# Patient Record
Sex: FEMALE | Race: BLACK OR AFRICAN AMERICAN | Employment: FULL TIME | URBAN - METROPOLITAN AREA
[De-identification: names, ages, dates, MRNs, and addresses within clinical notes are randomized per-mention and may not be internally consistent; named-entity substitution may affect disease eponyms.]

---

## 2017-01-22 ENCOUNTER — HOSPITAL ENCOUNTER (EMERGENCY)
Age: 27
Discharge: HOME | End: 2017-01-22
Attending: EMERGENCY MEDICINE
Payer: MEDICAID

## 2017-01-22 VITALS
WEIGHT: 203 LBS | DIASTOLIC BLOOD PRESSURE: 78 MMHG | RESPIRATION RATE: 16 BRPM | HEIGHT: 69 IN | HEART RATE: 68 BPM | BODY MASS INDEX: 30.07 KG/M2 | SYSTOLIC BLOOD PRESSURE: 134 MMHG | TEMPERATURE: 98.1 F | OXYGEN SATURATION: 100 %

## 2017-01-22 DIAGNOSIS — Z32.01 PREGNANCY TEST PERFORMED, PREGNANCY CONFIRMED: Primary | ICD-10-CM

## 2017-01-22 LAB
APPEARANCE UR: CLEAR
BILIRUB UR QL: NEGATIVE
COLOR UR: YELLOW
GLUCOSE UR STRIP.AUTO-MCNC: NEGATIVE MG/DL
HCG UR QL: POSITIVE
HGB UR QL STRIP: NEGATIVE
KETONES UR QL STRIP.AUTO: NEGATIVE MG/DL
LEUKOCYTE ESTERASE UR QL STRIP.AUTO: NEGATIVE
NITRITE UR QL STRIP.AUTO: NEGATIVE
PH UR STRIP: 5.5 [PH] (ref 5–8)
PROT UR STRIP-MCNC: NEGATIVE MG/DL
SP GR UR REFRACTOMETRY: 1.03 (ref 1–1.03)
UROBILINOGEN UR QL STRIP.AUTO: 1 EU/DL (ref 0.2–1)

## 2017-01-22 PROCEDURE — 81003 URINALYSIS AUTO W/O SCOPE: CPT | Performed by: EMERGENCY MEDICINE

## 2017-01-22 PROCEDURE — 99283 EMERGENCY DEPT VISIT LOW MDM: CPT

## 2017-01-22 PROCEDURE — 81025 URINE PREGNANCY TEST: CPT | Performed by: EMERGENCY MEDICINE

## 2017-01-22 NOTE — ED TRIAGE NOTES
C/o of upper abd pain since last Saturday on and off, + vomiting last Friday. Denies any vag discharge, or UTI symptoms.  + bilaterally flank area pain

## 2017-01-22 NOTE — ED NOTES
I have reviewed discharge instructions with the patient. The patient verbalized understanding. Current Discharge Medication List      START taking these medications    Details   prenatal multivit-ca-min-fe-fa (PRENATAL VITAMIN) tab Take 1 Tab by mouth daily. Qty: 30 Tab, Refills: 0         CONTINUE these medications which have NOT CHANGED    Details   buPROPion SR (WELLBUTRIN SR) 150 mg SR tablet Take 1 Tab by mouth two (2) times a day. Indications: ANXIETY WITH DEPRESSION  Qty: 60 Tab, Refills: 3      calcium citrate-vitamin D3 (CITRACAL WITH VITAMIN D MAXIMUM) tablet Take 2 tablets by mouth three (3) times daily. Associated Diagnoses: Malabsorption      cyanocobalamin (VITAMIN B12) 500 mcg tablet Take 500 mcg by mouth daily. Associated Diagnoses: Malabsorption      albuterol (PROAIR HFA) 90 mcg/actuation inhaler Take 2 Puffs by inhalation as needed. multivitamin with iron (FLINTSTONES) chewable tablet Take 1 tablet by mouth two (2) times a day. Associated Diagnoses: Malabsorption      Cholecalciferol, Vitamin D3, (VITAMIN D3) 2,000 unit cap capsule Take  by mouth two (2) times a day.     Associated Diagnoses: Malabsorption         Patient armband removed and shredded

## 2017-01-22 NOTE — DISCHARGE INSTRUCTIONS
MEDNAX Activation    Thank you for requesting access to MEDNAX. Please follow the instructions below to securely access and download your online medical record. MEDNAX allows you to send messages to your doctor, view your test results, renew your prescriptions, schedule appointments, and more. How Do I Sign Up? 1. In your internet browser, go to [email or web address]  2. Click on the First Time User? Click Here link in the Sign In box. You will be redirect to the New Member Sign Up page. 3. Enter your MEDNAX Access Code exactly as it appears below. You will not need to use this code after youve completed the sign-up process. If you do not sign up before the expiration date, you must request a new code. MEDNAX Access Code: [email or web address]  Expires: 2017 12:41 PM (This is the date your MEDNAX access code will )    4. Enter the last four digits of your Social Security Number (xxxx) and Date of Birth (mm/dd/yyyy) as indicated and click Submit. You will be taken to the next sign-up page. 5. Create a MEDNAX ID. This will be your MEDNAX login ID and cannot be changed, so think of one that is secure and easy to remember. 6. Create a MEDNAX password. You can change your password at any time. 7. Enter your Password Reset Question and Answer. This can be used at a later time if you forget your password. 8. Enter your e-mail address. You will receive e-mail notification when new information is available in [address] 9. Click Sign Up. You can now view and download portions of your medical record. 10. Click the Download Summary menu link to download a portable copy of your medical information. Additional Information    If you have questions, please visit the Frequently Asked Questions section of the MEDNAX website at [email or web address] Remember, MEDNAX is NOT to be used for urgent needs. For medical emergencies, dial 911.

## 2017-01-22 NOTE — ED PROVIDER NOTES
HPI Comments: 10:28 AM [address] Vázquez is a 27 y.o. female with a history of Asthma and Sleep Apnea who presents to ED c/o upper abdominal pain onset a week ago. Pt explains that she has been having abdominal pain unrelated to food for the past week mostly aggravated when lifting heavy things at work which prompted her to come to the ED for further evaluation. Pt also c/o pelvic pain, intermittent lower back pain and SOB. The pt reports her last alcoholic drink being 2-3 days ago, having 2 periods last month, and an episode of vomiting last week. Pt denies having vomiting today, recreational drug use, smoking, and diarrhea. No other concerns at this time. PCP: Shira Caal MD        Patient is a 27 y.o. female presenting with abdominal pain and back pain. The history is provided by the patient. Abdominal Pain    Associated symptoms include back pain (intermittent, lower back). Pertinent negatives include no diarrhea and no vomiting. Back Pain    Associated symptoms include abdominal pain (upper). Pertinent negatives include no pelvic pain. Past Medical History:   Diagnosis Date    Arrhythmia      palpitations    Asthma     Morbid obesity with BMI of 50.0-59.9, adult (HCC)     Sleep apnea     Unspecified sleep apnea        Past Surgical History:   Procedure Laterality Date    Delivery        x 3    Pr abdomen surgery proc unlisted  2014     gastric bypass with repair hiatal hernia    Hx gi       gastric bypass    Hx gyn       c section x 3    Hx orthopaedic       R ring finger fx repair         History reviewed. No pertinent family history. Social History     Social History    Marital status: SINGLE     Spouse name: N/A    Number of children: N/A    Years of education: N/A     Occupational History    Not on file.      Social History Main Topics    Smoking status: Former Smoker     Quit date: 2014    Smokeless tobacco: Never Used    Alcohol use 1.0 oz/week     2 Standard drinks or equivalent per week      Comment: 3x week    Drug use: No    Sexual activity: Yes     Birth control/ protection: None     Other Topics Concern    Not on file     Social History Narrative         ALLERGIES: Review of patient's allergies indicates no known allergies. Review of Systems   Respiratory: Positive for shortness of breath. Gastrointestinal: Positive for abdominal pain (upper). Negative for diarrhea and vomiting. Genitourinary: Negative for pelvic pain. Musculoskeletal: Positive for back pain (intermittent, lower back). All other systems reviewed and are negative. Vitals:    01/22/17 1022   BP: 134/78   Pulse: 68   Resp: 16   Temp: 98.1 °F (36.7 °C)   SpO2: 100%   Weight: 92.1 kg (203 lb)   Height: 5' 9\" (1.753 m)        100% on RA, indicating adequate oxygenation. Physical Exam   Constitutional: She appears well-developed and well-nourished. Non-toxic appearance. She does not have a sickly appearance. She does not appear ill. No distress. HENT:   Head: Normocephalic and atraumatic. Mouth/Throat: Oropharynx is clear and moist. No oropharyngeal exudate. Eyes: Conjunctivae and EOM are normal. Pupils are equal, round, and reactive to light. No scleral icterus. Neck: Normal range of motion. Neck supple. No hepatojugular reflux and no JVD present. No tracheal deviation present. No thyromegaly present. Cardiovascular: Normal rate, regular rhythm, S1 normal, S2 normal, normal heart sounds, intact distal pulses and normal pulses. Exam reveals no gallop, no S3 and no S4. No murmur heard. Pulses:       Radial pulses are 2+ on the right side, and 2+ on the left side. Dorsalis pedis pulses are 2+ on the right side, and 2+ on the left side. Pulmonary/Chest: Effort normal and breath sounds normal. No respiratory distress. She has no decreased breath sounds. She has no wheezes. She has no rhonchi. She has no rales. Abdominal: Soft.  Normal appearance and bowel sounds are normal. She exhibits no shifting dullness, no distension, no pulsatile liver, no fluid wave, no abdominal bruit, no ascites, no pulsatile midline mass and no mass. There is no hepatosplenomegaly. There is generalized tenderness. There is no rigidity, no rebound, no guarding, no CVA tenderness, no tenderness at McBurney's point and negative Andrews's sign. Musculoskeletal: Normal range of motion. Strength 5/5 throughout    Lymphadenopathy:        Head (right side): No submental, no submandibular, no preauricular and no occipital adenopathy present. Head (left side): No submental, no submandibular, no preauricular and no occipital adenopathy present. She has no cervical adenopathy. Right: No supraclavicular adenopathy present. Left: No supraclavicular adenopathy present. Neurological: She is alert. She has normal strength and normal reflexes. She is not disoriented. No cranial nerve deficit or sensory deficit. Coordination and gait normal. GCS eye subscore is 4. GCS verbal subscore is 5. GCS motor subscore is 6. Grossly intact    Skin: Skin is warm, dry and intact. No rash noted. She is not diaphoretic. Psychiatric: She has a normal mood and affect. Her speech is normal and behavior is normal. Judgment and thought content normal. Cognition and memory are normal.   Nursing note and vitals reviewed. MDM  Number of Diagnoses or Management Options  Pregnancy test performed, pregnancy confirmed:   Diagnosis management comments: DDX: Gastritis, gerd, peptic ulcer disease, cholecystitis, pancreatitis, gastroenteritis, hepatitis, constipation related pain, appendicitis pain, diverticulitis, urinary tract infection, obstruction, abdominal wall pain, atypical cardiac (ami or anginal pain), referred pain from pulmonary process (pneumonia, empyema), ectopic pregnancy,  or combination of the above versus many other processes.       ED Course Procedures    Labs essentially normal, HCG-positve. 11:37 AM 1/22/2017    I have reassessed the patient. Patient is feeling okay. Patient will be prescribed Prenatal Vitamins. Patient was discharged in stable condition. Patient is to return to emergency department if any new or worsening condition. Scribe Attestation:   [employer] as a scribe for and in the presence of Ana Euceda DO January 22, 2017 at 10:35 AM       Provider Attestation:   I personally performed the services described in the documentation, reviewed the documentation, as recorded by the scribe in my presence, and it accurately and completely records my words and actions.      Reviewed and signed by:  Ana Euceda DO

## 2017-01-24 ENCOUNTER — OFFICE VISIT (OUTPATIENT)
Dept: SURGERY | Age: 27
End: 2017-01-24

## 2017-01-24 VITALS
HEIGHT: 69 IN | DIASTOLIC BLOOD PRESSURE: 80 MMHG | TEMPERATURE: 98.2 F | BODY MASS INDEX: 30.96 KG/M2 | SYSTOLIC BLOOD PRESSURE: 118 MMHG | WEIGHT: 209 LBS | RESPIRATION RATE: 16 BRPM | HEART RATE: 60 BPM

## 2017-01-24 DIAGNOSIS — E66.9 OBESITY (BMI 30-39.9): ICD-10-CM

## 2017-01-24 DIAGNOSIS — E55.9 HYPOVITAMINOSIS D: ICD-10-CM

## 2017-01-24 DIAGNOSIS — Z3A.01 LESS THAN 8 WEEKS GESTATION OF PREGNANCY: ICD-10-CM

## 2017-01-24 DIAGNOSIS — D50.9 IRON DEFICIENCY ANEMIA, UNSPECIFIED IRON DEFICIENCY ANEMIA TYPE: ICD-10-CM

## 2017-01-24 DIAGNOSIS — K91.2 POSTOPERATIVE MALABSORPTION: Primary | ICD-10-CM

## 2017-01-24 DIAGNOSIS — E53.8 B12 DEFICIENCY: ICD-10-CM

## 2017-01-24 DIAGNOSIS — Z98.84 GASTRIC BYPASS STATUS FOR OBESITY: ICD-10-CM

## 2017-01-24 NOTE — PROGRESS NOTES
Patient is a 27year old female presenting for a 2.5 years s/p gastric bypass. Patient reports she discontinued the Wellbutrin due to finding out she is pregnant. Patient reports she went to ED and confirmed pregnancy. Patient continues vitamins and started prenatal vitamins. Patient has gained 5lbs since last visit 12/2016. Body mass index is 30.86 kg/(m^2).

## 2017-01-24 NOTE — PATIENT INSTRUCTIONS
If you have any question or concerns about today's appointment, the verbal and/or written instructions you were given for follow up care, please call our office at [phone].      [facility], [address]  [place], [address]

## 2017-01-24 NOTE — MR AVS SNAPSHOT
Visit Information Date & Time Provider Department Dept. Phone Encounter #  
 1/24/2017 10:00 AM Judy Leija PA-C [address] [id number] Your Appointments 3/24/2017 10:30 AM  
Follow Up with KAYA Hall [address] ([facility]) Appt Note: GBP f/up [address] [place] [id number] [address] [facility] Upcoming Health Maintenance Date Due Pneumococcal 19-64 Medium Risk (1 of 1 - PPSV23) 2/22/2007 DTaP/Tdap/Td series (1 - Tdap) 2/22/2009 PAP AKA CERVICAL CYTOLOGY 2/22/2009 INFLUENZA AGE 9 TO ADULT 8/1/2016 Allergies as of 1/24/2017  Review Complete On: 1/22/2017 By: Dustin Desir RN No Known Allergies Current Immunizations  Never Reviewed No immunizations on file. Not reviewed this visit Vitals BP Pulse Temp Resp Height(growth percentile) Weight(growth percentile) 118/80 60 98.2 °F (36.8 °C) (Oral) 16 5' 9\" (1.753 m) 209 lb (94.8 kg) LMP BMI OB Status Smoking Status 12/13/2016 30.86 kg/m2 Having regular periods Former Smoker Vitals History BMI and BSA Data Body Mass Index Body Surface Area  
 30.86 kg/m 2 2.15 m 2 Your Updated Medication List  
  
   
This list is accurate as of: 1/24/17 11:06 AM.  Always use your most recent med list.  
  
  
  
  
 buPROPion  mg SR tablet Commonly known as:  Rico Ponds Take 1 Tab by mouth two (2) times a day. Indications: ANXIETY WITH DEPRESSION  
  
 calcium citrate-vitamin D3 tablet Commonly known as:  CITRACAL WITH VITAMIN D MAXIMUM Take 2 tablets by mouth three (3) times daily. Cholecalciferol (Vitamin D3) 2,000 unit Cap capsule Commonly known as:  VITAMIN D3 Take  by mouth two (2) times a day. cyanocobalamin 500 mcg tablet Commonly known as:  VITAMIN B12 Take 500 mcg by mouth daily. multivitamin with iron chewable tablet Commonly known as:  Karyn Fail Take 1 tablet by mouth two (2) times a day. prenatal multivit-ca-min-fe-fa Tab Commonly known as:  PRENATAL VITAMIN Take 1 Tab by mouth daily. PROAIR HFA 90 mcg/actuation inhaler Generic drug:  albuterol Take 2 Puffs by inhalation as needed. To-Do List   
 02/03/2017 9:00 AM  
  Appointment with [facility]IAN at [facility]  ([phone]) Patient Instructions If you have any question or concerns about today's appointment, the verbal and/or written instructions you were given for follow up care, please call our office at [phone]. [address] [place] [address], [address] [place], [address] Introducing [facility]! Alondra Lisa introduces [employer] patient portal. Now you can access parts of your medical record, email your doctor's office, and request medication refills online. 1. In your internet browser, go to [email or web address] 2. Click on the First Time User? Click Here link in the Sign In box. You will see the New Member Sign Up page. 3. Enter your [employer] Access Code exactly as it appears below. You will not need to use this code after youve completed the sign-up process. If you do not sign up before the expiration date, you must request a new code. · [employer] Access Code: [email or web address] Expires: 2/27/2017 12:41 PM 
 
4. Enter the last four digits of your Social Security Number (xxxx) and Date of Birth (mm/dd/yyyy) as indicated and click Submit. You will be taken to the next sign-up page. 5. Create a [employer] ID. This will be your [employer] login ID and cannot be changed, so think of one that is secure and easy to remember. 6. Create a [employer] password. You can change your password at any time. 7. Enter your Password Reset Question and Answer. This can be used at a later time if you forget your password. 8. Enter your e-mail address. You will receive e-mail notification when new information is available in [address] 9. Click Sign Up. You can now view and download portions of your medical record. 10. Click the Download Summary menu link to download a portable copy of your medical information. If you have questions, please visit the Frequently Asked Questions section of the [employer] website. Remember, [employer] is NOT to be used for urgent needs. For medical emergencies, dial 911. Now available from your iPhone and Android! Please provide this summary of care documentation to your next provider. Your primary care clinician is listed as Eve Garrido. If you have any questions after today's visit, please call [phone].

## 2017-01-24 NOTE — PROGRESS NOTES
Pt here for monthly f/u after staring Wellbutrin 150mg bid last month for weight regain after GBP and depression/anxiety. She had been off track with diet and eating a lot of CHO as well as drinking EtOH. She took the Wellbutrin for only 2-3 days when she developed nausea and abd pain and went to ED--she was dx with +pregnancy test. Today she reports that once she found out she was pregnant, she immediately stopped the Wellbutrin and stopped drinking EtOH. She plans to continue the pregnancy. She was treated recently for severe Fe def anemia with IV fe infusions. Since our last appt last month she has been compliant with all supplements    Past Medical History   Diagnosis Date    Arrhythmia      palpitations    Asthma     Morbid obesity with BMI of 50.0-59.9, adult ([facility] [address]     Sleep apnea     Unspecified sleep apnea      Current Outpatient Prescriptions on File Prior to Visit   Medication Sig Dispense Refill    prenatal multivit-ca-min-fe-fa (PRENATAL VITAMIN) tab Take 1 Tab by mouth daily. 30 Tab 0    multivitamin with iron (FLINTSTONES) chewable tablet Take 1 tablet by mouth two (2) times a day.  Cholecalciferol, Vitamin D3, (VITAMIN D3) 2,000 unit cap capsule Take  by mouth two (2) times a day.  calcium citrate-vitamin D3 (CITRACAL WITH VITAMIN D MAXIMUM) tablet Take 2 tablets by mouth three (3) times daily.  cyanocobalamin (VITAMIN B12) 500 mcg tablet Take 500 mcg by mouth daily.  albuterol (PROAIR HFA) 90 mcg/actuation inhaler Take 2 Puffs by inhalation as needed. No current facility-administered medications on file prior to visit.       Visit Vitals    /80    Pulse 60    Temp 98.2 °F (36.8 °C) (Oral)    Resp 16    Ht 5' 9\" (1.753 m)    Wt 94.8 kg (209 lb)    BMI 30.86 kg/m2     Weight Metrics 1/24/2017 1/22/2017 12/29/2016 10/31/2016 9/6/2016 8/22/2016 8/14/2016   Weight 209 lb 203 lb 204 lb 190 lb 180 lb 185 lb 185 lb   BMI 30.86 kg/m2 29.98 kg/m2 30.13 kg/m2 28.06 kg/m2 27.37 kg/m2 28.13 kg/m2 28.13 kg/m2   appears well    Labs: 12/29/16: ferritin 18, Fe 40, D 15, B12 258, Hgb 10.1, all others wnl    A/P: 2.5 years s/p GBP now in early pregnancy. H/o severe fe def anemia--rec pt contact heme to alert of pregnancy status in order to prevent worsening anemia. Rec visit with RD asap to discuss diet during pregnancy--pt should aim for 10-20lb weight loss total during pregnancy and plan to breastfeed to assist with post-partum weight loss. Hypovitaminosis D, B12 def-pt urged to cont all supplements she recently started taking  Counseling>50% of 30 minute visit  F/u 2 months, sooner prn  Javier Henderson PA-C    4/26/17: In reviewing past notes for today's visit, I discovered typing error in plan. Statement \"pt should aim for 10-20lb weight loss total during pregnancy and plan to breastfeed to assist with post-partum weight loss. \" should read, pt should aim for 10-20lb weight gain total during pregnancy and plan to breastfeed to assist with post-partum weight loss.   Javier Henderson PA-C

## 2017-02-08 ENCOUNTER — DOCUMENTATION ONLY (OUTPATIENT)
Dept: BARIATRICS/WEIGHT MGMT | Age: 27
End: 2017-02-08

## 2017-02-08 ENCOUNTER — HOSPITAL ENCOUNTER (OUTPATIENT)
Dept: BARIATRICS/WEIGHT MGMT | Age: 27
Discharge: HOME | End: 2017-02-08

## 2017-02-08 NOTE — PROGRESS NOTES
[facility]  Pregnancy Post-op Nutrition Protocol    Patient's Name: Nydia Vázquez  YOB: 1990  Surgery Date: 14    Procedure: Gastric Bypass    Height: 5  f 9   Pre-Op Weight: 302   Current Weight: 207  Weight Lost: 95  Goal Weight:    % EBW Lost:   BMI:    30.6   Past Weights: : 208      JEYSON: 9/15/17      Problem Areas:   Nausea: Yes   Vomiting: Yes, increase since she's been pregnant. Dumping Syndrome: Yes, she experienced it with milk. Inadequate Protein: Yes  Inadequate Fluids: States she is struggling with this. Food Intolerance:   Hunger: Yes  Constipation:  Here and there. Not taking anything for it. Eating 3 Meals/Day: Yes  Portion Size at Meals:  Hand zie   Protein from Food:     Foods being consumed:  Breakfast: Time:8:00-9:00 am:  Sausage wrap or eggs and some applesauce. She states she has been trying cereal.  States when she ate the cereal, she got rapid heart rate, stomach pain, and sweating. Lunch: Time: 2 pm:   Started back on sandwiches: turkey meat, cheese on bread with strawberries. Dinner:  Time: Varies:  4 pm or 12 am (works night shift)   Baked BBQ chicken, some pasta, some salad, quick sandwich, a lot of fruit, some yogurt. In-between eating: Cheese sticks, potato chips, carrots, apples     Length of time for meals:  30  food/fluids: No    Fluids: 64 oz/day Types of Fluids: Water    MVI: Flintstones  Number/Day: bid Taken Separately: Yes    Calcium: Not taking this  Calcium Dosing:   Taken Separately:     Vitamin B12: sublingual Vitamin B12 Dosin mcg per day    Vitamin D:    Vitamin D dosin IU per day    Iron: 65 mg per day    Protein Supplement: n/a   Grams of Protein: 30       Comments:      Patient is 2 months pregnant. She states at one point, she was down to 180, but has been at this weight for a while.   She states that for about a year and a half, she was very depressed and was drinking a large amount of alcohol, 3-4 drinks a night. She states when she found out she was pregnant, she has stopped that completely. Her diet is high in carbohydrates, focusing on sandwiches, wraps, fruit. I have discussed with patient that her weight gain should be kept around 15-20 pounds during her pregnancy. I talked to her about reactive hypoglycemia and recommended her to stick to meat and vegetables. Limit her fruit intake to 1-2 small servings per day. We talked about protein-rich snacks that she could use in between meals. Patient was instructed to stop her carbohydrate intake. She was re-educated on the vitamin dosing. Patient was also encouraged to get 30 minutes of activity in per day. Patient will follow up with Judy Xiong PA-C in 6 weeks.     Pregnancy Vitamin Protocol Reviewed: x       Diet Follow Up:     Ally Weiss [address] RD    2/8/2017

## 2017-04-26 ENCOUNTER — OFFICE VISIT (OUTPATIENT)
Dept: SURGERY | Age: 27
End: 2017-04-26

## 2017-04-26 VITALS
HEART RATE: 80 BPM | SYSTOLIC BLOOD PRESSURE: 108 MMHG | TEMPERATURE: 97.9 F | DIASTOLIC BLOOD PRESSURE: 70 MMHG | WEIGHT: 217.2 LBS | RESPIRATION RATE: 18 BRPM | HEIGHT: 69 IN | BODY MASS INDEX: 32.17 KG/M2

## 2017-04-26 DIAGNOSIS — F32.A ANXIETY AND DEPRESSION: ICD-10-CM

## 2017-04-26 DIAGNOSIS — E66.9 OBESITY (BMI 30-39.9): ICD-10-CM

## 2017-04-26 DIAGNOSIS — K91.2 POSTOPERATIVE MALABSORPTION: Primary | ICD-10-CM

## 2017-04-26 DIAGNOSIS — Z98.84 GASTRIC BYPASS STATUS FOR OBESITY: ICD-10-CM

## 2017-04-26 DIAGNOSIS — F41.9 ANXIETY AND DEPRESSION: ICD-10-CM

## 2017-04-26 DIAGNOSIS — Z3A.20 20 WEEKS GESTATION OF PREGNANCY: ICD-10-CM

## 2017-04-26 NOTE — MR AVS SNAPSHOT
Visit Information Date & Time Provider Department Dept. Phone Encounter #  
 4/26/2017  1:30 PM Judy Vargas PA-C [address] [id number] Your Appointments 7/27/2017  1:00 PM  
Follow Up with KAYA Batista [facility] ([address]) Appt Note: Bariatric follow up 3 month [address] [place] [address] [facility] Upcoming Health Maintenance Date Due Pneumococcal 19-64 Medium Risk (1 of 1 - PPSV23) 2/22/2007 DTaP/Tdap/Td series (1 - Tdap) 2/22/2009 PAP AKA CERVICAL CYTOLOGY 2/22/2009 INFLUENZA AGE 9 TO ADULT 8/1/2016 Allergies as of 4/26/2017  Review Complete On: 4/26/2017 By: Estephania Meza LPN No Known Allergies Current Immunizations  Never Reviewed No immunizations on file. Not reviewed this visit Vitals BP Pulse Temp Resp Height(growth percentile) Weight(growth percentile) 108/70 80 97.9 °F (36.6 °C) (Oral) 18 5' 9\" (1.753 m) 217 lb 3.2 oz (98.5 kg) BMI OB Status Smoking Status 32.07 kg/m2 Having regular periods Former Smoker BMI and BSA Data Body Mass Index Body Surface Area 32.07 kg/m 2 2.19 m 2 Your Updated Medication List  
  
   
This list is accurate as of: 4/26/17  2:31 PM.  Always use your most recent med list.  
  
  
  
  
 calcium citrate-vitamin D3 tablet Commonly known as:  CITRACAL WITH VITAMIN D MAXIMUM Take 2 tablets by mouth three (3) times daily. Cholecalciferol (Vitamin D3) 2,000 unit Cap capsule Commonly known as:  VITAMIN D3 Take  by mouth two (2) times a day. cyanocobalamin 500 mcg tablet Commonly known as:  VITAMIN B12 Take 500 mcg by mouth daily. multivitamin with iron chewable tablet Commonly known as:  Rubia Vega  
 Take 1 tablet by mouth two (2) times a day. prenatal multivit-ca-min-fe-fa Tab Commonly known as:  PRENATAL VITAMIN Take 1 Tab by mouth daily. PROAIR HFA 90 mcg/actuation inhaler Generic drug:  albuterol Take 2 Puffs by inhalation as needed. Introducing [facility] & HEALTH SERVICES! Susan Navarro introduces [employer] patient portal. Now you can access parts of your medical record, email your doctor's office, and request medication refills online. 1. In your internet browser, go to [email or web address] 2. Click on the First Time User? Click Here link in the Sign In box. You will see the New Member Sign Up page. 3. Enter your [employer] Access Code exactly as it appears below. You will not need to use this code after youve completed the sign-up process. If you do not sign up before the expiration date, you must request a new code. · [employer] Access Code: [email or web address] Expires: 7/25/2017  1:36 PM 
 
4. Enter the last four digits of your Social Security Number (xxxx) and Date of Birth (mm/dd/yyyy) as indicated and click Submit. You will be taken to the next sign-up page. 5. Create a [employer] ID. This will be your [employer] login ID and cannot be changed, so think of one that is secure and easy to remember. 6. Create a [employer] password. You can change your password at any time. 7. Enter your Password Reset Question and Answer. This can be used at a later time if you forget your password. 8. Enter your e-mail address. You will receive e-mail notification when new information is available in [address] 9. Click Sign Up. You can now view and download portions of your medical record. 10. Click the Download Summary menu link to download a portable copy of your medical information. If you have questions, please visit the Frequently Asked Questions section of the [employer] website. Remember, [employer] is NOT to be used for urgent needs. For medical emergencies, dial 911. Now available from your iPhone and Android! Please provide this summary of care documentation to your next provider. Your primary care clinician is listed as Kyle Alba. If you have any questions after today's visit, please call [phone].

## 2017-04-27 ENCOUNTER — HOSPITAL ENCOUNTER (EMERGENCY)
Age: 27
Discharge: HOME | End: 2017-04-27
Attending: EMERGENCY MEDICINE
Payer: MEDICAID

## 2017-04-27 VITALS
RESPIRATION RATE: 18 BRPM | WEIGHT: 212 LBS | HEART RATE: 80 BPM | BODY MASS INDEX: 31.4 KG/M2 | HEIGHT: 69 IN | OXYGEN SATURATION: 99 % | SYSTOLIC BLOOD PRESSURE: 117 MMHG | DIASTOLIC BLOOD PRESSURE: 59 MMHG

## 2017-04-27 DIAGNOSIS — T81.30XA WOUND DEHISCENCE: Primary | ICD-10-CM

## 2017-04-27 DIAGNOSIS — Z3A.20 20 WEEKS GESTATION OF PREGNANCY: ICD-10-CM

## 2017-04-27 DIAGNOSIS — K91.2 POSTOPERATIVE MALABSORPTION: ICD-10-CM

## 2017-04-27 PROCEDURE — 99283 EMERGENCY DEPT VISIT LOW MDM: CPT

## 2017-04-27 NOTE — ED PROVIDER NOTES
HPI Comments: 9:57 AM [address] Vázquez is a 20 wk pregnant, 32 y.o. female with a history of asthma, morbid obesity, and sleep apnea who presents to ED c/o draining wound to right 4th finger pain. Pt explains she broke the finger in  and had surgery on finger in  (Dr. Luisana Del Castillo). Pt states after surgery she developed a cyst which she recently had removed by Dr. Luisana Del Castillo. Pt had stiches taken out yesterday but upon awakening states \"There was pus everywhere and I noticed I can see in my finger. \" Pt has appointment with Dr. Luisana Del Castillo at 3:30PM today. . No other concerns at this time. PCP: Aron Mcknight MD             Past Medical History:   Diagnosis Date    Arrhythmia     palpitations    Asthma     Morbid obesity with BMI of 50.0-59.9, adult ([facility] [address]     Sleep apnea     Unspecified sleep apnea        Past Surgical History:   Procedure Laterality Date    ABDOMEN SURGERY PROC UNLISTED  2014    gastric bypass with repair hiatal hernia    DELIVERY       x 3    HX GI      gastric bypass    HX GYN      c section x 3    HX ORTHOPAEDIC      R ring finger fx repair         No family history on file. Social History     Social History    Marital status: SINGLE     Spouse name: N/A    Number of children: N/A    Years of education: N/A     Occupational History    Not on file. Social History Main Topics    Smoking status: Former Smoker     Quit date: 2014    Smokeless tobacco: Never Used    Alcohol use 1.0 oz/week     2 Standard drinks or equivalent per week      Comment: 3x week    Drug use: No    Sexual activity: Yes     Birth control/ protection: None     Other Topics Concern    Not on file     Social History Narrative         ALLERGIES: Review of patient's allergies indicates no known allergies. Review of Systems   Constitutional: Negative for chills, fatigue and fever. Gastrointestinal: Negative for abdominal pain, nausea and vomiting.    Genitourinary: Negative for dysuria. Skin: Positive for wound (healing surgical wound on left 4th finger). Negative for rash. Psychiatric/Behavioral: The patient is not nervous/anxious. All other systems reviewed and are negative. There were no vitals filed for this visit. Physical Exam   Constitutional: She is oriented to person, place, and time. She appears well-developed. HENT:   Head: Normocephalic and atraumatic. Mouth/Throat: Oropharynx is clear and moist.   Eyes: Conjunctivae and EOM are normal. Pupils are equal, round, and reactive to light. Neck: Normal range of motion. Neck supple. Cardiovascular: Normal rate and regular rhythm. Pulmonary/Chest: Effort normal and breath sounds normal.   Abdominal: Soft. Musculoskeletal: Normal range of motion. Neurological: She is alert and oriented to person, place, and time. Skin: Skin is warm and dry. No rash noted. No erythema. No pallor. Volar aspect of right 4th finger patal wound dehiscence over the proximal phalange. No cellulitis. No erythema. Psychiatric: She has a normal mood and affect. Nursing note and vitals reviewed. [facility]  ED Course       Procedures      Vitals:  Patient Vitals for the past 12 hrs:   Pulse Resp BP SpO2   04/27/17 0951 80 18 117/59 99 %     99% on RA, indicating adequate oxygenation. Progress notes, Consult notes or additional Procedure notes:       Disposition:  Diagnosis:   1. Wound dehiscence        Disposition: discharged    Follow-up Information     Follow up With Details Jaida Aragon today without fail for follow up. [facility] EMERGENCY DEPT Go in 2 days As needed, If symptoms worsen, or with new symptoms [address] [facility]  [phone]            Patient's Medications   Start Taking    No medications on file   Continue Taking    ALBUTEROL (PROAIR HFA) 90 MCG/ACTUATION INHALER    Take 2 Puffs by inhalation as needed.     CALCIUM CITRATE-VITAMIN D3 (CITRACAL WITH VITAMIN D MAXIMUM) TABLET    Take 2 tablets by mouth three (3) times daily. CHOLECALCIFEROL, VITAMIN D3, (VITAMIN D3) 2,000 UNIT CAP CAPSULE    Take  by mouth two (2) times a day. CYANOCOBALAMIN (VITAMIN B12) 500 MCG TABLET    Take 500 mcg by mouth daily. MULTIVITAMIN WITH IRON (FLINTSTONES) CHEWABLE TABLET    Take 1 tablet by mouth two (2) times a day. PRENATAL MULTIVIT-CA-MIN-FE-FA (PRENATAL VITAMIN) TAB    Take 1 Tab by mouth daily. These Medications have changed    No medications on file   Stop Taking    No medications on file     Scribe Attestation:     I, Katya Raines, scribing for and in the presence of  Marlyn Wright MD April 27, 2017 at 10:10 AM     Physician Attestation:   I personally performed the services described in this documentation, reviewed and edited the documentation which was dictated to the scribe in my presence, and it accurately records my words and actions.  Angie Ureña MD  April 27, 2017     Signed by: Risa Pena, 04/27/17, 10:10 AM

## 2017-04-27 NOTE — DISCHARGE INSTRUCTIONS
Wound Care: After Your Visit  Your Care Instructions  Taking good care of your wound at home will help it heal quickly and reduce your chance of infection. The doctor has checked you carefully, but problems can develop later. If you notice any problems or new symptoms, get medical treatment right away. Follow-up care is a key part of your treatment and safety. Be sure to make and go to all appointments, and call your doctor if you are having problems. It's also a good idea to know your test results and keep a list of the medicines you take. How can you care for yourself at home? · Clean the area with soap and water 2 times a day unless your doctor gives you different instructions. Don't use hydrogen peroxide or alcohol, which can slow healing. ¨ You may cover the wound with a thin layer of antibiotic ointment, such as bacitracin, and a nonstick bandage. ¨ Apply more ointment and replace the bandage as needed. · Take pain medicines exactly as directed. Some pain is normal with a wound, but do not ignore pain that is getting worse instead of better. You could have an infection. ¨ If the doctor gave you a prescription medicine for pain, take it as prescribed. ¨ If you are not taking a prescription pain medicine, ask your doctor if you can take an over-the-counter medicine. · Your doctor may have closed your wound with stitches (sutures), staples, or skin glue. ¨ If you have stitches, your doctor may remove them after several days to 2 weeks. Or you may have stitches that dissolve on their own. ¨ If you have staples, your doctor may remove them after 7 to 10 days. ¨ If your wound was closed with skin glue, the glue will wear off in a few days to 2 weeks. When should you call for help? Call your doctor now or seek immediate medical care if:  · You have signs of infection, such as:  ¨ Increased pain, swelling, warmth, or redness near the wound. ¨ Red streaks leading from the wound.   ¨ Pus draining from the wound. ¨ A fever. · You bleed so much from your incision that you soak one or more bandages over 2 to 4 hours. Watch closely for changes in your health, and be sure to contact your doctor if:  · The wound is not getting better each day. Where can you learn more? Go to Ke[employer] Impressions.be  Enter M973 in the search box to learn more about \"Wound Care: After Your Visit. \"   © 6654-9144 Healthwise, Incorporated. Care instructions adapted under license by Harleen Feliz (which disclaims liability or warranty for this information). This care instruction is for use with your licensed healthcare professional. If you have questions about a medical condition or this instruction, always ask your healthcare professional. [address] any warranty or liability for your use of this information. Content Version: 22.0.258175;  Last Revised: April 23, 2012

## 2017-04-27 NOTE — PROGRESS NOTES
Here for f/u nearly 3 years s/p gastric bypass and now 20 weeks pregnant. Last seen 1/24/17. Pt has been doing well--she is under care of Ob/Gyn and is compliant with supplements. She is eating well and has gained 8lbs total since early pregnancy. She is hydrating well, has light urine and is following meats/veggies/fruits diet. Her last f/u with heme was \"good\". She recently received IV fe. She has been checking BS per OB--some readings in the 140s    Past Medical History:   Diagnosis Date    Arrhythmia     palpitations    Asthma     Morbid obesity with BMI of 50.0-59.9, adult ([facility])     Sleep apnea     Unspecified sleep apnea      Current Outpatient Prescriptions on File Prior to Visit   Medication Sig Dispense Refill    prenatal multivit-ca-min-fe-fa (PRENATAL VITAMIN) tab Take 1 Tab by mouth daily. 30 Tab 0    multivitamin with iron (FLINTSTONES) chewable tablet Take 1 tablet by mouth two (2) times a day.  Cholecalciferol, Vitamin D3, (VITAMIN D3) 2,000 unit cap capsule Take  by mouth two (2) times a day.  calcium citrate-vitamin D3 (CITRACAL WITH VITAMIN D MAXIMUM) tablet Take 2 tablets by mouth three (3) times daily.  cyanocobalamin (VITAMIN B12) 500 mcg tablet Take 500 mcg by mouth daily.  albuterol (PROAIR HFA) 90 mcg/actuation inhaler Take 2 Puffs by inhalation as needed. No current facility-administered medications on file prior to visit.       Weight Metrics 4/27/2017 4/26/2017 1/24/2017 1/22/2017 12/29/2016 10/31/2016 9/6/2016   Weight 212 lb 217 lb 3.2 oz 209 lb 203 lb 204 lb 190 lb 180 lb   BMI 31.31 kg/m2 32.07 kg/m2 30.86 kg/m2 29.98 kg/m2 30.13 kg/m2 28.06 kg/m2 27.37 kg/m2     Visit Vitals    /70    Pulse 80    Temp 97.9 °F (36.6 °C) (Oral)    Resp 18    Ht 5' 9\" (1.753 m)    Wt 98.5 kg (217 lb 3.2 oz)    BMI 32.07 kg/m2     Appears well    A/P: 20 weeks pregnancy s/p gastric bypass 8/14 doing well--rec limit CHO in order to better control BS--meats/veggies/fats only  Cont all supplements  On track with weight gain to achieve 15-20lb max gain and reiterated importance of breastfeeding (for baby and mother)--plans to breastfeed  Counseling >50% of 30 minute visit  F/u 3 months, labs prior, sooner prn  MECHE BatistaC

## 2017-04-27 NOTE — ED NOTES
[address] Vázquez is a 32 y.o. female that was discharged in stable. Pt was accompanied by self. Pt is driving. The patients diagnosis, condition and treatment were explained to  patient and aftercare instructions were given. The patient verbalized understanding. Patient armband removed and shredded.

## 2017-04-27 NOTE — ED TRIAGE NOTES
Patient c/o 2nd finger on the right hand has a hole and pus coming out of it, patient has surgery on it 2 weeks ago, patient has an appointment at 330 pm today. Patient finger was broken in a year ago in Aug.  Patient is 20 weeks pregnant.

## 2017-06-27 DIAGNOSIS — Z3A.20 20 WEEKS GESTATION OF PREGNANCY: ICD-10-CM

## 2017-06-27 DIAGNOSIS — K91.2 POSTOPERATIVE MALABSORPTION: ICD-10-CM

## 2017-07-27 ENCOUNTER — OFFICE VISIT (OUTPATIENT)
Dept: SURGERY | Age: 27
End: 2017-07-27

## 2017-07-27 VITALS
HEART RATE: 80 BPM | RESPIRATION RATE: 18 BRPM | SYSTOLIC BLOOD PRESSURE: 112 MMHG | BODY MASS INDEX: 34.51 KG/M2 | HEIGHT: 69 IN | DIASTOLIC BLOOD PRESSURE: 64 MMHG | WEIGHT: 233 LBS | TEMPERATURE: 97.4 F

## 2017-07-27 DIAGNOSIS — K91.2 POSTSURGICAL MALABSORPTION: ICD-10-CM

## 2017-07-27 DIAGNOSIS — K91.2 POSTOPERATIVE MALABSORPTION: Primary | ICD-10-CM

## 2017-07-27 DIAGNOSIS — Z3A.32 32 WEEKS GESTATION OF PREGNANCY: ICD-10-CM

## 2017-07-27 DIAGNOSIS — Z98.84 GASTRIC BYPASS STATUS FOR OBESITY: ICD-10-CM

## 2017-07-27 NOTE — MR AVS SNAPSHOT
Visit Information Date & Time Provider Department Dept. Phone Encounter #  
 7/27/2017  1:00 PM Judy Dumont PA-C [address] [id number] Your Appointments 10/6/2017  2:00 PM  
Follow Up with [facility]A Batista [facility] Appt Note: Early Oct [facility] [address] [id number] [address] [facility] Upcoming Health Maintenance Date Due Pneumococcal 19-64 Medium Risk (1 of 1 - PPSV23) 2/22/2007 DTaP/Tdap/Td series (1 - Tdap) 2/22/2009 PAP AKA CERVICAL CYTOLOGY 2/22/2009 INFLUENZA AGE 9 TO ADULT 8/1/2017 Allergies as of 7/27/2017  Review Complete On: 7/27/2017 By: Ibeth Chow PA-C No Known Allergies Current Immunizations  Never Reviewed No immunizations on file. Not reviewed this visit Vitals BP Pulse Temp Resp Height(growth percentile) Weight(growth percentile) 112/64 80 97.4 °F (36.3 °C) 18 5' 9\" (1.753 m) 233 lb (105.7 kg) LMP BMI OB Status Smoking Status 12/13/2016 34.41 kg/m2 Pregnant Former Smoker Vitals History BMI and BSA Data Body Mass Index Body Surface Area 34.41 kg/m 2 2.27 m 2 Your Updated Medication List  
  
   
This list is accurate as of: 7/27/17  1:49 PM.  Always use your most recent med list.  
  
  
  
  
 Cholecalciferol (Vitamin D3) 2,000 unit Cap capsule Commonly known as:  VITAMIN D3 Take  by mouth two (2) times a day. cyanocobalamin 500 mcg tablet Commonly known as:  VITAMIN B12 Take 500 mcg by mouth daily. multivitamin with iron chewable tablet Commonly known as:  Rafy Kirsten Take 1 tablet by mouth two (2) times a day. prenatal multivit-ca-min-fe-fa Tab Commonly known as:  PRENATAL VITAMIN Take 1 Tab by mouth daily. PROAIR HFA 90 mcg/actuation inhaler Generic drug:  albuterol Take 2 Puffs by inhalation as needed. Introducing \[facility]\"" & HEALTH SERVICES! Lexi Ken introduces [employer] patient portal. Now you can access parts of your medical record, email your doctor's office, and request medication refills online. 1. In your internet browser, go to [email or web address] 2. Click on the First Time User? Click Here link in the Sign In box. You will see the New Member Sign Up page. 3. Enter your [employer] Access Code exactly as it appears below. You will not need to use this code after youve completed the sign-up process. If you do not sign up before the expiration date, you must request a new code. · [employer] Access Code: [email or web address] Expires: 10/25/2017 12:58 PM 
 
4. Enter the last four digits of your Social Security Number (xxxx) and Date of Birth (mm/dd/yyyy) as indicated and click Submit. You will be taken to the next sign-up page. 5. Create a [employer] ID. This will be your [employer] login ID and cannot be changed, so think of one that is secure and easy to remember. 6. Create a [employer] password. You can change your password at any time. 7. Enter your Password Reset Question and Answer. This can be used at a later time if you forget your password. 8. Enter your e-mail address. You will receive e-mail notification when new information is available in [address] 9. Click Sign Up. You can now view and download portions of your medical record. 10. Click the Download Summary menu link to download a portable copy of your medical information. If you have questions, please visit the Frequently Asked Questions section of the [employer] website. Remember, [employer] is NOT to be used for urgent needs. For medical emergencies, dial 911. Now available from your iPhone and Android! Please provide this summary of care documentation to your next provider. Your primary care clinician is listed as Darron April. If you have any questions after today's visit, please call [phone].

## 2017-07-27 NOTE — PROGRESS NOTES
Ms. Felicity Jay returns to the office for a weight check. She is status post gastric bypass from 2014, and had been working with me regularly for weight regain when she became pregnant. She was last seen by me 2017 and at the time was 20 weeks pregnant. Since that time she has been doing fairly well overall, she is currently up 30 pounds total.  She is due to have a  section planned for 2017. She has been having some difficulty with her obstetric care team regarding her diet and blood sugar. Her team is advising her to eat carbohydrate foods, however she is trying to resist given her previous history of reactive hypoglycemia. Nonetheless, she has been eating oatmeal and fruits as advised by a diabetic educator. She is still planning to breast-feed and desires restarting her weight loss program with me once she delivers. She is otherwise well. Past Medical History:   Diagnosis Date    Arrhythmia     palpitations/pt denies    Asthma     Morbid obesity with BMI of 50.0-59.9, adult ([email or web address]     Sleep apnea     Unspecified sleep apnea      Patient Active Problem List   Diagnosis Code    Snoring R06.83    Morbid obesity with BMI of 50.0-59.9, adult ([facility]) E66.01, Z68.43    Unspecified sleep apnea G47.30    Asthma J45.909    Crushing injury of right ring finger S67.194A    B12 deficiency E53.8    Chronic fatigue R53.82    Iron deficiency anemia D50.9    Postoperative malabsorption K91.2    Gastric bypass status for obesity Z98.84    Anxiety and depression F41.9, F32.9    Hypovitaminosis D E55.9    Less than 8 weeks gestation of pregnancy Z3A.01    Obesity (BMI 30-39. 9) E66.9    20 weeks gestation of pregnancy Z3A.20     Current Outpatient Prescriptions on File Prior to Visit   Medication Sig Dispense Refill    prenatal multivit-ca-min-fe-fa (PRENATAL VITAMIN) tab Take 1 Tab by mouth daily.  30 Tab 0    multivitamin with iron (FLINTSTONES) chewable tablet Take 1 tablet by mouth two (2) times a day.  Cholecalciferol, Vitamin D3, (VITAMIN D3) 2,000 unit cap capsule Take  by mouth two (2) times a day.  cyanocobalamin (VITAMIN B12) 500 mcg tablet Take 500 mcg by mouth daily.  albuterol (PROAIR HFA) 90 mcg/actuation inhaler Take 2 Puffs by inhalation as needed. No current facility-administered medications on file prior to visit. Weight Metrics 7/27/2017 4/27/2017 4/26/2017 1/24/2017 1/22/2017 12/29/2016 10/31/2016   Weight 233 lb 212 lb 217 lb 3.2 oz 209 lb 203 lb 204 lb 190 lb   BMI 34.41 kg/m2 31.31 kg/m2 32.07 kg/m2 30.86 kg/m2 29.98 kg/m2 30.13 kg/m2 28.06 kg/m2     Visit Vitals    /64    Pulse 80    Temp 97.4 °F (36.3 °C)    Resp 18    Ht 5' 9\" (1.753 m)    Wt 105.7 kg (233 lb)    BMI 34.41 kg/m2     Appears well  Pregnant uterus    Labs 7/13/2017: Ferritin 11, vitamin D 34 all others within normal limits    A/P: Patient is currently 3 years status post gastric bypass, and 32 weeks pregnant doing well overall. Reiterated Ms. Case's understanding of her need to follow a low carbohydrate diet to avoid dangerous reactive hypoglycemia after gastric bypass. Sent patient home with preprinted list of acceptable proteins and vegetables and carbohydrate foods to avoid. Offered to have her obstetric team contact me for more detailed diet information, or any information pertaining to her gastric bypass. Encourage patient with her breast-feeding plans. Continue prenatal vitamins per OB team, encouraged that iron studies are improving.   Counseling greater than 50% of 30 minute visit  Follow-up early October to restart weight loss program, sooner as needed  Judy Xiong PA-C

## 2017-09-29 ENCOUNTER — TELEPHONE (OUTPATIENT)
Dept: SURGERY | Age: 27
End: 2017-09-29

## 2017-09-29 NOTE — TELEPHONE ENCOUNTER
Confirmed appt/labs with pt for appt, will do labs tomorrow, 01-82-28 for appt 10-06-17-MSanchezLKEITH

## 2017-10-02 ENCOUNTER — HOSPITAL ENCOUNTER (OUTPATIENT)
Dept: LAB | Age: 27
Discharge: HOME | End: 2017-10-02

## 2017-10-02 PROCEDURE — 99001 SPECIMEN HANDLING PT-LAB: CPT | Performed by: PHYSICIAN ASSISTANT

## 2017-10-05 LAB
ALBUMIN SERPL-MCNC: 4.1 G/DL (ref 3.5–5.5)
ALBUMIN/GLOB SERPL: 1.2 {RATIO} (ref 1.2–2.2)
ALP SERPL-CCNC: 83 IU/L (ref 39–117)
ALT SERPL-CCNC: 17 IU/L (ref 0–32)
AST SERPL-CCNC: 20 IU/L (ref 0–40)
BASOPHILS # BLD AUTO: 0 X10E3/UL (ref 0–0.2)
BASOPHILS NFR BLD AUTO: 1 %
BILIRUB SERPL-MCNC: 0.2 MG/DL (ref 0–1.2)
BUN SERPL-MCNC: 15 MG/DL (ref 6–20)
BUN/CREAT SERPL: 20 (ref 9–23)
CALCIUM SERPL-MCNC: 9.5 MG/DL (ref 8.7–10.2)
CHLORIDE SERPL-SCNC: 102 MMOL/L (ref 96–106)
CO2 SERPL-SCNC: 24 MMOL/L (ref 18–29)
CREAT SERPL-MCNC: 0.75 MG/DL (ref 0.57–1)
EOSINOPHIL # BLD AUTO: 0.2 X10E3/UL (ref 0–0.4)
EOSINOPHIL NFR BLD AUTO: 4 %
ERYTHROCYTE [DISTWIDTH] IN BLOOD BY AUTOMATED COUNT: 15.7 % (ref 12.3–15.4)
FERRITIN SERPL-MCNC: 29 NG/ML (ref 15–150)
FOLATE SERPL-MCNC: 16.4 NG/ML
GLOBULIN SER CALC-MCNC: 3.4 G/DL (ref 1.5–4.5)
GLUCOSE SERPL-MCNC: 81 MG/DL (ref 65–99)
HCT VFR BLD AUTO: 38.2 % (ref 34–46.6)
HGB BLD-MCNC: 12.2 G/DL (ref 11.1–15.9)
IMM GRANULOCYTES # BLD: 0 X10E3/UL (ref 0–0.1)
IMM GRANULOCYTES NFR BLD: 0 %
IRON SERPL-MCNC: 76 UG/DL (ref 27–159)
LYMPHOCYTES # BLD AUTO: 1.9 X10E3/UL (ref 0.7–3.1)
LYMPHOCYTES NFR BLD AUTO: 34 %
MCH RBC QN AUTO: 26.3 PG (ref 26.6–33)
MCHC RBC AUTO-ENTMCNC: 31.9 G/DL (ref 31.5–35.7)
MCV RBC AUTO: 83 FL (ref 79–97)
MONOCYTES # BLD AUTO: 0.5 X10E3/UL (ref 0.1–0.9)
MONOCYTES NFR BLD AUTO: 9 %
NEUTROPHILS # BLD AUTO: 2.9 X10E3/UL (ref 1.4–7)
NEUTROPHILS NFR BLD AUTO: 52 %
PLATELET # BLD AUTO: 253 X10E3/UL (ref 150–379)
POTASSIUM SERPL-SCNC: 4.1 MMOL/L (ref 3.5–5.2)
PROT SERPL-MCNC: 7.5 G/DL (ref 6–8.5)
RBC # BLD AUTO: 4.63 X10E6/UL (ref 3.77–5.28)
SODIUM SERPL-SCNC: 140 MMOL/L (ref 134–144)
VIT B1 BLD-SCNC: 116.4 NMOL/L (ref 66.5–200)
VIT B12 SERPL-MCNC: 753 PG/ML (ref 211–946)
WBC # BLD AUTO: 5.6 X10E3/UL (ref 3.4–10.8)

## 2017-10-06 ENCOUNTER — OFFICE VISIT (OUTPATIENT)
Dept: SURGERY | Age: 27
End: 2017-10-06

## 2017-10-06 VITALS
WEIGHT: 225 LBS | RESPIRATION RATE: 16 BRPM | SYSTOLIC BLOOD PRESSURE: 128 MMHG | HEART RATE: 72 BPM | BODY MASS INDEX: 33.33 KG/M2 | DIASTOLIC BLOOD PRESSURE: 74 MMHG | HEIGHT: 69 IN | TEMPERATURE: 98.2 F

## 2017-10-06 DIAGNOSIS — E66.9 OBESITY (BMI 30-39.9): ICD-10-CM

## 2017-10-06 DIAGNOSIS — K91.2 POSTOPERATIVE MALABSORPTION: Primary | ICD-10-CM

## 2017-10-06 DIAGNOSIS — Z98.84 GASTRIC BYPASS STATUS FOR OBESITY: ICD-10-CM

## 2017-10-09 NOTE — PROGRESS NOTES
Ms. Boyd Jones returns to the office for weight check, gastric bypass follow-up. She was last seen several months ago, and since that time has delivered a healthy male baby at full-term. She is currently 4 weeks postpartum, is breast-feeding without problems, and feels very well. She is actively losing her pregnancy weight, and is compliant with all of her vitamin and mineral supplementation. She would like to continue to lose weight actively but understands that she must hydrate well and eat nutritious foods while she is breast-feeding especially. She has no current complaints. Labs were ordered and performed on 10/2/17    Past Medical History:   Diagnosis Date    Arrhythmia     palpitations/pt denies    Asthma     Morbid obesity with BMI of 50.0-59.9, adult ([facility]     Sleep apnea     Unspecified sleep apnea      Patient Active Problem List   Diagnosis Code    Snoring R06.83    Morbid obesity with BMI of 50.0-59.9, adult ([facility]) E66.01, Z68.43    Unspecified sleep apnea G47.30    Asthma J45.909    Crushing injury of right ring finger S67.194A    B12 deficiency E53.8    Chronic fatigue R53.82    Iron deficiency anemia D50.9    Postoperative malabsorption K91.2    Gastric bypass status for obesity Z98.84    Anxiety and depression F41.8    Hypovitaminosis D E55.9    Less than 8 weeks gestation of pregnancy Z3A.01    Obesity (BMI 30-39. 9) E66.9    20 weeks gestation of pregnancy Z3A.20    32 weeks gestation of pregnancy Z3A.32     Weight Metrics 10/6/2017 7/27/2017 4/27/2017 4/26/2017 1/24/2017 1/22/2017 12/29/2016   Weight 225 lb 233 lb 212 lb 217 lb 3.2 oz 209 lb 203 lb 204 lb   BMI 33.23 kg/m2 34.41 kg/m2 31.31 kg/m2 32.07 kg/m2 30.86 kg/m2 29.98 kg/m2 30.13 kg/m2     Visit Vitals    /74    Pulse 72    Temp 98.2 °F (36.8 °C)    Resp 16    Ht 5' 9\" (1.753 m)    Wt 102.1 kg (225 lb)    Breastfeeding Yes    BMI 33.23 kg/m2     Appears well  Nursing baby boy    Labs: 10/2/17: ferritin 29, all others wnl    A/P: Patient is 3 years status post gastric bypass, and 4 weeks postpartum delivery of healthy baby boy--advised patient to continue to hydrate well and eat meats and veggies diet. Also advised breast-feeding as long as she desires.   She should continue all of her supplements as prescribed  She was advised to restart her exercise program once cleared by her OB  Follow-up 3 months for continued weight evaluation, sooner as needed  Counseling greater than 50% of 15 minute visit  Judy Xiong PA-C

## 2017-10-12 LAB
25(OH)D3+25(OH)D2 SERPL-MCNC: 34.4 NG/ML (ref 30–100)
SPECIMEN STATUS REPORT, ROLRST: NORMAL

## 2018-01-10 ENCOUNTER — OFFICE VISIT (OUTPATIENT)
Dept: SURGERY | Age: 28
End: 2018-01-10

## 2018-01-10 VITALS
HEART RATE: 67 BPM | RESPIRATION RATE: 17 BRPM | WEIGHT: 235 LBS | TEMPERATURE: 98.1 F | HEIGHT: 69 IN | DIASTOLIC BLOOD PRESSURE: 78 MMHG | BODY MASS INDEX: 34.8 KG/M2 | OXYGEN SATURATION: 97 % | SYSTOLIC BLOOD PRESSURE: 132 MMHG

## 2018-01-10 DIAGNOSIS — K91.2 POSTOPERATIVE MALABSORPTION: Primary | ICD-10-CM

## 2018-01-10 DIAGNOSIS — E66.9 OBESITY (BMI 30-39.9): ICD-10-CM

## 2018-01-10 DIAGNOSIS — Z98.84 GASTRIC BYPASS STATUS FOR OBESITY: ICD-10-CM

## 2018-01-10 RX ORDER — SERTRALINE HYDROCHLORIDE 50 MG/1
TABLET, FILM COATED ORAL
Refills: 6 | COMMUNITY
Start: 2017-12-05 | End: 2019-04-01

## 2018-01-10 NOTE — PROGRESS NOTES
Ms. Mahi Mckeon returns to the office for weight check. She was last seen by me 3 months ago, and is now more than 3 years status post gastric bypass, and 4 months postpartum. She is here to review her diet intake, as well as plans for aggressive weight loss efforts once she weans her baby. She notes that she is still continuing to breast-feed, however her baby is nursing less frequently lately. She has noticed a significant increase in her appetite, and has in fact gained 10 pounds in the last 3 months. This is very distressing to her. She acknowledges that she is not a candidate for pharmacotherapy for weight loss while she is breast-feeding. She is compliant with vitamin and mineral supplementation and is otherwise well. Past Medical History:   Diagnosis Date    Arrhythmia     palpitations/pt denies    Asthma     Morbid obesity with BMI of 50.0-59.9, adult ([facility]     Sleep apnea     Unspecified sleep apnea      Patient Active Problem List   Diagnosis Code    Snoring R06.83    Morbid obesity with BMI of 50.0-59.9, adult ([facility]) E66.01, Z68.43    Unspecified sleep apnea G47.30    Asthma J45.909    Crushing injury of right ring finger S67.194A    B12 deficiency E53.8    Chronic fatigue R53.82    Iron deficiency anemia D50.9    Postoperative malabsorption K91.2    Gastric bypass status for obesity Z98.84    Anxiety and depression F41.8    Hypovitaminosis D E55.9    Less than 8 weeks gestation of pregnancy Z3A.01    Obesity (BMI 30-39. 9) E66.9    20 weeks gestation of pregnancy Z3A.20    32 weeks gestation of pregnancy Z3A.32     Current Outpatient Prescriptions on File Prior to Visit   Medication Sig Dispense Refill    prenatal multivit-ca-min-fe-fa (PRENATAL VITAMIN) tab Take 1 Tab by mouth daily. 30 Tab 0    multivitamin with iron (FLINTSTONES) chewable tablet Take 1 tablet by mouth two (2) times a day.       Cholecalciferol, Vitamin D3, (VITAMIN D3) 2,000 unit cap capsule Take  by mouth two (2) times a day.  cyanocobalamin (VITAMIN B12) 500 mcg tablet Take 500 mcg by mouth daily.  albuterol (PROAIR HFA) 90 mcg/actuation inhaler Take 2 Puffs by inhalation as needed. No current facility-administered medications on file prior to visit. Weight Metrics 1/10/2018 10/6/2017 7/27/2017 4/27/2017 4/26/2017 1/24/2017 1/22/2017   Weight 235 lb 225 lb 233 lb 212 lb 217 lb 3.2 oz 209 lb 203 lb   BMI 34.7 kg/m2 33.23 kg/m2 34.41 kg/m2 31.31 kg/m2 32.07 kg/m2 30.86 kg/m2 29.98 kg/m2     Visit Vitals    /78    Pulse 67    Temp 98.1 °F (36.7 °C) (Oral)    Resp 17    Ht 5' 9\" (1.753 m)    Wt 106.6 kg (235 lb)    SpO2 97%    Breastfeeding Yes    BMI 34.7 kg/m2     Appears well    A/P: Patient is 3+ years status post gastric bypass with weight regain, currently 4 months postpartum and still actively breast-feeding-- we reviewed the components of a healthy diet including following a low carbohydrate, moderate fat and protein diet, as well as regular exercise of 150 minutes a week. Patient acknowledges that we are unable to initiate pharmacotherapy while she is actively breast-feeding, but instead I recommended she contact me once she weans the baby for more aggressive weight loss efforts.   Counseling greater than 50% of 15 minute visit  Follow-up as needed  Judy Xiong PA-C

## 2018-07-31 ENCOUNTER — DOCUMENTATION ONLY (OUTPATIENT)
Dept: SURGERY | Age: 28
End: 2018-07-31

## 2018-07-31 NOTE — PROGRESS NOTES
Per [facility] requirements;  E-mail and letter sent for follow up appointment. Manan Dhara [address]      Dear Patient,    Your health is our main concern. It is important for your health to have follow-up lab work and to see you surgeon at 3 months, 6 months and annually after your weight loss surgery. Additionally, the Department of bariatric Surgery at our hospital is a member of the [employer] [address] Surgical Quality Improvement Program ([facility] NSQIP). As a participant in this program, we gather information on the outcomes of our patients after surgery. Please call the office for a follow up appointment at [phone] with CRYSTAL Huitron-BC. If you have moved out of the area or have changed surgeons please call us and let us know the name of your doctor. Your health and feedback are important to us. We greatly appreciate your response.        Thank you,  Manan Martinezgo Loss [facility]

## 2018-07-31 NOTE — LETTER
[address] [place] [address] [phone] [facility] 
[phone]  [facility] Dear Patient, Your health is our main concern. It is important for your health to have follow-up lab work and to see you surgeon at 3 months, 6 months and annually after your weight loss surgery. Additionally, the Department of bariatric Surgery at our hospital is a member of the [employer] [address] Surgical Quality Improvement Program ([facility] NSQIP). As a participant in this program, we gather information on the outcomes of our patients after surgery. Please call the office for a follow up appointment at [phone] with Denver Generous, FNP-BC. If you have moved out of the area or have changed surgeons please call us and let us know the name of your doctor. Your health and feedback are important to us. We greatly appreciate your response. Thank you, [facility]

## 2018-10-29 DIAGNOSIS — K91.2 POSTOPERATIVE MALABSORPTION: Primary | ICD-10-CM

## 2018-11-10 ENCOUNTER — HOSPITAL ENCOUNTER (OUTPATIENT)
Dept: LAB | Age: 28
Discharge: HOME | End: 2018-11-10

## 2018-11-10 LAB — XX-LABCORP SPECIMEN COL,LCBCF: NORMAL

## 2018-11-10 PROCEDURE — 99001 SPECIMEN HANDLING PT-LAB: CPT

## 2018-11-12 ENCOUNTER — OFFICE VISIT (OUTPATIENT)
Dept: SURGERY | Age: 28
End: 2018-11-12

## 2018-11-12 VITALS
BODY MASS INDEX: 39.4 KG/M2 | TEMPERATURE: 98 F | HEART RATE: 67 BPM | HEIGHT: 69 IN | DIASTOLIC BLOOD PRESSURE: 86 MMHG | SYSTOLIC BLOOD PRESSURE: 132 MMHG | WEIGHT: 266 LBS | RESPIRATION RATE: 16 BRPM | OXYGEN SATURATION: 97 %

## 2018-11-12 DIAGNOSIS — E66.01 MORBID OBESITY (HCC): Primary | ICD-10-CM

## 2018-11-12 DIAGNOSIS — K21.9 GASTROESOPHAGEAL REFLUX DISEASE WITHOUT ESOPHAGITIS: ICD-10-CM

## 2018-11-12 DIAGNOSIS — E66.01 MORBID OBESITY (HCC): ICD-10-CM

## 2018-11-12 DIAGNOSIS — K90.9 INTESTINAL MALABSORPTION, UNSPECIFIED TYPE: ICD-10-CM

## 2018-11-12 DIAGNOSIS — K91.2 POST-RESECTION MALABSORPTION: Primary | ICD-10-CM

## 2018-11-12 RX ORDER — MONTELUKAST SODIUM 10 MG/1
10 TABLET ORAL DAILY
COMMUNITY

## 2018-11-12 NOTE — PROGRESS NOTES
Bariatric Follow Up Note    [address] is 4 years 3 mos s/p gastric bypass and a little over a year post partum. Doing well overall. Some episodes of n/v cramping with eating too much or too quickly. Returns for close follow up in effort to get back on track after completing her course of breast feeding. Currently on a bariatric diet without difficulty. Taking in 60-80oz water,  Not counting g protein and admits to eating \"too many carbs. \"  Minimal activity only. The patient denies hair loss. Bowel movements are regular. The patient is not having any pain. . She is not compliant with multivitamins, Protein, calcium, Vit D and B12 supplements. Current labs pending. The patient reports some difficulty eating/drinking as outlined above. The patient denies smoking, etOH use, NSAID use and carbonation ingestion. Weight Loss Metrics 11/12/2018 1/10/2018 10/6/2017 7/27/2017 4/27/2017 4/26/2017 1/24/2017   Pre op / Initial Wt - - - - - - -   Today's Wt 266 lb 235 lb 225 lb 233 lb 212 lb 217 lb 3.2 oz 209 lb   BMI 39.28 kg/m2 34.7 kg/m2 33.23 kg/m2 34.41 kg/m2 31.31 kg/m2 32.07 kg/m2 30.86 kg/m2   Ideal Body Wt - - - - - - -   Excess Body Wt - - - - - - -   Goal Wt - - - - - - -   Wt loss to date - - - - - - -   % Wt Loss - - - - - - -   80% EBW - - - - - - -       Body mass index is 39.28 kg/m².       Comorbidities:     Hypertension: not applicable  Diabetes: not applicable  Obstructive Sleep Apnea: resolved  Hyperlipidemia: not applicable  Stress Urinary Incontinence: not applicable  Gastroesophageal Reflux: not applicable  Weight related arthropathy:not applicable            Past Medical History:   Diagnosis Date    Arrhythmia     palpitations/pt denies    Asthma     Morbid obesity with BMI of 50.0-59.9, adult ([address]     Sleep apnea     Unspecified sleep apnea        Past Surgical History:   Procedure Laterality Date    ABDOMEN SURGERY PROC UNLISTED  8/12/2014    gastric bypass with repair hiatal hernia    DELIVERY       x 3    HX GI      gastric bypass    HX GYN      c section x 4    HX ORTHOPAEDIC  2017    R ring finger fx repair, due to scar tissue       Current Outpatient Medications   Medication Sig Dispense Refill    montelukast (SINGULAIR) 10 mg tablet Take 10 mg by mouth daily.  Cholecalciferol, Vitamin D3, (VITAMIN D3) 2,000 unit cap capsule Take  by mouth two (2) times a day.  cyanocobalamin (VITAMIN B12) 500 mcg tablet Take 500 mcg by mouth daily.  albuterol (PROAIR HFA) 90 mcg/actuation inhaler Take 2 Puffs by inhalation as needed.  sertraline (ZOLOFT) 50 mg tablet   6    multivitamin with iron (FLINTSTONES) chewable tablet Take 1 tablet by mouth two (2) times a day. No Known Allergies    ROS:  Review of Systems:  Positives in bold    Constitutional:Unexpected weight gain/loss, night sweats,fatigue/maliase/lethargy, change in sleep, fever, rash  Eyes:  Visual changes, headaches, eye pain, floaters  ENT:  Rhinorrhea, epistaxis, change in hearing, gingival bleeding, sore throat, dysphagia  CV:  Denies chest pain, shortness of breath, difficulty breathing, orthopnea, palpitations, loss of consciousness, claudication  Resp: Cough, wheeze, haemoptysis, sob, exercise intolerence  GI:  Abdominal pain, dysphagia, reflux, boating, cramping. Obstipation, haematemesis, brbpr, hematochezia,dark tarry stools. Nausea, vomitting, diarrhea, constipation. Neuro: Paresthesias, numbness, weakness, changes in balance, changes in speech, loss of control of bowel or bladder,   Psych: Changes in depression, anxiety, sleep patterns, change in energy Levels    Remainder of ROS as per HPI. Physicial Exam:  Visit Vitals  /86   Pulse 67   Temp 98 °F (36.7 °C) (Oral)   Resp 16   Ht 5' 9\" (1.753 m)   Wt 120.7 kg (266 lb)   SpO2 97%   BMI 39.28 kg/m²         General: AAOX3, pleasant and cooperative to exam. Appropriately groomed. NAD.  Non-toxic in appearance. Appears stated age. HENT: NC/AT. PERRLA. Extraocular motions are intact. Sclera anicteric, Conjunctiva Clear. Nares clear. Oropharynx pink, moist without exudate or erythema. Uvula Midline. Neck:  Supple, trachea is midline. No JVD, Lymphadenopathy. No bruits. Chest: Good equal bilateral expansion  Lungs: Clear to auscultation bilaterally without e/o crackles, wheezes or rhales. Heart: RRR, S1 and S2 noted. No c/r/m/g/vpmi. Abdomen: obese, soft and non-tender without distension. Good bowel sounds. No vis/palp masses or pulsations. No organo-splenomegaly. No hernias to my exam. No e/o acute abdomen or peritoneal signs. Previous surgical wounds well-healed. Pelvis: Stable. :  Deferred  Rectal: Deferred  Extremities: Positive pulses in all 4 extremities. Baseline range of motion in all 4 extremities. Strength, sensation and reflexes intact, appropriate and equal in b/l upper and lower extremities. No C/C/E  Neuro: CN II-XII grossly intact without focal deficit. Ambulatory. Skin: Clean, warm and dry. Labs: Pending. Will review once obtained. Assessment/Plan: Pt is currently 4 years three mos s/p laparoscopic gastric bypass surgery with a total weight loss of 99 lbs to date, doing well overall. In need of \"back on track, \" and close follow up. Stressed importance of hydration with SF clear liquids until urine clear, protein and mvits. OK to continue diet, with food content mainly meats/veggies; preplan and avoid carbs. Encouraged support group attendance, recommended dietician visit food diary. Advised exercise program of 20-30 minutes daily 5-7 times per week. Recommended utilizing bariatric multivitamins or at least adult chewable multivitamins in lieu of flintstones and/or flintstones gummies. Follow up 3 mos, sooner as needed. Will review labs from today at next visit.      Health Maintenance issues reviewed and except as it relates to bariatrics, deferred to primary care.     Greater than 50% of this 30 minute visit was spent couseling the patient about the aforementioned issues.          Yolanda Manning MS, PA-C

## 2018-11-12 NOTE — PROGRESS NOTES
1. Have you been to the ER, urgent care clinic since your last visit? Hospitalized since your last visit? No    2. Have you seen or consulted any other health care providers outside of the [address] [place] [address] since your last visit? Include any pap smears or colon screening.

## 2018-11-19 LAB
25(OH)D2 SERPL-MCNC: <1 NG/ML
25(OH)D3 SERPL-MCNC: 17 NG/ML
25(OH)D3+25(OH)D2 SERPL-MCNC: 18 NG/ML
BASOPHILS # BLD AUTO: 0 X10E3/UL (ref 0–0.2)
BASOPHILS NFR BLD AUTO: 1 %
BUN SERPL-MCNC: 8 MG/DL (ref 6–20)
BUN/CREAT SERPL: 10 (ref 9–23)
CALCIUM SERPL-MCNC: 9.4 MG/DL (ref 8.7–10.2)
CHLORIDE SERPL-SCNC: 101 MMOL/L (ref 96–106)
CO2 SERPL-SCNC: 25 MMOL/L (ref 20–29)
CREAT SERPL-MCNC: 0.77 MG/DL (ref 0.57–1)
EOSINOPHIL # BLD AUTO: 0.2 X10E3/UL (ref 0–0.4)
EOSINOPHIL NFR BLD AUTO: 4 %
ERYTHROCYTE [DISTWIDTH] IN BLOOD BY AUTOMATED COUNT: 17.4 % (ref 12.3–15.4)
FERRITIN SERPL-MCNC: 7 NG/ML (ref 15–150)
FOLATE SERPL-MCNC: 9.5 NG/ML
GLUCOSE SERPL-MCNC: 96 MG/DL (ref 65–99)
HCT VFR BLD AUTO: 31.8 % (ref 34–46.6)
HGB BLD-MCNC: 9.3 G/DL (ref 11.1–15.9)
IMM GRANULOCYTES # BLD: 0 X10E3/UL (ref 0–0.1)
IMM GRANULOCYTES NFR BLD: 0 %
IRON SERPL-MCNC: 23 UG/DL (ref 27–159)
LYMPHOCYTES # BLD AUTO: 1.4 X10E3/UL (ref 0.7–3.1)
LYMPHOCYTES NFR BLD AUTO: 28 %
MCH RBC QN AUTO: 21.9 PG (ref 26.6–33)
MCHC RBC AUTO-ENTMCNC: 29.2 G/DL (ref 31.5–35.7)
MCV RBC AUTO: 75 FL (ref 79–97)
MONOCYTES # BLD AUTO: 0.6 X10E3/UL (ref 0.1–0.9)
MONOCYTES NFR BLD AUTO: 12 %
NEUTROPHILS # BLD AUTO: 2.8 X10E3/UL (ref 1.4–7)
NEUTROPHILS NFR BLD AUTO: 55 %
PLATELET # BLD AUTO: 303 X10E3/UL (ref 150–379)
POTASSIUM SERPL-SCNC: 3.9 MMOL/L (ref 3.5–5.2)
RBC # BLD AUTO: 4.24 X10E6/UL (ref 3.77–5.28)
SODIUM SERPL-SCNC: 138 MMOL/L (ref 134–144)
VIT B1 BLD-SCNC: 73.1 NMOL/L (ref 66.5–200)
VIT B12 SERPL-MCNC: 352 PG/ML (ref 232–1245)
WBC # BLD AUTO: 5.1 X10E3/UL (ref 3.4–10.8)

## 2018-12-26 NOTE — PROGRESS NOTES
Attempted to contact patient to discuss vit d deficiency and iron deficient anemia for which she will likely need Fe transfusion (she claims to be vitamin compliant. Will attempt to contact patient again later today or tomorrow from the office.

## 2018-12-27 ENCOUNTER — TELEPHONE (OUTPATIENT)
Dept: SURGERY | Age: 28
End: 2018-12-27

## 2018-12-27 NOTE — PROGRESS NOTES
Left second message asking patient to call me back to discuss vit d deficiency and Fe def anemia (as well as the possible need for referral to Heme for Fe infusions).

## 2018-12-28 ENCOUNTER — TELEPHONE (OUTPATIENT)
Dept: SURGERY | Age: 28
End: 2018-12-28

## 2018-12-28 NOTE — TELEPHONE ENCOUNTER
Patient returning my call with regards to review of her recent labs. Pt has not been taking vitamin d or iron. She does have a history of anemia as well as iron deficiency anemia. She has required iron and blood transfusions in the past.  She states her baseline hemoglobin is somewhere around 11. She claims to be taking her multivitamin as directed. The patient is hesitant to undergo another iron transfusion. Given the fact that she has been off of her iron I believe it would be appropriate to restart her iron and recheck her blood levels in approximately 3 months time. She is agreeable to this plan. She will also start her vitamin D again. Should she become symptomatic or have any other issues she will not hesitate to contact us report to the emergency department for further care. She states that she \"feels much better than at the time I saw her last.\"  She understands that at the time of her next visit in February should her iron levels not respond she may require an iron infusion at that time.     Dc Avery MS, PA-C

## 2018-12-28 NOTE — PROGRESS NOTES
Pt not taking vitamin d or iron. She does have a history of anemia as well as iron deficiency anemia. She has required iron and blood transfusions in the past.  She states her baseline hemoglobin is somewhere around 11. She claims to be taking her multivitamin as directed. The patient is hesitant to undergo another iron transfusion. Given the fact that she has been off of her iron I believe it would be appropriate to restart her iron and recheck her blood levels in approximately 3 months time. She is agreeable to this plan. She will also start her vitamin D again. Should she become symptomatic or have any other issues she will not hesitate to contact us report to the emergency department for further care. She states that she \"feels much better than at the time I saw her last.\"  She understands that at the time of her next visit in February should her iron levels not respond she may require an iron infusion at that time.

## 2019-02-09 ENCOUNTER — HOSPITAL ENCOUNTER (OUTPATIENT)
Dept: LAB | Age: 29
Discharge: HOME | End: 2019-02-09

## 2019-02-09 LAB — XX-LABCORP SPECIMEN COL,LCBCF: NORMAL

## 2019-02-09 PROCEDURE — 99001 SPECIMEN HANDLING PT-LAB: CPT

## 2019-02-14 LAB
25(OH)D3+25(OH)D2 SERPL-MCNC: 14.7 NG/ML (ref 30–100)
ALBUMIN SERPL-MCNC: 4.5 G/DL (ref 3.5–5.5)
BASOPHILS # BLD AUTO: 0 X10E3/UL (ref 0–0.2)
BASOPHILS NFR BLD AUTO: 0 %
BUN SERPL-MCNC: 13 MG/DL (ref 6–20)
BUN/CREAT SERPL: 17 (ref 9–23)
CALCIUM SERPL-MCNC: 9.3 MG/DL (ref 8.7–10.2)
CHLORIDE SERPL-SCNC: 104 MMOL/L (ref 96–106)
CHOLEST SERPL-MCNC: 175 MG/DL (ref 100–199)
CO2 SERPL-SCNC: 22 MMOL/L (ref 20–29)
CREAT SERPL-MCNC: 0.76 MG/DL (ref 0.57–1)
EOSINOPHIL # BLD AUTO: 0.1 X10E3/UL (ref 0–0.4)
EOSINOPHIL NFR BLD AUTO: 2 %
ERYTHROCYTE [DISTWIDTH] IN BLOOD BY AUTOMATED COUNT: 19.2 % (ref 12.3–15.4)
FERRITIN SERPL-MCNC: 5 NG/ML (ref 15–150)
FOLATE SERPL-MCNC: 12 NG/ML
GLUCOSE SERPL-MCNC: 82 MG/DL (ref 65–99)
HCT VFR BLD AUTO: 30.4 % (ref 34–46.6)
HDLC SERPL-MCNC: 73 MG/DL
HGB BLD-MCNC: 8.7 G/DL (ref 11.1–15.9)
IMM GRANULOCYTES # BLD AUTO: 0 X10E3/UL (ref 0–0.1)
IMM GRANULOCYTES NFR BLD AUTO: 0 %
IRON SERPL-MCNC: 199 UG/DL (ref 27–159)
LDLC SERPL CALC-MCNC: 85 MG/DL (ref 0–99)
LYMPHOCYTES # BLD AUTO: 1.5 X10E3/UL (ref 0.7–3.1)
LYMPHOCYTES NFR BLD AUTO: 26 %
MCH RBC QN AUTO: 20.5 PG (ref 26.6–33)
MCHC RBC AUTO-ENTMCNC: 28.6 G/DL (ref 31.5–35.7)
MCV RBC AUTO: 72 FL (ref 79–97)
MONOCYTES # BLD AUTO: 0.7 X10E3/UL (ref 0.1–0.9)
MONOCYTES NFR BLD AUTO: 12 %
NEUTROPHILS # BLD AUTO: 3.5 X10E3/UL (ref 1.4–7)
NEUTROPHILS NFR BLD AUTO: 60 %
PLATELET # BLD AUTO: 267 X10E3/UL (ref 150–379)
POTASSIUM SERPL-SCNC: 4.1 MMOL/L (ref 3.5–5.2)
RBC # BLD AUTO: 4.25 X10E6/UL (ref 3.77–5.28)
SODIUM SERPL-SCNC: 140 MMOL/L (ref 134–144)
TRIGL SERPL-MCNC: 84 MG/DL (ref 0–149)
VIT B1 BLD-SCNC: 87.3 NMOL/L (ref 66.5–200)
VIT B12 SERPL-MCNC: 300 PG/ML (ref 232–1245)
VLDLC SERPL CALC-MCNC: 17 MG/DL (ref 5–40)
WBC # BLD AUTO: 5.9 X10E3/UL (ref 3.4–10.8)

## 2019-02-18 ENCOUNTER — OFFICE VISIT (OUTPATIENT)
Dept: SURGERY | Age: 29
End: 2019-02-18

## 2019-02-18 ENCOUNTER — TELEPHONE (OUTPATIENT)
Dept: SURGERY | Age: 29
End: 2019-02-18

## 2019-02-18 VITALS
OXYGEN SATURATION: 98 % | WEIGHT: 260 LBS | HEIGHT: 69 IN | TEMPERATURE: 98.9 F | DIASTOLIC BLOOD PRESSURE: 80 MMHG | RESPIRATION RATE: 18 BRPM | SYSTOLIC BLOOD PRESSURE: 128 MMHG | HEART RATE: 72 BPM | BODY MASS INDEX: 38.51 KG/M2

## 2019-02-18 DIAGNOSIS — E55.9 HYPOVITAMINOSIS D: ICD-10-CM

## 2019-02-18 DIAGNOSIS — R63.5 WEIGHT GAIN: ICD-10-CM

## 2019-02-18 DIAGNOSIS — D50.9 IRON DEFICIENCY ANEMIA, UNSPECIFIED IRON DEFICIENCY ANEMIA TYPE: ICD-10-CM

## 2019-02-18 DIAGNOSIS — Z98.84 HISTORY OF GASTRIC BYPASS: ICD-10-CM

## 2019-02-18 DIAGNOSIS — E66.01 MORBID OBESITY (HCC): Primary | ICD-10-CM

## 2019-02-18 DIAGNOSIS — K91.2 POST-RESECTION MALABSORPTION: ICD-10-CM

## 2019-02-18 PROBLEM — Z3A.01 LESS THAN 8 WEEKS GESTATION OF PREGNANCY: Status: RESOLVED | Noted: 2017-01-24 | Resolved: 2019-02-18

## 2019-02-18 PROBLEM — Z3A.32 32 WEEKS GESTATION OF PREGNANCY: Status: RESOLVED | Noted: 2017-07-27 | Resolved: 2019-02-18

## 2019-02-18 PROBLEM — Z3A.20 20 WEEKS GESTATION OF PREGNANCY: Status: RESOLVED | Noted: 2017-04-27 | Resolved: 2019-02-18

## 2019-02-18 RX ORDER — BUTALB/ACETAMINOPHEN/CAFFEINE 50-325-40
500 TABLET ORAL 3 TIMES DAILY
COMMUNITY

## 2019-02-18 NOTE — TELEPHONE ENCOUNTER
Called Dr. Tee Bustillos at Hematology to schedule pt for referral. Left message to return my call. Faxed referral to his office at [phone].

## 2019-02-18 NOTE — PROGRESS NOTES
Bariatric Follow Up Note    [address] is 4 years 6 mos s/p gastric bypass and a little over a year post partum. Doing well overall. Previous episodes of n/v cramping with eating too much or too quickly have improved. Returns for close follow up in effort to get back on track after completing her course of breast feeding. Pleased with 7 lbs weight loss since last visit. Currently on a bariatric diet without difficulty. Eating largest meal at night. Taking in 60-80oz water,  Still Not counting g protein and admits to eating \"too many carbs,\" but has cut down. Activity has increased. via walking with the kids The patient denies hair loss. Bowel movements are regular. The patient is not having any pain. . She is now compliant with multivitamins, She has increased Fe,  Protein, calcium, Vit D and B12 supplements. The patient reports some difficulty eating/drinking as outlined above. The patient denies smoking,NSAID use and carbonation ingestion. Pt admits to [facility] intake. Weight Loss Metrics 2/18/2019 2/18/2019 11/12/2018 1/10/2018 10/6/2017 7/27/2017 4/27/2017   Pre op / Initial Wt 302 - - - - - -   Today's Wt - 260 lb 266 lb 235 lb 225 lb 233 lb 212 lb   BMI - 38.4 kg/m2 39.28 kg/m2 34.7 kg/m2 33.23 kg/m2 34.41 kg/m2 31.31 kg/m2   Ideal Body Wt 146 - - - - - -   Excess Body Wt 156 - - - - - -   Goal Wt 178 - - - - - -   Wt loss to date 42 - - - - - -   % Wt Loss 0.34 - - - - - -   80% .8 - - - - - -       Body mass index is 38.4 kg/m².     Comorbidities:     Hypertension: not applicable  Diabetes: not applicable  Obstructive Sleep Apnea: resolved  Hyperlipidemia: not applicable  Stress Urinary Incontinence: not applicable  Gastroesophageal Reflux: not applicable  Weight related arthropathy:not applicable     Past Medical History:   Diagnosis Date    Arrhythmia     palpitations/pt denies    Asthma     Morbid obesity with BMI of 50.0-59.9, adult ([facility])     Sleep apnea     Unspecified sleep apnea        Past Surgical History:   Procedure Laterality Date    ABDOMEN SURGERY PROC UNLISTED  2014    gastric bypass with repair hiatal hernia    DELIVERY       x 3    HX GI      gastric bypass    HX GYN      c section x 4    HX ORTHOPAEDIC  2017    R ring finger fx repair, due to scar tissue       Current Outpatient Medications   Medication Sig Dispense Refill    calcium citrate-vitamin d2 1,500-200 mg-unit tab Take  by mouth.  montelukast (SINGULAIR) 10 mg tablet Take 10 mg by mouth daily.  sertraline (ZOLOFT) 50 mg tablet   6    multivitamin with iron (FLINTSTONES) chewable tablet Take 1 tablet by mouth two (2) times a day.  Cholecalciferol, Vitamin D3, (VITAMIN D3) 2,000 unit cap capsule Take  by mouth two (2) times a day.  cyanocobalamin (VITAMIN B12) 500 mcg tablet Take 500 mcg by mouth daily.  albuterol (PROAIR HFA) 90 mcg/actuation inhaler Take 2 Puffs by inhalation as needed. No Known Allergies    ROS:  Review of Systems:  Positives in bold     Constitutional:Unexpected weight gain/loss, night sweats,fatigue/maliase/lethargy, change in sleep, fever, rash  Eyes:  Visual changes, headaches, eye pain, floaters  ENT:  Rhinorrhea, epistaxis, change in hearing, gingival bleeding, sore throat, dysphagia  CV:  Denies chest pain, shortness of breath, difficulty breathing, orthopnea, palpitations, loss of consciousness, claudication  Resp: Cough, wheeze, haemoptysis, sob, exercise intolerence  GI:  Abdominal pain, dysphagia, reflux, boating, cramping. Obstipation, haematemesis, brbpr, hematochezia,dark tarry stools. Nausea, vomitting, diarrhea, constipation. Neuro: Paresthesias, numbness, weakness, changes in balance, changes in speech, loss of control of bowel or bladder,   Psych: Changes in depression, anxiety, sleep patterns, change in energy Levels     Remainder of ROS as per HPI.             Physicial Exam:  Visit Vitals  /80 (BP 1 Location: Left arm, BP Patient Position: Sitting)   Pulse 72   Temp 98.9 °F (37.2 °C) (Oral)   Resp 18   Ht 5' 9\" (1.753 m)   Wt 117.9 kg (260 lb)   SpO2 98%   BMI 38.40 kg/m²         General: AAOX3, pleasant and cooperative to exam. Appropriately groomed. NAD. Non-toxic in appearance. Appears stated age. HENT: NC/AT. PERRLA. Extraocular motions are intact. Sclera anicteric, Conjunctiva Clear. Nares clear. Oropharynx pink, moist without exudate or erythema. Uvula Midline. Neck:  Supple, trachea is midline. No JVD, Lymphadenopathy. No bruits. Chest: Good equal bilateral expansion  Lungs: Clear to auscultation bilaterally without e/o crackles, wheezes or rhales. Heart: RRR, S1 and S2 noted. No c/r/m/g/vpmi. Abdomen: obese, soft and non-tender without distension. Good bowel sounds. No vis/palp masses or pulsations. No organo-splenomegaly. No hernias to my exam. No e/o acute abdomen or peritoneal signs. Previous surgical wounds well-healed. Pelvis: Stable. :      Deferred  Rectal: Deferred  Extremities: Positive pulses in all 4 extremities. Baseline range of motion in all 4 extremities. Strength, sensation and reflexes intact, appropriate and equal in b/l upper and lower extremities. No C/C/E  Neuro: CN II-XII grossly intact without focal deficit. Ambulatory. Skin: Clean, warm and dry. Labs: Significant for severe Fe deficiency anemia requiring IV Fe infusions. Will plan referral to hematology. Continue increased Vit D and will follow labs closely. Assessment/Plan: Pt is currently 4 years 6mos s/p laparoscopic gastric bypass and subsequent weight gain with pregnancy with a total weight loss of 47lbs to date, getting back on track. Heme consult as outlined above. STOP etOH. Don't eat largest meal of the day as last meal of the day. Decrease carbs. Exercise! Stressed importance of hydration with SF clear liquids until urine clear.   OK to continue bariatric diet, with food content mainly meats/veggies. Encouraged support group attendance, recommended dietician visit with food diary. Advised exercise program of 20-30 minutes daily 5-7 times per week. Recommended utilizing bariatric multivitamins or at least adult chewable multivitamins in lieu of flintstones and/or flintstones gummies. Follow up 6 weeks, sooner as needed. Health Maintenance issues reviewed and except as it relates to bariatrics, deferred to primary care. Greater than 50% of this 30 minute visit was spent couseling the patient about the aforementioned issues.          Lisa Angeles MS, PA-C

## 2019-02-18 NOTE — PROGRESS NOTES
Chief Complaint   Patient presents with    Morbid Obesity     LGBP 8/12/2014     Pt ID confirmed    Weight Loss Metrics 2/18/2019 2/18/2019 11/12/2018 1/10/2018 10/6/2017 7/27/2017 4/27/2017   Pre op / Initial Wt 302 - - - - - -   Today's Wt - 260 lb 266 lb 235 lb 225 lb 233 lb 212 lb   BMI - 38.4 kg/m2 39.28 kg/m2 34.7 kg/m2 33.23 kg/m2 34.41 kg/m2 31.31 kg/m2   Ideal Body Wt 146 - - - - - -   Excess Body Wt 156 - - - - - -   Goal Wt 178 - - - - - -   Wt loss to date 42 - - - - - -   % Wt Loss 0.34 - - - - - -   80% .8 - - - - - -       Body mass index is 38.4 kg/m².     Post op medications:  MVI: flintstones twice daily  Calcium Citrate: 1500 milligrams  Actigal N/A  B-12 500 micrograms  Vit D 3 2000 units

## 2019-02-25 ENCOUNTER — OFFICE VISIT (OUTPATIENT)
Dept: ONCOLOGY | Age: 29
End: 2019-02-25

## 2019-02-25 ENCOUNTER — HOSPITAL ENCOUNTER (OUTPATIENT)
Dept: INFUSION THERAPY | Age: 29
Discharge: HOME | End: 2019-02-25

## 2019-02-25 VITALS
OXYGEN SATURATION: 97 % | TEMPERATURE: 97.6 F | HEART RATE: 68 BPM | SYSTOLIC BLOOD PRESSURE: 125 MMHG | RESPIRATION RATE: 18 BRPM | DIASTOLIC BLOOD PRESSURE: 81 MMHG | BODY MASS INDEX: 39.28 KG/M2 | WEIGHT: 266 LBS

## 2019-02-25 DIAGNOSIS — Z98.84 GASTRIC BYPASS STATUS FOR OBESITY: Primary | ICD-10-CM

## 2019-02-25 NOTE — PROGRESS NOTES
Stevie Fang is a 30 y.o. female presenting today for a new patient appointment r/t iron deficiency anemia. Patient is ambulatory with no assistive devices and denies any complaints. Chief Complaint   Patient presents with    Anemia     new patient       Visit Vitals  /81 (BP 1 Location: Right arm, BP Patient Position: Sitting)   Pulse 68   Temp 97.6 °F (36.4 °C) (Oral)   Resp 18   Wt 120.7 kg (266 lb)   LMP 02/13/2019   SpO2 97%   BMI 39.28 kg/m²       Current Outpatient Medications   Medication Sig    calcium citrate-vitamin d2 1,500-200 mg-unit tab Take  by mouth.  montelukast (SINGULAIR) 10 mg tablet Take 10 mg by mouth daily.  sertraline (ZOLOFT) 50 mg tablet     multivitamin with iron (FLINTSTONES) chewable tablet Take 1 tablet by mouth two (2) times a day.  Cholecalciferol, Vitamin D3, (VITAMIN D3) 2,000 unit cap capsule Take  by mouth two (2) times a day.  cyanocobalamin (VITAMIN B12) 500 mcg tablet Take 500 mcg by mouth daily.  albuterol (PROAIR HFA) 90 mcg/actuation inhaler Take 2 Puffs by inhalation as needed. No current facility-administered medications for this visit.         Medications no longer taking/discontinued: none        Learning Assessment 11/12/2018   PRIMARY LEARNER Patient   BARRIERS PRIMARY LEARNER NONE   PRIMARY LANGUAGE ENGLISH   LEARNER PREFERENCE PRIMARY READING     -   ANSWERED BY self   RELATIONSHIP SELF

## 2019-02-25 NOTE — PROGRESS NOTES
[facility]  [id number] [place] [address], [address]  [facility]  Office Phone: [phone]  Fax: [phone]      Reason for visit:  SOILA    HPI:   Citlaly Vaz is a 30 y.o.  female who I was asked to see in consultation at the request of Dr. Iglesia Martinez for evaluation for iron deficiency anemia. LMP=19 with blood clots    DX: SOILA      Past Medical History:   Diagnosis Date    Arrhythmia     palpitations/pt denies    Asthma     Morbid obesity with BMI of 50.0-59.9, adult ([facility] [address]     Sleep apnea     Unspecified sleep apnea      Past Surgical History:   Procedure Laterality Date    ABDOMEN SURGERY PROC UNLISTED  2014    gastric bypass with repair hiatal hernia    DELIVERY       x 3    HX GI      gastric bypass    HX GYN      c section x 4    HX ORTHOPAEDIC  2017    R ring finger fx repair, due to scar tissue     Social History     Socioeconomic History    Marital status: SINGLE     Spouse name: Not on file    Number of children: Not on file    Years of education: Not on file    Highest education level: Not on file   Tobacco Use    Smoking status: Former Smoker     Last attempt to quit: 2014     Years since quittin.7    Smokeless tobacco: Never Used   Substance and Sexual Activity    Alcohol use: Yes     Alcohol/week: 1.0 oz     Types: 2 Standard drinks or equivalent per week     Comment: occassionally    Drug use: No    Sexual activity: Yes     Birth control/protection: None     Family History   Problem Relation Age of Onset    No Known Problems Father     Obesity Mother     Asthma Mother        Current Outpatient Medications   Medication Sig Dispense Refill    calcium citrate-vitamin d2 1,500-200 mg-unit tab Take  by mouth.  montelukast (SINGULAIR) 10 mg tablet Take 10 mg by mouth daily.       sertraline (ZOLOFT) 50 mg tablet   6    multivitamin with iron (FLINTSTONES) chewable tablet Take 1 tablet by mouth two (2) times a day.  Cholecalciferol, Vitamin D3, (VITAMIN D3) 2,000 unit cap capsule Take  by mouth two (2) times a day.  cyanocobalamin (VITAMIN B12) 500 mcg tablet Take 500 mcg by mouth daily.  albuterol (PROAIR HFA) 90 mcg/actuation inhaler Take 2 Puffs by inhalation as needed. No Known Allergies    Review of Systems  Denies any fever, chills, SOB, NV or abdominal pain. No epistaxis, hematemesis or hematochezia. No melena. No focal neurologic deficit. She has PICA with pagophagia (44 oz daily). No craving for dirt, uncooked pasta etc. Feels very tired. Objective:  Physical Exam:  Visit Vitals  /81 (BP 1 Location: Right arm, BP Patient Position: Sitting)   Pulse 68   Temp 97.6 °F (36.4 °C) (Oral)   Resp 18   Wt 120.7 kg (266 lb)   LMP 02/13/2019   SpO2 97%   BMI 39.28 kg/m²         General:  Alert, cooperative, no distress, appears stated age. Head:  Normocephalic, without obvious abnormality, atraumatic. Eyes:  Conjunctivae/corneas clear. PERRL, EOMs intact. Throat: Lips, mucosa, and tongue normal.    Neck: Supple, symmetrical, trachea midline, no adenopathy, thyroid: no enlargement/tenderness/nodules   Back:   Symmetric, no curvature. ROM normal. No CVA tenderness. Lungs:   Clear to auscultation bilaterally. Chest wall:  No tenderness or deformity. Heart:  Regular rate and rhythm, S1, S2 normal, no murmur, click, rub or gallop. Abdomen:   Soft, non-tender. Bowel sounds normal. No masses,  No organomegaly. Extremities: Extremities normal, atraumatic, no cyanosis or edema. Skin: Skin color, texture, turgor normal. No rashes or lesions. Lymph nodes: Cervical, supraclavicular, and axillary nodes normal.   Neurologic: CNII-XII intact.        Diagnostic Imaging     Results for orders placed during the hospital encounter of 05/27/14   CT HEAD WO CONT    Narrative CT head without IV contrast    Comparison: None    Clinical information: Dizziness. Procedure:    CT of the head was performed without intravenous contrast.    Findings:    No intracranial hemorrhage, extra-axial fluid collection, mass, or mass effect. No shift of midline structures. No convincing acute ischemia. No skull  fracture. Paranasal sinuses and mastoid air cells look normal.      Impression Impression:    No acute intracranial abnormality. Lab Results  Lab Results   Component Value Date/Time    WBC 5.9 02/09/2019 12:00 AM    HGB 8.7 (L) 02/09/2019 12:00 AM    HCT 30.4 (L) 02/09/2019 12:00 AM    PLATELET 743 25/76/7138 12:00 AM    MCV 72 (L) 02/09/2019 12:00 AM       Lab Results   Component Value Date/Time    Sodium 140 02/09/2019 12:00 AM    Potassium 4.1 02/09/2019 12:00 AM    Chloride 104 02/09/2019 12:00 AM    CO2 22 02/09/2019 12:00 AM    Anion gap 4 05/09/2016 09:15 PM    Glucose 82 02/09/2019 12:00 AM    BUN 13 02/09/2019 12:00 AM    Creatinine 0.76 02/09/2019 12:00 AM    BUN/Creatinine ratio 17 02/09/2019 12:00 AM    GFR est  02/09/2019 12:00 AM    GFR est non- 02/09/2019 12:00 AM    Calcium 9.3 02/09/2019 12:00 AM    AST (SGOT) 20 10/02/2017 12:00 AM    Alk. phosphatase 83 10/02/2017 12:00 AM    Protein, total 7.5 10/02/2017 12:00 AM    Albumin 4.5 02/09/2019 12:00 AM    Globulin 3.5 08/11/2015 01:15 PM    A-G Ratio 1.2 10/02/2017 12:00 AM    ALT (SGPT) 17 10/02/2017 12:00 AM       Assessment/Plan:  30 y.o. female with SOILA    1. Iron deficiency anemia    Ferritin=5. Refer to GI  F/U OBGYN  Give Injectafer x2    2. Vitamin B12 deficiency: Take pill daily    3.  Menorrhagia:   F/U with OBGYN    4. H/o gastric bypass  Give IV iron  Continue B12    RTC 2months

## 2019-03-13 ENCOUNTER — DOCUMENTATION ONLY (OUTPATIENT)
Dept: BARIATRICS/WEIGHT MGMT | Age: 29
End: 2019-03-13

## 2019-03-13 ENCOUNTER — HOSPITAL ENCOUNTER (OUTPATIENT)
Dept: BARIATRICS/WEIGHT MGMT | Age: 29
Discharge: HOME | End: 2019-03-13

## 2019-03-13 NOTE — PROGRESS NOTES
[address] [place] [facility], [address]      Patient's Name: Sravani Puckett   Age: 30 y.o. YOB: 1990   Sex: female    Date:   3/13/2019    Height: 5 f 9 Weight:    266      Lbs. BMI: 39.4     Surgery Date: 8/12/14    Starting Weight: 302   Last Recorded Weight:   Overall Pounds Lost: 36     Procedure: Gastric Bypass    Lowest Weight patient has achieved since surgery: 165    How long has patient been at this weight for: Since summer. Diet History (reported by patient on diet history form)    Do you smoke: None    Alcohol Intake: 1 drinks at a time,   1 times a week. Meals per day: 3-4    Portion sizes ~: Hand size. Patient states she still will feel restriction. Diet History:  Breakfast:  5 am:  Eggs with cheese. 11:00 am: Patient states she will eat leftover dinner. She states this may be chicken, rice, vegetables. Lunch-Dinner:  Crackers and cheese. Dinner (states this is the heaviest meal):  Chicken, rice, spaghetti. Whatever is available. She states she goes to bed around 8 pm.     Simple sugar intake: None. Experiencing dumping syndrome: Patient states she does get dumping syndrome if she eats sweets. Carbohydrate intake: Rice, crackers, noodles, pasta. She may do a hoagie bun. Symptoms that occur when carbohydrates are consumed: Sluggish, rapid heart rate    Ounces of fluid consumed per day: 48     Fluids being consumed: 3 bottles of water    Patient is not drinking protein drinks    Patient is snacking on: cheese and crackers    Exercise History    Patient is doing very little for exercise. She lives in [place], works in [place]. She states where she works now it is dangerous to walk. Vitamins    Patient is not taking multivitamins per day    Patient has been educated     Patient is taking Calcium in the form of pill. Patient is taking 500 mg per day.     Patient is not taking Vitamin B12. Patient is not taking Vitamin D 3 today. Patient is not taking additional iron. Summary:  Weight loss is at 36 pounds. I have reinforced the key diet principles to the patient. Patient was instructed to follow a 3 meal a day routine. I have reinforced the importance of filling up on meat and vegetables and avoiding carbohydrates. I have talked with patient about reactive hypoglycemia and although carbohydrates are not good from a weight-management point of view, they are actually very dangerous and should be avoided. If patient is getting hungry between meals focus on meat and vegetables and a list of food choices was reviewed with patient. Reinforced to patient the importance of being properly hydrated and the need to get 64 ounces of fluid in per day. Reinforced to patient the need to do 30 minutes of exercise per day. We also spent some time talking about the vitamins and the importance of taking them. Reinforced the dosage of vitamins to patient. Patient was reminded that the vitamins are lifelong. Other Pertinent Information:  Patient had a baby 18 month ago. She states she was around 180 when she got pregnant, but it has been slowly increasing since she gave birth. Patient and I talked about going back on a liquid diet for a week and then reintroducing meat and vegetables only. We talked about reactive hypoglycemia and stopping all carbohydrates. She is also non-compliant with vitamins and has been re-educating on the dosing and what she needs to be taking. Patient will follow up with me in 2 months.     Alyl Moyer Magdal[address] RD  3/13/2019

## 2019-03-15 ENCOUNTER — HOSPITAL ENCOUNTER (OUTPATIENT)
Dept: INFUSION THERAPY | Age: 29
Discharge: HOME | End: 2019-03-15
Payer: MEDICAID

## 2019-03-15 VITALS
DIASTOLIC BLOOD PRESSURE: 83 MMHG | SYSTOLIC BLOOD PRESSURE: 121 MMHG | HEART RATE: 76 BPM | OXYGEN SATURATION: 99 % | RESPIRATION RATE: 18 BRPM | TEMPERATURE: 98.3 F

## 2019-03-15 PROCEDURE — 96365 THER/PROPH/DIAG IV INF INIT: CPT

## 2019-03-15 PROCEDURE — 74011250636 HC RX REV CODE- 250/636: Performed by: INTERNAL MEDICINE

## 2019-03-15 RX ADMIN — FERRIC CARBOXYMALTOSE INJECTION 750 MG: 50 INJECTION, SOLUTION INTRAVENOUS at 15:21

## 2019-03-22 ENCOUNTER — APPOINTMENT (OUTPATIENT)
Dept: INFUSION THERAPY | Age: 29
End: 2019-03-22
Payer: MEDICAID

## 2019-03-25 ENCOUNTER — HOSPITAL ENCOUNTER (OUTPATIENT)
Dept: INFUSION THERAPY | Age: 29
Discharge: HOME | End: 2019-03-25
Payer: MEDICAID

## 2019-03-25 VITALS
SYSTOLIC BLOOD PRESSURE: 140 MMHG | OXYGEN SATURATION: 100 % | DIASTOLIC BLOOD PRESSURE: 99 MMHG | HEART RATE: 77 BPM | TEMPERATURE: 97.3 F

## 2019-03-25 PROCEDURE — 96374 THER/PROPH/DIAG INJ IV PUSH: CPT

## 2019-03-25 PROCEDURE — 74011250636 HC RX REV CODE- 250/636: Performed by: INTERNAL MEDICINE

## 2019-03-25 RX ADMIN — FERRIC CARBOXYMALTOSE INJECTION 750 MG: 50 INJECTION, SOLUTION INTRAVENOUS at 15:18

## 2019-03-29 ENCOUNTER — APPOINTMENT (OUTPATIENT)
Dept: INFUSION THERAPY | Age: 29
End: 2019-03-29
Payer: MEDICAID

## 2019-04-01 ENCOUNTER — OFFICE VISIT (OUTPATIENT)
Dept: SURGERY | Age: 29
End: 2019-04-01

## 2019-04-01 ENCOUNTER — HOSPITAL ENCOUNTER (OUTPATIENT)
Dept: LAB | Age: 29
Discharge: HOME | End: 2019-04-01

## 2019-04-01 VITALS
WEIGHT: 265 LBS | TEMPERATURE: 98.1 F | BODY MASS INDEX: 39.25 KG/M2 | RESPIRATION RATE: 184 BRPM | SYSTOLIC BLOOD PRESSURE: 132 MMHG | HEIGHT: 69 IN | OXYGEN SATURATION: 99 % | DIASTOLIC BLOOD PRESSURE: 84 MMHG | HEART RATE: 61 BPM

## 2019-04-01 DIAGNOSIS — Z98.84 HISTORY OF GASTRIC BYPASS: ICD-10-CM

## 2019-04-01 DIAGNOSIS — E66.01 MORBID OBESITY (HCC): Primary | ICD-10-CM

## 2019-04-01 DIAGNOSIS — R63.5 WEIGHT GAIN: ICD-10-CM

## 2019-04-01 DIAGNOSIS — D50.8 IRON DEFICIENCY ANEMIA DUE TO DIETARY CAUSES: ICD-10-CM

## 2019-04-01 LAB — XX-LABCORP SPECIMEN COL,LCBCF: NORMAL

## 2019-04-01 PROCEDURE — 99001 SPECIMEN HANDLING PT-LAB: CPT

## 2019-04-01 RX ORDER — LANOLIN ALCOHOL/MO/W.PET/CERES
CREAM (GRAM) TOPICAL DAILY
COMMUNITY

## 2019-04-01 NOTE — PROGRESS NOTES
Bariatric Follow Up Note    [address] is a 30 y.o. female is now 4 years 7 months status post gastric bypass and a little over a year postpartum. Doing well overall. Currently on a bariatric diet without difficulty. Taking in >60-80oz fluid,  40-60g protein. Walking 60 min of activity 2 days a week. The patient denies hair loss. Bowel movements are regular. The patient is not having any pain. . She claims to be compliant with multivitamins, Protein, calcium, Vit D and B12 supplements. The patient is following up closely in an effort to get \"back on track. \"  The patient has followed up with hematology with regard to her iron deficiency anemia as asked to do. The patient reports no difficulty eating/drinking. But admits to eating too many carbohydrates. The patient denies smoking, etOH use, NSAID use and carbonation ingestion. Weight Loss Metrics 4/1/2019 4/1/2019 2/25/2019 2/18/2019 2/18/2019 11/12/2018 1/10/2018   Pre op / Initial Wt 302 - - 302 - - -   Today's Wt - 265 lb 266 lb - 260 lb 266 lb 235 lb   BMI - 39.13 kg/m2 39.28 kg/m2 - 38.4 kg/m2 39.28 kg/m2 34.7 kg/m2   Ideal Body Wt 146 - - 146 - - -   Excess Body Wt 156 - - 156 - - -   Goal Wt 178 - - 178 - - -   Wt loss to date 37 - - 42 - - -   % Wt Loss 0.3 - - 0.34 - - -   80% .8 - - 124.8 - - -       Body mass index is 39.13 kg/m².         Comorbidities:     Hypertension: not applicable  Diabetes: not applicable  Obstructive Sleep Apnea: resolved  Hyperlipidemia: not applicable  Stress Urinary Incontinence: not applicable  Gastroesophageal Reflux: not applicable  Weight related arthropathy:not applicable            Past Medical History:   Diagnosis Date    Anemia     Arrhythmia     palpitations/pt denies    Asthma     Depression     Dizziness     daily     Joint pain     Morbid obesity with BMI of 50.0-59.9, adult (HCC)     Shortness of breath     Sleep apnea     Unspecified sleep apnea        Past Surgical History:   Procedure Laterality Date    ABDOMEN SURGERY PROC UNLISTED  2014    gastric bypass with repair hiatal hernia    DELIVERY       x 3    HX GI      gastric bypass    HX GYN      c section x 4    HX ORTHOPAEDIC  2017    R ring finger fx repair, due to scar tissue       Current Outpatient Medications   Medication Sig Dispense Refill    thiamine HCL (VITAMIN B-1) 100 mg tablet Take  by mouth daily.  calcium citrate-vitamin d2 1,500-200 mg-unit tab Take  by mouth.  montelukast (SINGULAIR) 10 mg tablet Take 10 mg by mouth daily.  multivitamin with iron (FLINTSTONES) chewable tablet Take 1 tablet by mouth two (2) times a day.  Cholecalciferol, Vitamin D3, (VITAMIN D3) 2,000 unit cap capsule Take  by mouth two (2) times a day.  cyanocobalamin (VITAMIN B12) 500 mcg tablet Take 500 mcg by mouth two (2) times a day.  albuterol (PROAIR HFA) 90 mcg/actuation inhaler Take 2 Puffs by inhalation as needed. Allergies   Allergen Reactions    Pollen Extracts Other (comments)     Nasal symptoms       ROS:  Review of Systems:  Positives in bold     Constitutional:Unexpected weight gain/loss, night sweats,fatigue/maliase/lethargy, change in sleep, fever, rash  Eyes:  Visual changes, headaches, eye pain, floaters  ENT:  Rhinorrhea, epistaxis, change in hearing, gingival bleeding, sore throat, dysphagia  CV:  Denies chest pain, shortness of breath, difficulty breathing, orthopnea, palpitations, loss of consciousness, claudication  Resp: Cough, wheeze, haemoptysis, sob, exercise intolerence  GI:  Abdominal pain, dysphagia, reflux, bloating, cramping.  Obstipation, haematemesis, brbpr, hematochezia,dark tarry stools.  Nausea, vomitting, diarrhea, constipation.    Neuro: Paresthesias, numbness, weakness, changes in balance, changes in speech, loss of control of bowel or bladder,   Psych: Changes in depression, anxiety, sleep patterns, change in energy Levels     Remainder of ROS as per HPI    Physicial Exam:  Visit Vitals  /84 (BP 1 Location: Right arm, BP Patient Position: Sitting)   Pulse 61   Temp 98.1 °F (36.7 °C) (Oral)   Resp (!) 184   Ht 5' 9\" (1.753 m)   Wt 120.2 kg (265 lb)   SpO2 99%   BMI 39.13 kg/m²         General: AAOX3, pleasant and cooperative to exam. Appropriately groomed. NAD. Non-toxic in appearance. Appears stated age. HENT: NC/AT. PERRLA. Extraocular motions are intact. Sclera anicteric, Conjunctiva Clear. Nares clear. Oropharynx pink, moist without exudate or erythema. Uvula Midline. Neck:  Supple, trachea is midline. No JVD, Lymphadenopathy. No bruits. Chest: Good equal bilateral expansion  Lungs: Clear to auscultation bilaterally without e/o crackles, wheezes or rhales. Heart: RRR, S1 and S2 noted. No c/r/m/g/vpmi. Abdomen: obese, soft and non-tender without distension. Good bowel sounds. No vis/palp masses or pulsations. No organo-splenomegaly. No hernias to my exam. No e/o acute abdomen or peritoneal signs. Previous surgical wounds well-healed. Pelvis: Stable. :  Deferred  Rectal: Deferred  Extremities: Positive pulses in all 4 extremities. Baseline range of motion in all 4 extremities. Strength, sensation and reflexes intact, appropriate and equal in b/l upper and lower extremities. No C/C/E  Neuro: CN II-XII grossly intact without focal deficit. Ambulatory. Skin: Clean, warm and dry. Labs: Labs reviewed and discussed with patient. Previous labs significant for hypovitaminosis D. Patient has been started on increased vitamin D. The patient has followed up with Dr. Panchito Payan from hematology with regards to her iron deficiency anemia    Assessment/Plan: Pt is currently 4 years 7 months s/p gastric bypass surgery and approximately 1 year postpartum with a total weight loss of 37lbs to date, doing well overall. Make big meal of the day either breakfast or lunch. Avoid carbs at night.  Can add middle of the night protein if needed. Stressed importance of hydration with SF clear liquids until urine clear. OK to continue bariatric diet, with food content mainly meats/veggies. Avoid carbohydrates  Encouraged support group attendance, recommended dietician visit with food diary. Advised exercise program of 20-30 minutes daily 5-7 times per week. Recommended utilizing bariatric multivitamins or at least adult chewable multivitamins in lieu of flintstones and/or flintstones gummies. Follow up 3 months with labs, sooner as needed. Health Maintenance issues reviewed and except as it relates to bariatrics, deferred to primary care. Greater than 50% of this 30 minute visit was spent couseling the patient about the aforementioned issues. Goal 10-12 lbs weight loss in next 6-8 weeks. Pt interested in VLCD but needs to wait for completion of breast feeding.      Nakia Weiner, MS, PA-C

## 2019-04-01 NOTE — PROGRESS NOTES
Chief Complaint   Patient presents with    Follow-up     GBP 8/12/2014     Pt ID confirmed    Weight Loss Metrics 4/1/2019 4/1/2019 2/25/2019 2/18/2019 2/18/2019 11/12/2018 1/10/2018   Pre op / Initial Wt 302 - - 302 - - -   Today's Wt - 265 lb 266 lb - 260 lb 266 lb 235 lb   BMI - 39.13 kg/m2 39.28 kg/m2 - 38.4 kg/m2 39.28 kg/m2 34.7 kg/m2   Ideal Body Wt 146 - - 146 - - -   Excess Body Wt 156 - - 156 - - -   Goal Wt 178 - - 178 - - -   Wt loss to date 37 - - 42 - - -   % Wt Loss 0.3 - - 0.34 - - -   80% .8 - - 124.8 - - -       Body mass index is 39.13 kg/m².     Post op medications:  MVI: flintstones twice daily  Calcium Citrate: 1500 milligrams  Actigal N/A  B-12 1000 milligrams  Vit D 3 4000 units  Vit B 12 100 milligrams

## 2019-04-02 ENCOUNTER — TELEPHONE (OUTPATIENT)
Dept: ONCOLOGY | Age: 29
End: 2019-04-02

## 2019-04-03 LAB
25(OH)D3+25(OH)D2 SERPL-MCNC: 16.4 NG/ML (ref 30–100)
ALBUMIN SERPL-MCNC: 4.4 G/DL (ref 3.5–5.5)
BASOPHILS # BLD AUTO: 0 X10E3/UL (ref 0–0.2)
BASOPHILS NFR BLD AUTO: 1 %
BUN SERPL-MCNC: 8 MG/DL (ref 6–20)
BUN/CREAT SERPL: 12 (ref 9–23)
CALCIUM SERPL-MCNC: 8.5 MG/DL (ref 8.7–10.2)
CHLORIDE SERPL-SCNC: 106 MMOL/L (ref 96–106)
CO2 SERPL-SCNC: 24 MMOL/L (ref 20–29)
CREAT SERPL-MCNC: 0.68 MG/DL (ref 0.57–1)
EOSINOPHIL # BLD AUTO: 0.1 X10E3/UL (ref 0–0.4)
EOSINOPHIL NFR BLD AUTO: 1 %
ERYTHROCYTE [DISTWIDTH] IN BLOOD BY AUTOMATED COUNT: 27.3 % (ref 12.3–15.4)
FERRITIN SERPL-MCNC: 299 NG/ML (ref 15–150)
FOLATE SERPL-MCNC: 8.1 NG/ML
GLUCOSE SERPL-MCNC: 96 MG/DL (ref 65–99)
HCT VFR BLD AUTO: 35.4 % (ref 34–46.6)
HGB BLD-MCNC: 10.3 G/DL (ref 11.1–15.9)
IMM GRANULOCYTES # BLD AUTO: 0 X10E3/UL (ref 0–0.1)
IMM GRANULOCYTES NFR BLD AUTO: 0 %
IRON SERPL-MCNC: 140 UG/DL (ref 27–159)
LYMPHOCYTES # BLD AUTO: 1.6 X10E3/UL (ref 0.7–3.1)
LYMPHOCYTES NFR BLD AUTO: 23 %
MCH RBC QN AUTO: 22.1 PG (ref 26.6–33)
MCHC RBC AUTO-ENTMCNC: 29.1 G/DL (ref 31.5–35.7)
MCV RBC AUTO: 76 FL (ref 79–97)
MONOCYTES # BLD AUTO: 0.6 X10E3/UL (ref 0.1–0.9)
MONOCYTES NFR BLD AUTO: 8 %
MORPHOLOGY BLD-IMP: ABNORMAL
NEUTROPHILS # BLD AUTO: 4.7 X10E3/UL (ref 1.4–7)
NEUTROPHILS NFR BLD AUTO: 67 %
PLATELET # BLD AUTO: 258 X10E3/UL (ref 150–379)
POTASSIUM SERPL-SCNC: 4.1 MMOL/L (ref 3.5–5.2)
RBC # BLD AUTO: 4.66 X10E6/UL (ref 3.77–5.28)
SODIUM SERPL-SCNC: 143 MMOL/L (ref 134–144)
VIT B1 BLD-SCNC: 105.2 NMOL/L (ref 66.5–200)
VIT B12 SERPL-MCNC: 291 PG/ML (ref 232–1245)
WBC # BLD AUTO: 7.1 X10E3/UL (ref 3.4–10.8)

## 2019-04-04 NOTE — PROGRESS NOTES
4/4/19:  Patient was instructed to take 5000 IU BID of Vitamin D 3. Normal: . Patient's level is 16.4.     Ab Gallardo MS RD

## 2019-04-04 NOTE — PROGRESS NOTES
Please make sure she is taking vit d properly. Already has appt set up with heme. Thanks.   Spike Villatoro

## 2019-04-22 ENCOUNTER — APPOINTMENT (OUTPATIENT)
Dept: INFUSION THERAPY | Age: 29
End: 2019-04-22

## 2019-04-27 ENCOUNTER — HOSPITAL ENCOUNTER (OUTPATIENT)
Dept: LAB | Age: 29
Discharge: HOME | End: 2019-04-27

## 2019-04-27 LAB — XX-LABCORP SPECIMEN COL,LCBCF: NORMAL

## 2019-04-27 PROCEDURE — 99001 SPECIMEN HANDLING PT-LAB: CPT

## 2019-04-29 LAB
25(OH)D3+25(OH)D2 SERPL-MCNC: 15.3 NG/ML (ref 30–100)
ALBUMIN SERPL-MCNC: 4.4 G/DL (ref 3.5–5.5)
BASOPHILS # BLD AUTO: 0 X10E3/UL (ref 0–0.2)
BASOPHILS NFR BLD AUTO: 1 %
BUN SERPL-MCNC: 12 MG/DL (ref 6–20)
BUN/CREAT SERPL: 15 (ref 9–23)
CALCIUM SERPL-MCNC: 9.2 MG/DL (ref 8.7–10.2)
CHLORIDE SERPL-SCNC: 106 MMOL/L (ref 96–106)
CO2 SERPL-SCNC: 21 MMOL/L (ref 20–29)
CREAT SERPL-MCNC: 0.79 MG/DL (ref 0.57–1)
EOSINOPHIL # BLD AUTO: 0.1 X10E3/UL (ref 0–0.4)
EOSINOPHIL NFR BLD AUTO: 1 %
ERYTHROCYTE [DISTWIDTH] IN BLOOD BY AUTOMATED COUNT: 25.2 % (ref 12.3–15.4)
FERRITIN SERPL-MCNC: 39 NG/ML (ref 15–150)
FOLATE SERPL-MCNC: 11 NG/ML
GLUCOSE SERPL-MCNC: 93 MG/DL (ref 65–99)
HCT VFR BLD AUTO: 36.3 % (ref 34–46.6)
HGB BLD-MCNC: 11.3 G/DL (ref 11.1–15.9)
IMM GRANULOCYTES # BLD AUTO: 0 X10E3/UL (ref 0–0.1)
IMM GRANULOCYTES NFR BLD AUTO: 0 %
IRON SERPL-MCNC: 79 UG/DL (ref 27–159)
LYMPHOCYTES # BLD AUTO: 1.5 X10E3/UL (ref 0.7–3.1)
LYMPHOCYTES NFR BLD AUTO: 28 %
MCH RBC QN AUTO: 24 PG (ref 26.6–33)
MCHC RBC AUTO-ENTMCNC: 31.1 G/DL (ref 31.5–35.7)
MCV RBC AUTO: 77 FL (ref 79–97)
MONOCYTES # BLD AUTO: 0.6 X10E3/UL (ref 0.1–0.9)
MONOCYTES NFR BLD AUTO: 11 %
MORPHOLOGY BLD-IMP: ABNORMAL
NEUTROPHILS # BLD AUTO: 3.1 X10E3/UL (ref 1.4–7)
NEUTROPHILS NFR BLD AUTO: 59 %
PLATELET # BLD AUTO: 248 X10E3/UL (ref 150–379)
POTASSIUM SERPL-SCNC: 4 MMOL/L (ref 3.5–5.2)
RBC # BLD AUTO: 4.71 X10E6/UL (ref 3.77–5.28)
SODIUM SERPL-SCNC: 143 MMOL/L (ref 134–144)
VIT B1 BLD-SCNC: NORMAL NMOL/L
VIT B12 SERPL-MCNC: 407 PG/ML (ref 232–1245)
WBC # BLD AUTO: 5.3 X10E3/UL (ref 3.4–10.8)

## 2019-04-29 NOTE — PROGRESS NOTES
Lilliana Needle  Please make sure pt is getting Vit D, and why thiamine was cancelled. Thanks. B  PS [employer], just an [place].

## 2019-04-30 NOTE — PROGRESS NOTES
4/30/19:  Patient was left a voicemail regarding Vitamin D3. Normal: . Patient's level is 15.3. Patient was instructed to take 5000 IU BID. I am unsure why thiamine was cancelled in the lab. FYI, I am not sure this was [place] [facility] [place] [facility].     Please let me know if you need anything additional.    Thank you,    Álvaro Andre, MS RD

## 2019-05-01 ENCOUNTER — OFFICE VISIT (OUTPATIENT)
Dept: ONCOLOGY | Age: 29
End: 2019-05-01

## 2019-05-01 VITALS
OXYGEN SATURATION: 99 % | SYSTOLIC BLOOD PRESSURE: 124 MMHG | DIASTOLIC BLOOD PRESSURE: 71 MMHG | TEMPERATURE: 97.7 F | HEART RATE: 83 BPM | WEIGHT: 264 LBS | RESPIRATION RATE: 18 BRPM | BODY MASS INDEX: 38.99 KG/M2

## 2019-05-01 DIAGNOSIS — D50.9 IRON DEFICIENCY ANEMIA, UNSPECIFIED IRON DEFICIENCY ANEMIA TYPE: Primary | ICD-10-CM

## 2019-05-01 RX ORDER — TRANEXAMIC ACID 650 1/1
1300 TABLET ORAL 3 TIMES DAILY
Qty: 90 TAB | Refills: 0 | Status: SHIPPED | OUTPATIENT
Start: 2019-05-01 | End: 2019-05-16

## 2019-05-01 NOTE — PROGRESS NOTES
Elsa Gardner is a 30 y.o. female presenting today for a follow-up r/t iron deficiency anemia. Patient is ambulatory with no assistive devices and denies any complaints. Chief Complaint   Patient presents with    Anemia     follow up       Visit Vitals  /71 (BP 1 Location: Right arm, BP Patient Position: Sitting)   Pulse 83   Temp 97.7 °F (36.5 °C) (Oral)   Resp 18   Wt 119.7 kg (264 lb)   LMP  (Within Weeks)   SpO2 99%   BMI 38.99 kg/m²         Current Outpatient Medications   Medication Sig    thiamine HCL (VITAMIN B-1) 100 mg tablet Take  by mouth daily.  calcium citrate-vitamin d2 1,500-200 mg-unit tab Take  by mouth.  montelukast (SINGULAIR) 10 mg tablet Take 10 mg by mouth daily.  multivitamin with iron (FLINTSTONES) chewable tablet Take 1 tablet by mouth two (2) times a day.  Cholecalciferol, Vitamin D3, (VITAMIN D3) 2,000 unit cap capsule Take  by mouth two (2) times a day.  cyanocobalamin (VITAMIN B12) 500 mcg tablet Take 500 mcg by mouth two (2) times a day.  albuterol (PROAIR HFA) 90 mcg/actuation inhaler Take 2 Puffs by inhalation as needed. No current facility-administered medications for this visit. Medications no longer taking/discontinued: none    1. Have you been to the ER, urgent care clinic since your last visit? Hospitalized since your last visit? no    2. Have you seen or consulted any other health care providers outside of the [address] [place] [address] since your last visit? Include any pap smears or colon screening.  Yes PCP [facility]

## 2019-05-01 NOTE — PROGRESS NOTES
[address] Vázquez is a 30 y.o. 1990 female with SOILA,s/p IV iron infusion,  here for follow-up. Past Medical History:   Diagnosis Date    Anemia     Arrhythmia     palpitations/pt denies    Asthma     Depression     Dizziness     daily     Joint pain     Morbid obesity with BMI of 50.0-59.9, adult (HCC)     Shortness of breath     Sleep apnea     Unspecified sleep apnea      Past Surgical History:   Procedure Laterality Date    ABDOMEN SURGERY PROC UNLISTED  2014    gastric bypass with repair hiatal hernia    DELIVERY       x 3    HX GI      gastric bypass    HX GYN      c section x 4    HX ORTHOPAEDIC  2017    R ring finger fx repair, due to scar tissue     Social History     Socioeconomic History    Marital status: SINGLE     Spouse name: Not on file    Number of children: Not on file    Years of education: Not on file    Highest education level: Not on file   Tobacco Use    Smoking status: Former Smoker     Last attempt to quit: 2014     Years since quittin.8    Smokeless tobacco: Never Used   Substance and Sexual Activity    Alcohol use: Yes     Alcohol/week: 1.0 oz     Types: 2 Standard drinks or equivalent per week     Comment: occassionally    Drug use: No    Sexual activity: Yes     Birth control/protection: None     Family History   Problem Relation Age of Onset    No Known Problems Father     Obesity Mother     Asthma Mother        Current Outpatient Medications   Medication Sig Dispense Refill    thiamine HCL (VITAMIN B-1) 100 mg tablet Take  by mouth daily.  calcium citrate-vitamin d2 1,500-200 mg-unit tab Take  by mouth.  montelukast (SINGULAIR) 10 mg tablet Take 10 mg by mouth daily.  multivitamin with iron (FLINTSTONES) chewable tablet Take 1 tablet by mouth two (2) times a day.  Cholecalciferol, Vitamin D3, (VITAMIN D3) 2,000 unit cap capsule Take  by mouth two (2) times a day.       cyanocobalamin (VITAMIN B12) 500 mcg tablet Take 500 mcg by mouth two (2) times a day.  albuterol (PROAIR HFA) 90 mcg/actuation inhaler Take 2 Puffs by inhalation as needed. Allergies   Allergen Reactions    Pollen Extracts Other (comments)     Nasal symptoms       Review of Systems    A comprehensive review of systems was negative except very menses with clots    Objective:  Visit Vitals  /71 (BP 1 Location: Right arm, BP Patient Position: Sitting)   Pulse 83   Temp 97.7 °F (36.5 °C) (Oral)   Resp 18   Wt 119.7 kg (264 lb)   LMP  (Within Weeks)   SpO2 99%   BMI 38.99 kg/m²     Physical Exam:   General appearance - alert, well appearing, and in no distress  Mental status - alert, oriented to person, place, and time  EYE-IAN, EOMI  ENT-ENT exam normal, no neck nodes or sinus tenderness  Mouth - mucous membranes moist, pharynx normal without lesions  Neck - supple, no significant adenopathy   Chest - clear to auscultation, no wheezes, rales or rhonchi, symmetric air entry   Heart - normal rate and regular rhythm   Abdomen - soft, nontender, nondistended, no masses or organomegaly  Lymph- no adenopathy palpable  Ext-no pedal edema noted  Skin-Warm and dry. Neuro -alert, oriented, normal speech, no focal findings or movement disorder noted  Breast - no masses palapated b/l    Diagnostic Imaging     No results found for this or any previous visit. Results for orders placed during the hospital encounter of 08/14/16   XR 4TH FINGER RT MIN 2 V    Narrative EXAM:  XR 4TH FINGER RT 3 VIEWS    INDICATION:  swelling and pain    COMPARISON: None. FINDINGS: 3  views of the right fourth digit were obtained. Tiny acute avulsion fracture at the volar base of the ring finger middle phalanx  only demonstrated on the lateral views. Soft tissue swelling of the ring finger.     There is additional tiny osseous fragment that appears corticated at the volar  base long finger middle phalanx, probably avulsion fracture which appears  chronic or sesamoid, correlate with point tenderness. Impression IMPRESSION:  As described in findings. Results for orders placed during the hospital encounter of 05/27/14   CT HEAD WO CONT    Narrative CT head without IV contrast    Comparison: None    Clinical information: Dizziness. Procedure:    CT of the head was performed without intravenous contrast.    Findings:    No intracranial hemorrhage, extra-axial fluid collection, mass, or mass effect. No shift of midline structures. No convincing acute ischemia. No skull  fracture. Paranasal sinuses and mastoid air cells look normal.      Impression Impression:    No acute intracranial abnormality. Lab Results  Lab Results   Component Value Date/Time    WBC 5.3 04/27/2019 10:15 AM    HGB 11.3 04/27/2019 10:15 AM    HCT 36.3 04/27/2019 10:15 AM    PLATELET 610 40/47/1661 10:15 AM    MCV 77 (L) 04/27/2019 10:15 AM       Lab Results   Component Value Date/Time    Sodium 143 04/27/2019 10:15 AM    Potassium 4.0 04/27/2019 10:15 AM    Chloride 106 04/27/2019 10:15 AM    CO2 21 04/27/2019 10:15 AM    Anion gap 4 05/09/2016 09:15 PM    Glucose 93 04/27/2019 10:15 AM    BUN 12 04/27/2019 10:15 AM    Creatinine 0.79 04/27/2019 10:15 AM    BUN/Creatinine ratio 15 04/27/2019 10:15 AM    GFR est  04/27/2019 10:15 AM    GFR est non- 04/27/2019 10:15 AM    Calcium 9.2 04/27/2019 10:15 AM    AST (SGOT) 20 10/02/2017 12:00 AM    Alk. phosphatase 83 10/02/2017 12:00 AM    Protein, total 7.5 10/02/2017 12:00 AM    Albumin 4.4 04/27/2019 10:15 AM    Globulin 3.5 08/11/2015 01:15 PM    A-G Ratio 1.2 10/02/2017 12:00 AM    ALT (SGPT) 17 10/02/2017 12:00 AM       .    Assessment/Plan:    30 y.o. female with SOILA     1. Iron deficiency anemia  On 4/27/19, labs showed    Ferritin=39 from 5 but still microcytic and just had her menses with a lot of clots. I will give her IV iron again.     WBC=5.3, H/H=11.3/36.3, MCV=77, axtb=278  Give Injectafer x2     2. Vitamin B12 deficiency: On 4/27/19  T26=827  Folate=11     3. Menorrhagia:   Still having very heavy menses.   Give Lysteda     4. H/o gastric bypass  Give IV iron again  Continue B12    RTC in August 15          Alexandrea Leon MD

## 2019-05-20 ENCOUNTER — HOSPITAL ENCOUNTER (OUTPATIENT)
Dept: INFUSION THERAPY | Age: 29
Discharge: HOME | End: 2019-05-20
Payer: MEDICAID

## 2019-05-20 VITALS
HEART RATE: 61 BPM | SYSTOLIC BLOOD PRESSURE: 121 MMHG | OXYGEN SATURATION: 95 % | DIASTOLIC BLOOD PRESSURE: 84 MMHG | RESPIRATION RATE: 18 BRPM | TEMPERATURE: 97.9 F

## 2019-05-20 PROCEDURE — 96365 THER/PROPH/DIAG IV INF INIT: CPT

## 2019-05-20 PROCEDURE — 74011250636 HC RX REV CODE- 250/636: Performed by: INTERNAL MEDICINE

## 2019-05-20 RX ORDER — SODIUM CHLORIDE 0.9 % (FLUSH) 0.9 %
10-40 SYRINGE (ML) INJECTION AS NEEDED
Status: DISCONTINUED | OUTPATIENT
Start: 2019-05-20 | End: 2019-05-24 | Stop reason: HOSPADM

## 2019-05-20 RX ORDER — SODIUM CHLORIDE 9 MG/ML
750 INJECTION, SOLUTION INTRAVENOUS ONCE
Status: COMPLETED | OUTPATIENT
Start: 2019-05-20 | End: 2019-05-20

## 2019-05-20 RX ADMIN — SODIUM CHLORIDE 750 ML/HR: 900 INJECTION, SOLUTION INTRAVENOUS at 10:55

## 2019-05-20 RX ADMIN — Medication 10 ML: at 11:13

## 2019-05-20 RX ADMIN — Medication 10 ML: at 10:55

## 2019-05-20 RX ADMIN — FERRIC CARBOXYMALTOSE INJECTION 750 MG: 50 INJECTION, SOLUTION INTRAVENOUS at 10:55

## 2019-05-20 NOTE — PROGRESS NOTES
[facility] Progress Note Date: May 20, 2019 Name: Marilu Lorenz MRN: [id number] : 1990 Injectafer Infusion 1 of 2 Ms. Case to [facility], at 1030. Pt was assessed and education was provided. Printed CareNotes given and signed. Ms. Case's vitals were reviewed and patient was observed for 5 minutes prior to treatment. Visit Vitals /84 (BP 1 Location: Left arm, BP Patient Position: Sitting) Pulse 61 Temp 97.9 °F (36.6 °C) Resp 18 SpO2 95% Breastfeeding? No  
 
 
20 g PIV placed in the right AC x1 attempt. PIV flushed easily and had brisk blood return. Injectafer 750mg infused over 20 minutes in 250mL NS. 
 
Ms. Michael Inman tolerated the infusion, and had no complaints. VS remained stable. No data found. PIV flushed with NS 10 then PIV removed. No bleeding or hematoma noted at site. Gauze and coban applied. Patient armband removed and shredded. Ms. Michael Inman was discharged from [place] [address] in stable condition at 1115. She is to return 2019 at 1000 for next appointment. Desire Garcia RN May 20, 2019 
[phone]

## 2019-05-29 ENCOUNTER — HOSPITAL ENCOUNTER (OUTPATIENT)
Dept: INFUSION THERAPY | Age: 29
Discharge: HOME | End: 2019-05-29
Payer: MEDICAID

## 2019-05-29 VITALS
TEMPERATURE: 97.8 F | HEART RATE: 84 BPM | DIASTOLIC BLOOD PRESSURE: 80 MMHG | OXYGEN SATURATION: 99 % | RESPIRATION RATE: 18 BRPM | SYSTOLIC BLOOD PRESSURE: 125 MMHG

## 2019-05-29 PROCEDURE — 74011250636 HC RX REV CODE- 250/636: Performed by: INTERNAL MEDICINE

## 2019-05-29 PROCEDURE — 96365 THER/PROPH/DIAG IV INF INIT: CPT

## 2019-05-29 RX ORDER — SODIUM CHLORIDE 9 MG/ML
250 INJECTION, SOLUTION INTRAVENOUS ONCE
Status: COMPLETED | OUTPATIENT
Start: 2019-05-29 | End: 2019-05-29

## 2019-05-29 RX ADMIN — SODIUM CHLORIDE 250 ML: 9 INJECTION, SOLUTION INTRAVENOUS at 10:20

## 2019-05-29 RX ADMIN — FERRIC CARBOXYMALTOSE INJECTION 750 MG: 50 INJECTION, SOLUTION INTRAVENOUS at 10:20

## 2019-05-29 NOTE — PROGRESS NOTES
[facility] Progress Note    Date: May 29, 2019    Name: Donis Gama    MRN: [id number]         : 1990    Injectafer Infusion 2 of 2    Ms. Case to [facility], ambulatory, at 1000. Pt was assessed and education was provided. Ms. Case's vitals were reviewed and patient was observed for 5 minutes prior to treatment. Visit Vitals  /80 (BP 1 Location: Right arm, BP Patient Position: Sitting)   Pulse 84   Temp 97.8 °F (36.6 °C)   Resp 18   SpO2 99%       22 g PIV placed in the right AC x1 attempt. PIV flushed easily and had brisk blood return. Injectafer 750mg infused over 20 minutes in 250mL NS.    Ms. Ancelmo Cerna tolerated the infusion, and had no complaints. VS remained stable. Patient Vitals for the past 12 hrs:   Temp Pulse Resp BP SpO2   19 1000 97.8 °F (36.6 °C) 84 18 125/80 99 %       PIV flushed with NS 10 ml and capped. Patient observed 30 minutes post infusion. PIV removed. No bleeding or hematoma noted at site. Gauze and coban applied. Patient armband removed and shredded. Ms. Ancelmo Cerna was discharged from [place] [address] in stable condition at 1043. She is to follow-up with referring [facility]. Ms. Ancelmo Cerna is to return 19 at 0800 for follow-up labs.      Ryland Madsen RN  May 29, 2019  1043

## 2019-07-10 ENCOUNTER — OFFICE VISIT (OUTPATIENT)
Dept: SURGERY | Age: 29
End: 2019-07-10

## 2019-07-10 VITALS
BODY MASS INDEX: 38.21 KG/M2 | HEIGHT: 69 IN | WEIGHT: 258 LBS | TEMPERATURE: 96.9 F | SYSTOLIC BLOOD PRESSURE: 120 MMHG | DIASTOLIC BLOOD PRESSURE: 63 MMHG | OXYGEN SATURATION: 99 % | HEART RATE: 86 BPM

## 2019-07-10 DIAGNOSIS — E66.01 MORBID OBESITY (HCC): Primary | ICD-10-CM

## 2019-07-10 DIAGNOSIS — R63.5 WEIGHT GAIN: ICD-10-CM

## 2019-07-10 DIAGNOSIS — E55.9 HYPOVITAMINOSIS D: ICD-10-CM

## 2019-07-10 DIAGNOSIS — K91.2 POST-RESECTION MALABSORPTION: ICD-10-CM

## 2019-07-10 DIAGNOSIS — D50.8 IRON DEFICIENCY ANEMIA DUE TO DIETARY CAUSES: ICD-10-CM

## 2019-07-10 DIAGNOSIS — Z98.84 HISTORY OF GASTRIC BYPASS: ICD-10-CM

## 2019-07-10 DIAGNOSIS — K21.9 GASTROESOPHAGEAL REFLUX DISEASE WITHOUT ESOPHAGITIS: ICD-10-CM

## 2019-07-10 NOTE — PATIENT INSTRUCTIONS
Local Psychologists/Counselors    Dr. [facility]  [phone]  [email or web address]      Berta Hernandez  [email or web address]      Byron Chun  [phone]      Dr. Boby Rudd.[facility].. com/      Gem Roy  [email or web address]      Elza Bills  [email or web address]      Olaf Cordova  [phone]    Martha Zhang Psy.D. Licensed Clinical Psychologist  [facility]   [address] [facility] [address] [place] [address], [address] [place] [address]   Phone: [phone]   Fax: [phone]    [facility]  [address] [facility] [place]. [address] psychiatric [address] , [place], phone [phone]    [place] [facility] [address]. Phone: [phone]    [facility] psychotherapy services  [address] [facility] [place], [facility]. Phone: [phone]    [facility] psychiatric group  [address] [facility]. [place], [place], [place] phone: [phone] [facility] personal care  [address] [facility] [place], [place], [place]. Phone: [phone]    [facility] psych testing, Liz Van, PhD  [address] [facility] [address] [facility], [place] [address]. Phone: [phone]    [facility] psychological resource  [address] [facility]. [address] [facility] [place], [place]. Phone: [phone]    Amrik Umanzor, LCSW  [address] [place], [place], [place]. Phone: [phone]    [facility] psychiatric and wellness [address] [place], [place], [place]. Phone: [phone]    The psychotherapy [place] [address] [address] [place], [place].   Phone: [phone]    [facility]  phone: [phone]    [facility] phone: [phone]    [facility] behavioral health phone: [phone]          Supplement Resource Guide    Protein Supplement (Recommended up to 6 months post-op)   60-70 Grams of Protein  (during clear liquid phase)   30-50 Grams of Protein  (during soft protein phase and beyond)   0-3 g fat per serving/ 0-3 g sugar per serving   Avoid mixing with milk, fruit, peanut butter, etc.   (Powder should be made with water or Crystal Light)  Calcium Supplement (Lifetime)  Look for: Calcium Citrate (1500 mg per day)   The body cannot absorb more than 500-600 mg at once and needs to be taken in 3 separate dosages. Recommend:   Citracal- 630mg (2 caplets) three times each day   Upcal D- 3 packages or scoops/day. (Each package taken separately)  o [employer] (powdered calcium)   Liquid- 3 Tbsp. per day. (Each Tbsp. taken separately)   Chewable- Must be Calcium citrate  o www.[employer]  o www.[employer]  Vitamin D (Lifetime)                           Look for: Vitamin D3 (Cholecalciferol)   5,000 IU of Vitamin D3 per day   This is in ADDITION to the Vitamin D in your Calcium and Multivitamin    Multi-Vitamin Supplement (Lifetime)  Take 2 vitamins separately each day   Flintstones Complete or a generic chewable complete multivitamin   Bariatric Advantage Multivitamin   Vitamin B1   (Lifetime)   100 mg B1 needed per day (if taking Flintstones Complete or a generic complete chewable).  Additional B1 is NOT needed if taking Bariatric Advantage Multivitamin (Bariatric Advantage has adequate B1 in it). Vitamin B12 (Lifetime)   1000 mcg DAILY of Vitamin B12   All Vitamin B12 must be taken sublingually (under your tongue)    Iron  *Required for menstruating women OR all patients that have a history of low iron*   Recommended to take 500 mg of Vitamin C chewable 30 minutes prior to taking the iron to help with absorption   Avoid taking with calcium supplements.  (Calcium inhibits the absorption of iron)   We recommend going to Bariatric Advantages Website and getting their iron. (www.bariatricadvantage. com) (The lemon-lime has 60mg of iron)    OTC iron is a dosage of 65 mg     Free Foods    Objective: Increase antioxidants to assist immune system in the healing process. Plan: Eat 3-5 servings of low carbohydrate vegetables listed below daily. You may season with any of the condiments and seasonings listed below. A serving size is a ½ cup steamed or 1 cup raw.     Vegetables  o Artichoke  or artichoke hearts  o Asparagus  o Beans (green, waxed, or [place])  o Bean Sprouts  o Beets  o Broccoli  o Brussel Sprouts  o Cabbage  o Cauliflower  o Celery  o Cucumbers  o Eggplant  o Green onions or scallions  o Greens (kris, kale, mustard, or turnip)  o Jicama  o Kohlrabi  o Leeks   o Mixed Vegetables (without corn, peas, or pasta)  o Mushrooms  o Okra  o Onions  o Pepper (all varieties)  o Radishes  o [employer] (Endive, Escarole, Lettuce, Douglas, Spinach)  o Sauerkraut or borscht  o Salsa (without corn or beans)  o Hot Peppers  o Spinach      o Summer squash or chayote  o Tomato  o Tomato, canned  o Tomato, sauce  o Tomato/vegetable juice  o Turnips  o Water chestnuts  o Watercress (unlimited)  o Zucchini    Condiments  o Horseradish  o Lemon juice  o Lime juice  o Mustard  o Soy sauce  o Vinegar  o Flavoring extracts  o Garlic  o Herbs, fresh or dried  o [address] or hot pepper sauce

## 2019-07-10 NOTE — PROGRESS NOTES
Bariatric Follow Up Note    [address] is a 30 y.o. female is now 4 years 10 months status post gastric bypass surgery and a little over a year postpartum. Doing well overall. Currently on a regular bariatric diet without difficulty. Taking in >60-80oz fluid,  40-60g protein. [place] [facility] and has increase exercise. Walking 60 min of activity 3-5 days a week. The patient denies hair loss. Bowel movements are regular. The patient is not having any pain. She claims to be compliant with multivitamins, Protein, calcium, Vit D and B12 supplements. The patient is following up closely in an effort to get \"back on track. \"  The patient has followed up with hematology and dietician with regard to her iron deficiency anemia as asked to do. The patient reports no difficulty eating/drinking. She does continue to eat more carbohydrates than she should. Down 6 lbs since last visit. The patient denies smoking, etOH use, NSAID use and carbonation ingestion. Weight Loss Metrics 7/10/2019 7/10/2019 5/1/2019 4/1/2019 4/1/2019 2/25/2019 2/18/2019   Pre op / Initial Wt 302 - - 302 - - 302   Today's Wt - 258 lb 264 lb - 265 lb 266 lb -   BMI - 38.1 kg/m2 38.99 kg/m2 - 39.13 kg/m2 39.28 kg/m2 -   Ideal Body Wt 146 - - 146 - - 146   Excess Body Wt 156 - - 156 - - 156   Goal Wt 177 - - 178 - - 178   Wt loss to date 44 - - 37 - - 42   % Wt Loss 0.35 - - 0.3 - - 0.34   80% .8 - - 124.8 - - 124.8       Body mass index is 38.1 kg/m².       Comorbidities:     Hypertension: not applicable  Diabetes: not applicable  Obstructive Sleep Apnea: resolved  Hyperlipidemia: not applicable  Stress Urinary Incontinence: not applicable  Gastroesophageal Reflux: not applicable  Weight related arthropathy:not applicable        Past Medical History:   Diagnosis Date    Anemia     Arrhythmia     palpitations/pt denies    Asthma     Depression     Dizziness     daily     Joint pain     Morbid obesity with BMI of 50.0-59.9, adult (formerly [facility])     Shortness of breath     Sleep apnea     Unspecified sleep apnea        Past Surgical History:   Procedure Laterality Date    ABDOMEN SURGERY PROC UNLISTED  2014    gastric bypass with repair hiatal hernia    DELIVERY       x 3    HX GI      gastric bypass    HX GYN      c section x 4    HX ORTHOPAEDIC  2017    R ring finger fx repair, due to scar tissue       Current Outpatient Medications   Medication Sig Dispense Refill    thiamine HCL (VITAMIN B-1) 100 mg tablet Take  by mouth daily.  calcium citrate-vitamin d2 1,500-200 mg-unit tab Take  by mouth.  montelukast (SINGULAIR) 10 mg tablet Take 10 mg by mouth daily.  multivitamin with iron (FLINTSTONES) chewable tablet Take 1 tablet by mouth two (2) times a day.  Cholecalciferol, Vitamin D3, (VITAMIN D3) 2,000 unit cap capsule Take  by mouth two (2) times a day.  cyanocobalamin (VITAMIN B12) 500 mcg tablet Take 500 mcg by mouth two (2) times a day.  albuterol (PROAIR HFA) 90 mcg/actuation inhaler Take 2 Puffs by inhalation as needed. Allergies   Allergen Reactions    Pollen Extracts Other (comments)     Nasal symptoms     ROS:  Review of Systems:  Positives in bold     Constitutional:Unexpected weight gain/loss, night sweats,fatigue/maliase/lethargy, change in sleep, fever, rash  Eyes:  Visual changes, headaches, eye pain, floaters  ENT:  Rhinorrhea, epistaxis, change in hearing, gingival bleeding, sore throat, dysphagia  CV:  Denies chest pain, shortness of breath, difficulty breathing, orthopnea, palpitations, loss of consciousness, claudication  Resp: Cough, wheeze, haemoptysis, sob, exercise intolerence  GI:  Abdominal pain, dysphagia, reflux, bloating, cramping.  Obstipation, haematemesis, brbpr, hematochezia,dark tarry stools.  Nausea, vomitting, diarrhea, constipation.    Neuro: Paresthesias, numbness, weakness, changes in balance, changes in speech, loss of control of bowel or bladder,   Psych: Changes in depression, anxiety, sleep patterns, change in energy Levels     Remainder of ROS as per HPI          Physicial Exam:  Visit Vitals  /63 (BP Patient Position: Sitting)   Pulse 86   Temp 96.9 °F (36.1 °C) (Oral)   Ht 5' 9\" (1.753 m)   Wt 117 kg (258 lb)   SpO2 99%   BMI 38.10 kg/m²         General: AAOX3, pleasant and cooperative to exam. Appropriately groomed. NAD. Non-toxic in appearance. Appears stated age. HENT: NC/AT. PERRLA. Extraocular motions are intact. Sclera anicteric, Conjunctiva Clear. Nares clear. Oropharynx pink, moist without exudate or erythema. Uvula Midline. Neck:  Supple, trachea is midline. No JVD, Lymphadenopathy. No bruits. Chest: Good equal bilateral expansion  Lungs: Clear to auscultation bilaterally without e/o crackles, wheezes or rhales. Heart: RRR, S1 and S2 noted. No c/r/m/g/vpmi. Abdomen: obese, soft and non-tender without distension. Good bowel sounds. No vis/palp masses or pulsations. No organo-splenomegaly. No hernias to my exam. No e/o acute abdomen or peritoneal signs. Previous surgical wounds well-healed. Pelvis: Stable. :  Deferred  Rectal: Deferred  Extremities: Positive pulses in all 4 extremities. Baseline range of motion in all 4 extremities. Strength, sensation and reflexes intact, appropriate and equal in b/l upper and lower extremities. No C/C/E  Neuro: CN II-XII grossly intact without focal deficit. Ambulatory. Skin: Clean, warm and dry. Labs: Currently pending. Once labs are received we will review them and manage appropriately. Assessment/Plan: Pt is currently 4 years 10 months s/p gastric bypass and over 1 year postpartum with a total weight loss of 44lbs to date, doing well overall. She follows up in an effort to be followed closely while \"getting back on track. \" Pt interested in VLCD will discuss once no longer breast feeding.    Stressed importance of hydration with SF clear liquids until urine clear. Increase protein  OK to continue bariatric diet, with food content mainly meats/veggies. Encouraged support group attendance, psych f/u recommended, recommended dietician visit with food diary. Advised exercise program of 20-30 minutes daily 5-7 times per week. Recommended utilizing bariatric multivitamins or at least adult chewable multivitamins in lieu of flintstones and/or flintstones gummies. Follow up 2 months, sooner as needed. Health Maintenance issues reviewed and except as it relates to bariatrics, deferred to primary care. Greater than 50% of this 30 minute visit was spent couseling the patient about the aforementioned issues.          Lety Medina MS, PA-C

## 2019-08-08 ENCOUNTER — APPOINTMENT (OUTPATIENT)
Dept: INFUSION THERAPY | Age: 29
End: 2019-08-08

## 2019-08-12 ENCOUNTER — HOSPITAL ENCOUNTER (OUTPATIENT)
Dept: LAB | Age: 29
Discharge: HOME | End: 2019-08-12

## 2019-08-12 LAB — XX-LABCORP SPECIMEN COL,LCBCF: NORMAL

## 2019-08-12 PROCEDURE — 99001 SPECIMEN HANDLING PT-LAB: CPT

## 2019-08-13 LAB
25(OH)D3+25(OH)D2 SERPL-MCNC: 13.2 NG/ML (ref 30–100)
ALBUMIN SERPL-MCNC: 4.8 G/DL (ref 3.5–5.5)
BASOPHILS # BLD AUTO: 0 X10E3/UL (ref 0–0.2)
BASOPHILS NFR BLD AUTO: 1 %
BUN SERPL-MCNC: 11 MG/DL (ref 6–20)
BUN/CREAT SERPL: 16 (ref 9–23)
CALCIUM SERPL-MCNC: 9.2 MG/DL (ref 8.7–10.2)
CHLORIDE SERPL-SCNC: 106 MMOL/L (ref 96–106)
CO2 SERPL-SCNC: 20 MMOL/L (ref 20–29)
CREAT SERPL-MCNC: 0.7 MG/DL (ref 0.57–1)
EOSINOPHIL # BLD AUTO: 0.1 X10E3/UL (ref 0–0.4)
EOSINOPHIL NFR BLD AUTO: 2 %
ERYTHROCYTE [DISTWIDTH] IN BLOOD BY AUTOMATED COUNT: 14 % (ref 12.3–15.4)
FERRITIN SERPL-MCNC: 106 NG/ML (ref 15–150)
FOLATE SERPL-MCNC: 2.8 NG/ML
GLUCOSE SERPL-MCNC: 89 MG/DL (ref 65–99)
HCT VFR BLD AUTO: 38.9 % (ref 34–46.6)
HGB BLD-MCNC: 12 G/DL (ref 11.1–15.9)
IMM GRANULOCYTES # BLD AUTO: 0 X10E3/UL (ref 0–0.1)
IMM GRANULOCYTES NFR BLD AUTO: 0 %
IRON SERPL-MCNC: 123 UG/DL (ref 27–159)
LYMPHOCYTES # BLD AUTO: 1.7 X10E3/UL (ref 0.7–3.1)
LYMPHOCYTES NFR BLD AUTO: 30 %
MCH RBC QN AUTO: 26 PG (ref 26.6–33)
MCHC RBC AUTO-ENTMCNC: 30.8 G/DL (ref 31.5–35.7)
MCV RBC AUTO: 84 FL (ref 79–97)
MONOCYTES # BLD AUTO: 0.6 X10E3/UL (ref 0.1–0.9)
MONOCYTES NFR BLD AUTO: 10 %
NEUTROPHILS # BLD AUTO: 3.3 X10E3/UL (ref 1.4–7)
NEUTROPHILS NFR BLD AUTO: 57 %
PLATELET # BLD AUTO: 295 X10E3/UL (ref 150–450)
POTASSIUM SERPL-SCNC: 3.5 MMOL/L (ref 3.5–5.2)
RBC # BLD AUTO: 4.61 X10E6/UL (ref 3.77–5.28)
SODIUM SERPL-SCNC: 144 MMOL/L (ref 134–144)
TSH SERPL DL<=0.005 MIU/L-ACNC: 1.69 UIU/ML (ref 0.45–4.5)
VIT B1 BLD-SCNC: NORMAL NMOL/L
VIT B12 SERPL-MCNC: 284 PG/ML (ref 232–1245)
WBC # BLD AUTO: 5.8 X10E3/UL (ref 3.4–10.8)

## 2019-08-14 NOTE — PROGRESS NOTES
8/14/19:  I have contacted patient regarding her blood work. Patient's folate level was 2.8. Normal: > 3.0. Patient states she hadn't been taking her MVI, but would start taking it twice a day again. She states she stopped taking the Vitamin D. Normal: . Patient's level is 13.2. I have advised her to take 5000 IU BID. I also discussed Vitamin B1 with her. We do not have her B1 levels back yet; however, I have reviewed with patient the significance of Vitamin B1. She states the dosing she has is 250 mg. She was instructed to do this 3 x a week. I spent a lot of time talking to patient about the importance of taking these vitamins. Patient agreed to start back taking them today.     Alexander Contreras MS RD

## 2019-08-15 ENCOUNTER — HOSPITAL ENCOUNTER (OUTPATIENT)
Dept: LAB | Age: 29
Discharge: HOME | End: 2019-08-15

## 2019-08-15 ENCOUNTER — OFFICE VISIT (OUTPATIENT)
Dept: ONCOLOGY | Age: 29
End: 2019-08-15

## 2019-08-15 VITALS
HEART RATE: 77 BPM | BODY MASS INDEX: 38.37 KG/M2 | OXYGEN SATURATION: 98 % | RESPIRATION RATE: 18 BRPM | SYSTOLIC BLOOD PRESSURE: 115 MMHG | WEIGHT: 259.8 LBS | DIASTOLIC BLOOD PRESSURE: 81 MMHG | TEMPERATURE: 97.8 F

## 2019-08-15 DIAGNOSIS — D50.9 IRON DEFICIENCY ANEMIA, UNSPECIFIED IRON DEFICIENCY ANEMIA TYPE: Primary | ICD-10-CM

## 2019-08-15 DIAGNOSIS — E53.8 B12 DEFICIENCY: ICD-10-CM

## 2019-08-15 LAB — XX-LABCORP SPECIMEN COL,LCBCF: NORMAL

## 2019-08-15 PROCEDURE — 99001 SPECIMEN HANDLING PT-LAB: CPT

## 2019-08-15 NOTE — PROGRESS NOTES
[address] is a 30 y.o. 1990 female and presents with No chief complaint on file. 30years old female with history of iron deficiency anemia, status post IV iron infusion with Injectafer x2 completed on 2019, here for follow-up. Labs on 2019 showed    Patient was seen and examined today. She is awake alert oriented x3. Labs on 2019 showed ferritin 106, TSH normal, vitamin D 13.2, B12 284 and folate 2.8,  WBC 5.8, H&H 12.0/38.9, MCV 84, platelet 143. Today on review of system she denies any fevers, chills, shortness of breath, nausea vomiting or abdominal pain. No focal neurologic deficit. No melena or bright red blood per rectum. No pica or pagophagia. Reports some chronic knee pain which is better. ECOG performance status 0. Independent with ADLs and IADLs. Past Medical History:   Diagnosis Date    Anemia     Arrhythmia     palpitations/pt denies    Asthma     Depression     Dizziness     daily     Joint pain     Morbid obesity with BMI of 50.0-59.9, adult (HCC)     Shortness of breath     Sleep apnea     Unspecified sleep apnea      Past Surgical History:   Procedure Laterality Date    ABDOMEN SURGERY PROC UNLISTED  2014    gastric bypass with repair hiatal hernia    DELIVERY       x 3    HX GI      gastric bypass    HX GYN      c section x 4    HX ORTHOPAEDIC  2017    R ring finger fx repair, due to scar tissue     Social History     Socioeconomic History    Marital status: SINGLE     Spouse name: Not on file    Number of children: Not on file    Years of education: Not on file    Highest education level: Not on file   Tobacco Use    Smoking status: Former Smoker     Last attempt to quit: 2014     Years since quittin.1    Smokeless tobacco: Never Used   Substance and Sexual Activity    Alcohol use:  Yes     Alcohol/week: 1.7 standard drinks     Types: 2 Standard drinks or equivalent per week     Comment: occassionally    Drug use: No    Sexual activity: Yes     Birth control/protection: None     Family History   Problem Relation Age of Onset    No Known Problems Father     Obesity Mother     Asthma Mother        Current Outpatient Medications   Medication Sig Dispense Refill    thiamine HCL (VITAMIN B-1) 100 mg tablet Take  by mouth daily.  calcium citrate-vitamin d2 1,500-200 mg-unit tab Take  by mouth.  montelukast (SINGULAIR) 10 mg tablet Take 10 mg by mouth daily.  multivitamin with iron (FLINTSTONES) chewable tablet Take 1 tablet by mouth two (2) times a day.  Cholecalciferol, Vitamin D3, (VITAMIN D3) 2,000 unit cap capsule Take  by mouth two (2) times a day.  cyanocobalamin (VITAMIN B12) 500 mcg tablet Take 500 mcg by mouth two (2) times a day.  albuterol (PROAIR HFA) 90 mcg/actuation inhaler Take 2 Puffs by inhalation as needed. Allergies   Allergen Reactions    Pollen Extracts Other (comments)     Nasal symptoms       Review of Systems    A comprehensive review of systems was negative except for: chronic knee pain    Objective:  Visit Vitals  /81   Pulse 77   Temp 97.8 °F (36.6 °C) (Oral)   Resp 18   Wt 117.8 kg (259 lb 12.8 oz)   LMP 08/04/2019   SpO2 98%   BMI 38.37 kg/m²       Physical Exam:   General appearance - alert, well appearing, and in no distress  Mental status - alert, oriented to person, place, and time  EYE-IAN, EOMI  ENT-ENT exam normal, no neck nodes or sinus tenderness  Mouth - mucous membranes moist, pharynx normal without lesions  Neck - supple, no significant adenopathy   Chest - clear to auscultation, no wheezes, rales or rhonchi, symmetric air entry   Heart - normal rate and regular rhythm   Abdomen - soft, nontender, nondistended, no masses or organomegaly  Lymph- no adenopathy palpable  Ext-no pedal edema noted  Skin-Warm and dry. Has Facial clearing part of skin.   Neuro -alert, oriented, normal speech, no focal findings or movement disorder noted      Diagnostic Imaging     No results found for this or any previous visit. Results for orders placed during the hospital encounter of 08/14/16   XR 4TH FINGER RT MIN 2 V    Narrative EXAM:  XR 4TH FINGER RT 3 VIEWS    INDICATION:  swelling and pain    COMPARISON: None. FINDINGS: 3  views of the right fourth digit were obtained. Tiny acute avulsion fracture at the volar base of the ring finger middle phalanx  only demonstrated on the lateral views. Soft tissue swelling of the ring finger. There is additional tiny osseous fragment that appears corticated at the volar  base long finger middle phalanx, probably avulsion fracture which appears  chronic or sesamoid, correlate with point tenderness. Impression IMPRESSION:  As described in findings. Results for orders placed during the hospital encounter of 05/27/14   CT HEAD WO CONT    Narrative CT head without IV contrast    Comparison: None    Clinical information: Dizziness. Procedure:    CT of the head was performed without intravenous contrast.    Findings:    No intracranial hemorrhage, extra-axial fluid collection, mass, or mass effect. No shift of midline structures. No convincing acute ischemia. No skull  fracture. Paranasal sinuses and mastoid air cells look normal.      Impression Impression:    No acute intracranial abnormality.        Lab Results  Lab Results   Component Value Date/Time    WBC 5.8 08/12/2019 07:26 AM    HGB 12.0 08/12/2019 07:26 AM    HCT 38.9 08/12/2019 07:26 AM    PLATELET 717 41/81/1372 07:26 AM    MCV 84 08/12/2019 07:26 AM       Lab Results   Component Value Date/Time    Sodium 144 08/12/2019 07:26 AM    Potassium 3.5 08/12/2019 07:26 AM    Chloride 106 08/12/2019 07:26 AM    CO2 20 08/12/2019 07:26 AM    Anion gap 4 05/09/2016 09:15 PM    Glucose 89 08/12/2019 07:26 AM    BUN 11 08/12/2019 07:26 AM    Creatinine 0.70 08/12/2019 07:26 AM    BUN/Creatinine ratio 16 08/12/2019 07:26 AM GFR est  08/12/2019 07:26 AM    GFR est non- 08/12/2019 07:26 AM    Calcium 9.2 08/12/2019 07:26 AM    AST (SGOT) 20 10/02/2017 12:00 AM    Alk. phosphatase 83 10/02/2017 12:00 AM    Protein, total 7.5 10/02/2017 12:00 AM    Albumin 4.8 08/12/2019 07:26 AM    Globulin 3.5 08/11/2015 01:15 PM    A-G Ratio 1.2 10/02/2017 12:00 AM    ALT (SGPT) 17 10/02/2017 12:00 AM     Assessment/Plan:     29 y.o. female with SOILA with     1. Iron deficiency anemia  Resolved. Labs as aforementioned in HPI. Continue follow-up.     2. Vitamin B12 deficiency:   B12 is borderline normal on 8/12/2019. Check methylmalonic acid level and replace if needed     3. Menorrhagia:   On Lysteda     4. H/o gastric bypass  S/P IV iron   Continue B12    5.  Vitamin D deficiency: Being managed by PCP  RTC in 3 months             Yara Sarabia MD

## 2019-08-15 NOTE — PROGRESS NOTES
Donell Roach is a 30 y.o. female presenting today for a follow-up appointment. Patient is ambulatory with no assistive devices and reports 7/10 pain in bilateral knees. Chief Complaint   Patient presents with    Anemia     Iron deficiency anemia       Visit Vitals  /81   Pulse 77   Temp 97.8 °F (36.6 °C) (Oral)   Resp 18   Wt 259 lb 12.8 oz (117.8 kg)   LMP 08/04/2019   SpO2 98%   BMI 38.37 kg/m²       Current Outpatient Medications   Medication Sig    thiamine HCL (VITAMIN B-1) 100 mg tablet Take  by mouth daily.  calcium citrate-vitamin d2 1,500-200 mg-unit tab Take  by mouth.  montelukast (SINGULAIR) 10 mg tablet Take 10 mg by mouth daily.  multivitamin with iron (FLINTSTONES) chewable tablet Take 1 tablet by mouth two (2) times a day.  Cholecalciferol, Vitamin D3, (VITAMIN D3) 2,000 unit cap capsule Take  by mouth two (2) times a day.  cyanocobalamin (VITAMIN B12) 500 mcg tablet Take 500 mcg by mouth two (2) times a day.  albuterol (PROAIR HFA) 90 mcg/actuation inhaler Take 2 Puffs by inhalation as needed. No current facility-administered medications for this visit. Medications no longer taking/discontinued: none    Fall Risk Assessment, last 12 mths 7/10/2019   Able to walk? Yes   Fall in past 12 months? No       3 most recent PHQ Screens 7/10/2019   Little interest or pleasure in doing things Not at all   Feeling down, depressed, irritable, or hopeless Not at all   Total Score PHQ 2 0       Abuse Screening Questionnaire 7/10/2019   Do you ever feel afraid of your partner? N   Are you in a relationship with someone who physically or mentally threatens you? N   Is it safe for you to go home? Y       1. Have you been to the ER, urgent care clinic since your last visit? Hospitalized since your last visit? No    2. Have you seen or consulted any other health care providers outside of the [address] [place] [address] since your last visit?   Include any pap smears or colon screening.  No

## 2019-08-18 LAB
ALBUMIN SERPL-MCNC: 4.6 G/DL (ref 3.5–5.5)
ALBUMIN/GLOB SERPL: 1.6 {RATIO} (ref 1.2–2.2)
ALP SERPL-CCNC: 87 IU/L (ref 39–117)
ALT SERPL-CCNC: 15 IU/L (ref 0–32)
AST SERPL-CCNC: 20 IU/L (ref 0–40)
BASOPHILS # BLD AUTO: 0 X10E3/UL (ref 0–0.2)
BASOPHILS NFR BLD AUTO: 1 %
BILIRUB SERPL-MCNC: 0.4 MG/DL (ref 0–1.2)
BUN SERPL-MCNC: 13 MG/DL (ref 6–20)
BUN/CREAT SERPL: 18 (ref 9–23)
CALCIUM SERPL-MCNC: 8.8 MG/DL (ref 8.7–10.2)
CHLORIDE SERPL-SCNC: 101 MMOL/L (ref 96–106)
CO2 SERPL-SCNC: 26 MMOL/L (ref 20–29)
CREAT SERPL-MCNC: 0.72 MG/DL (ref 0.57–1)
EOSINOPHIL # BLD AUTO: 0.1 X10E3/UL (ref 0–0.4)
EOSINOPHIL NFR BLD AUTO: 2 %
ERYTHROCYTE [DISTWIDTH] IN BLOOD BY AUTOMATED COUNT: 14.2 % (ref 12.3–15.4)
FERRITIN SERPL-MCNC: 99 NG/ML (ref 15–150)
FOLATE SERPL-MCNC: 3.8 NG/ML
GLOBULIN SER CALC-MCNC: 2.8 G/DL (ref 1.5–4.5)
GLUCOSE SERPL-MCNC: 88 MG/DL (ref 65–99)
HCT VFR BLD AUTO: 35.7 % (ref 34–46.6)
HGB BLD-MCNC: 11.6 G/DL (ref 11.1–15.9)
IMM GRANULOCYTES # BLD AUTO: 0 X10E3/UL (ref 0–0.1)
IMM GRANULOCYTES NFR BLD AUTO: 0 %
IRON SATN MFR SERPL: 33 % (ref 15–55)
IRON SERPL-MCNC: 114 UG/DL (ref 27–159)
LYMPHOCYTES # BLD AUTO: 1.3 X10E3/UL (ref 0.7–3.1)
LYMPHOCYTES NFR BLD AUTO: 23 %
Lab: ABNORMAL
MCH RBC QN AUTO: 27.1 PG (ref 26.6–33)
MCHC RBC AUTO-ENTMCNC: 32.5 G/DL (ref 31.5–35.7)
MCV RBC AUTO: 83 FL (ref 79–97)
METHYLMALONATE SERPL-SCNC: 388 NMOL/L (ref 0–378)
MONOCYTES # BLD AUTO: 0.6 X10E3/UL (ref 0.1–0.9)
MONOCYTES NFR BLD AUTO: 10 %
NEUTROPHILS # BLD AUTO: 3.7 X10E3/UL (ref 1.4–7)
NEUTROPHILS NFR BLD AUTO: 64 %
PLATELET # BLD AUTO: 273 X10E3/UL (ref 150–450)
POTASSIUM SERPL-SCNC: 4.4 MMOL/L (ref 3.5–5.2)
PROT SERPL-MCNC: 7.4 G/DL (ref 6–8.5)
RBC # BLD AUTO: 4.28 X10E6/UL (ref 3.77–5.28)
SODIUM SERPL-SCNC: 141 MMOL/L (ref 134–144)
SPECIMEN STATUS REPORT, ROLRST: NORMAL
TIBC SERPL-MCNC: 346 UG/DL (ref 250–450)
UIBC SERPL-MCNC: 232 UG/DL (ref 131–425)
VIT B12 SERPL-MCNC: 267 PG/ML (ref 232–1245)
WBC # BLD AUTO: 5.7 X10E3/UL (ref 3.4–10.8)

## 2019-08-30 RX ORDER — CYANOCOBALAMIN 1000 UG/ML
1000 INJECTION, SOLUTION INTRAMUSCULAR; SUBCUTANEOUS ONCE
Status: CANCELLED | OUTPATIENT
Start: 2019-09-06 | End: 2019-09-07

## 2019-09-03 ENCOUNTER — HOSPITAL ENCOUNTER (OUTPATIENT)
Dept: INFUSION THERAPY | Age: 29
Discharge: HOME | End: 2019-09-03
Payer: MEDICAID

## 2019-09-03 VITALS
HEART RATE: 70 BPM | DIASTOLIC BLOOD PRESSURE: 82 MMHG | OXYGEN SATURATION: 98 % | TEMPERATURE: 97.8 F | SYSTOLIC BLOOD PRESSURE: 139 MMHG | RESPIRATION RATE: 17 BRPM

## 2019-09-03 PROCEDURE — 74011250636 HC RX REV CODE- 250/636: Performed by: INTERNAL MEDICINE

## 2019-09-03 PROCEDURE — 96372 THER/PROPH/DIAG INJ SC/IM: CPT

## 2019-09-03 RX ORDER — CYANOCOBALAMIN 1000 UG/ML
1000 INJECTION, SOLUTION INTRAMUSCULAR; SUBCUTANEOUS ONCE
Status: COMPLETED | OUTPATIENT
Start: 2019-09-03 | End: 2019-09-03

## 2019-09-03 RX ADMIN — CYANOCOBALAMIN 1000 MCG: 1000 INJECTION, SOLUTION INTRAMUSCULAR at 13:49

## 2019-09-03 NOTE — PROGRESS NOTES
[facility] Progress Note    Date: September 3, 2019    Name: Marcio Dasilva    MRN: [id number]         : 1990    B12 Injection    Ms. Case arrived to [facility] at [phone]. Ms. Anita Garcia was assessed and education was provided. Ms. Case's vitals were reviewed. Visit Vitals  /82 (BP 1 Location: Right arm, BP Patient Position: Sitting)   Pulse 70   Temp 97.8 °F (36.6 °C)   Resp 17   SpO2 98%   Breastfeeding? No       Cyanocobalamin (B12) 1000mcg was administered as ordered SQ in patient's Right upper arm. Ms. Anita Garcia tolerated well without complaints. Ms. Anita Garcia declined to stay for observation. B12 CareNotes printed, signed and scanned into patient chart. Ms. Anita Garcia was discharged from [place] [address] in stable condition at 1340. She is to return on 19 at  for her next B12 injection appointment.     Paresh Esqueda RN  September 3, 2019  [phone]

## 2019-09-04 ENCOUNTER — HOSPITAL ENCOUNTER (OUTPATIENT)
Dept: INFUSION THERAPY | Age: 29
Discharge: HOME | End: 2019-09-04
Payer: MEDICAID

## 2019-09-04 VITALS
DIASTOLIC BLOOD PRESSURE: 91 MMHG | HEART RATE: 65 BPM | OXYGEN SATURATION: 100 % | SYSTOLIC BLOOD PRESSURE: 148 MMHG | RESPIRATION RATE: 18 BRPM | TEMPERATURE: 98.4 F

## 2019-09-04 PROCEDURE — 74011250636 HC RX REV CODE- 250/636: Performed by: INTERNAL MEDICINE

## 2019-09-04 PROCEDURE — 96372 THER/PROPH/DIAG INJ SC/IM: CPT

## 2019-09-04 RX ORDER — CYANOCOBALAMIN 1000 UG/ML
1000 INJECTION, SOLUTION INTRAMUSCULAR; SUBCUTANEOUS ONCE
Status: COMPLETED | OUTPATIENT
Start: 2019-09-04 | End: 2019-09-04

## 2019-09-04 RX ADMIN — CYANOCOBALAMIN 1000 MCG: 1000 INJECTION, SOLUTION INTRAMUSCULAR at 15:43

## 2019-09-04 NOTE — PROGRESS NOTES
[facility] Progress Note    Date: 2019    Name: Colin Horowitz    MRN: [id number]         : 1990    B12 Injection daily 2 of 7    Ms. Case arrived to [facility] at . Ms. Oswaldo Luciano was assessed and education was provided. Ms. Case's vitals were reviewed. Visit Vitals  BP (!) 148/91 (BP 1 Location: Right arm, BP Patient Position: Sitting)   Pulse 65   Temp 98.4 °F (36.9 °C)   Resp 18   SpO2 100%       Cyanocobalamin (B12) 1000mcg was administered as ordered SQ in patient's Right upper arm. Ms. Oswaldo Luciano tolerated well without complaints. Ms. Oswaldo Luciano was discharged from [place] [address] in stable condition at 1550. She is to return on 19 at  for her next B12 injection appointment.     Gregorio Llanos RN  2019

## 2019-09-05 ENCOUNTER — HOSPITAL ENCOUNTER (OUTPATIENT)
Dept: INFUSION THERAPY | Age: 29
Discharge: HOME | End: 2019-09-05
Payer: MEDICAID

## 2019-09-05 VITALS
OXYGEN SATURATION: 98 % | DIASTOLIC BLOOD PRESSURE: 84 MMHG | RESPIRATION RATE: 18 BRPM | HEART RATE: 70 BPM | TEMPERATURE: 98.2 F | SYSTOLIC BLOOD PRESSURE: 126 MMHG

## 2019-09-05 PROCEDURE — 74011250636 HC RX REV CODE- 250/636: Performed by: INTERNAL MEDICINE

## 2019-09-05 PROCEDURE — 96372 THER/PROPH/DIAG INJ SC/IM: CPT

## 2019-09-05 RX ORDER — CYANOCOBALAMIN 1000 UG/ML
1000 INJECTION, SOLUTION INTRAMUSCULAR; SUBCUTANEOUS ONCE
Status: COMPLETED | OUTPATIENT
Start: 2019-09-05 | End: 2019-09-05

## 2019-09-05 RX ADMIN — CYANOCOBALAMIN 1000 MCG: 1000 INJECTION, SOLUTION INTRAMUSCULAR at 09:47

## 2019-09-05 NOTE — PROGRESS NOTES
[facility] Progress Note    Date: 2019    Name: Eleno Chang    MRN: [id number]         : 1990    B12 Injection    Ms. Case arrived to [facility] at 9665. Ms. Laura Robertson was assessed and education was provided. Ms. Case's vitals were reviewed. Visit Vitals  /84 (BP 1 Location: Right arm, BP Patient Position: Sitting)   Pulse 70   Temp 98.2 °F (36.8 °C)   Resp 18   SpO2 98%       Cyanocobalamin (B12) 1000mcg was administered as ordered SQ in patient's Right upper arm. Ms. Laura Robertson tolerated well without complaints. Ms. Laura Robertson declined to stay for observation. B12 CareNotes printed, signed and scanned into patient chart. Ms. Laura Robertson was discharged from [place] [address] in stable condition at 0950. She is to return on 19 at  for her next B12 injection appointment.     Amol Baron RN  2019  [phone]

## 2019-09-06 ENCOUNTER — HOSPITAL ENCOUNTER (OUTPATIENT)
Dept: INFUSION THERAPY | Age: 29
End: 2019-09-06
Payer: MEDICAID

## 2019-09-09 RX ORDER — CYANOCOBALAMIN 1000 UG/ML
1000 INJECTION, SOLUTION INTRAMUSCULAR; SUBCUTANEOUS ONCE
Status: CANCELLED | OUTPATIENT
Start: 2019-09-16 | End: 2019-09-17

## 2019-09-11 ENCOUNTER — HOSPITAL ENCOUNTER (OUTPATIENT)
Dept: INFUSION THERAPY | Age: 29
Discharge: HOME | End: 2019-09-11
Payer: MEDICAID

## 2019-09-11 VITALS
SYSTOLIC BLOOD PRESSURE: 135 MMHG | HEART RATE: 91 BPM | OXYGEN SATURATION: 98 % | DIASTOLIC BLOOD PRESSURE: 86 MMHG | RESPIRATION RATE: 18 BRPM | TEMPERATURE: 98.2 F

## 2019-09-11 PROCEDURE — 96372 THER/PROPH/DIAG INJ SC/IM: CPT

## 2019-09-11 PROCEDURE — 74011250636 HC RX REV CODE- 250/636: Performed by: INTERNAL MEDICINE

## 2019-09-11 RX ORDER — CYANOCOBALAMIN 1000 UG/ML
1000 INJECTION, SOLUTION INTRAMUSCULAR; SUBCUTANEOUS ONCE
Status: COMPLETED | OUTPATIENT
Start: 2019-09-11 | End: 2019-09-11

## 2019-09-11 RX ADMIN — CYANOCOBALAMIN 1000 MCG: 1000 INJECTION, SOLUTION INTRAMUSCULAR at 15:58

## 2019-09-11 NOTE — PROGRESS NOTES
[facility] Progress Note    Date: 2019    Name: Elvis Mortimer    MRN: [id number]         : 1990    B12 Injection    Ms. Case arrived to [facility] at [facility]. Ms. Ryder Jean-Baptiste was assessed and education was provided. Ms. Case's vitals were reviewed. Visit Vitals  /86 (BP 1 Location: Right arm, BP Patient Position: Sitting)   Pulse 91   Temp 98.2 °F (36.8 °C)   Resp 18   SpO2 98%       Cyanocobalamin (B12) 1000mcg was administered as ordered SQ in patient's Left upper arm. Ms. Ryder Jean-Baptiste tolerated well without complaints. Ms. Ryder Jean-Baptiste declined to stay for observation. Ms. Ryder Jean-Baptiste was discharged from [place] [address] in stable condition at 1600. She is to return on 19 at 0 for her next B12 injection appointment.     Rufus Patterson RN  2019

## 2019-09-12 ENCOUNTER — HOSPITAL ENCOUNTER (OUTPATIENT)
Dept: INFUSION THERAPY | Age: 29
Discharge: HOME | End: 2019-09-12
Payer: MEDICAID

## 2019-09-12 VITALS
DIASTOLIC BLOOD PRESSURE: 76 MMHG | OXYGEN SATURATION: 97 % | RESPIRATION RATE: 18 BRPM | SYSTOLIC BLOOD PRESSURE: 124 MMHG | HEART RATE: 73 BPM | TEMPERATURE: 98.4 F

## 2019-09-12 PROCEDURE — 74011250636 HC RX REV CODE- 250/636: Performed by: INTERNAL MEDICINE

## 2019-09-12 PROCEDURE — 96372 THER/PROPH/DIAG INJ SC/IM: CPT

## 2019-09-12 RX ORDER — CYANOCOBALAMIN 1000 UG/ML
1000 INJECTION, SOLUTION INTRAMUSCULAR; SUBCUTANEOUS ONCE
Status: DISCONTINUED | OUTPATIENT
Start: 2019-09-16 | End: 2019-09-16 | Stop reason: HOSPADM

## 2019-09-12 RX ORDER — CYANOCOBALAMIN 1000 UG/ML
1000 INJECTION, SOLUTION INTRAMUSCULAR; SUBCUTANEOUS ONCE
Status: COMPLETED | OUTPATIENT
Start: 2019-09-12 | End: 2019-09-12

## 2019-09-12 RX ADMIN — CYANOCOBALAMIN 1000 MCG: 1000 INJECTION, SOLUTION INTRAMUSCULAR at 15:30

## 2019-09-13 ENCOUNTER — HOSPITAL ENCOUNTER (OUTPATIENT)
Dept: INFUSION THERAPY | Age: 29
Discharge: HOME | End: 2019-09-13
Payer: MEDICAID

## 2019-09-13 VITALS
TEMPERATURE: 98.3 F | DIASTOLIC BLOOD PRESSURE: 87 MMHG | SYSTOLIC BLOOD PRESSURE: 133 MMHG | OXYGEN SATURATION: 98 % | RESPIRATION RATE: 18 BRPM | HEART RATE: 63 BPM

## 2019-09-13 PROCEDURE — 74011250636 HC RX REV CODE- 250/636: Performed by: INTERNAL MEDICINE

## 2019-09-13 PROCEDURE — 96372 THER/PROPH/DIAG INJ SC/IM: CPT

## 2019-09-13 RX ORDER — CYANOCOBALAMIN 1000 UG/ML
1000 INJECTION, SOLUTION INTRAMUSCULAR; SUBCUTANEOUS ONCE
Status: COMPLETED | OUTPATIENT
Start: 2019-09-13 | End: 2019-09-13

## 2019-09-13 RX ADMIN — CYANOCOBALAMIN 1000 MCG: 1000 INJECTION, SOLUTION INTRAMUSCULAR at 15:44

## 2019-09-13 NOTE — PROGRESS NOTES
[facility] Progress Note    Date: 2019    Name: Jerrica Guzman    MRN: [id number]         : 1990    B12 Injection    Ms. Case arrived to [facility] at [phone]. Ms. Bruno Mcgarry was assessed and education was provided. Ms. Case's vitals were reviewed. Visit Vitals  /87 (BP 1 Location: Right arm, BP Patient Position: Sitting)   Pulse 63   Temp 98.3 °F (36.8 °C)   Resp 18   SpO2 98%       Cyanocobalamin (B12) 1000mcg was administered as ordered SQ in patient's Left upper arm. Ms. Bruno Mcgarry tolerated well without complaints. B12 CareNotes printed, signed and scanned into patient chart. Ms. Bruno Mcgarry was discharged from [place] [address] in stable condition at 1550. She is to return on 19 at  for her next B12 injection appointment.     Viraj Randall RN  2019  1550

## 2019-09-16 ENCOUNTER — HOSPITAL ENCOUNTER (OUTPATIENT)
Dept: INFUSION THERAPY | Age: 29
Discharge: HOME | End: 2019-09-16
Payer: MEDICAID

## 2019-09-16 VITALS
HEART RATE: 76 BPM | SYSTOLIC BLOOD PRESSURE: 133 MMHG | DIASTOLIC BLOOD PRESSURE: 91 MMHG | TEMPERATURE: 98.8 F | RESPIRATION RATE: 18 BRPM | OXYGEN SATURATION: 98 %

## 2019-09-16 PROCEDURE — 96372 THER/PROPH/DIAG INJ SC/IM: CPT

## 2019-09-16 PROCEDURE — 74011250636 HC RX REV CODE- 250/636: Performed by: INTERNAL MEDICINE

## 2019-09-16 RX ORDER — CYANOCOBALAMIN 1000 UG/ML
1000 INJECTION, SOLUTION INTRAMUSCULAR; SUBCUTANEOUS ONCE
Status: COMPLETED | OUTPATIENT
Start: 2019-09-16 | End: 2019-09-16

## 2019-09-16 RX ADMIN — CYANOCOBALAMIN 1000 MCG: 1000 INJECTION, SOLUTION INTRAMUSCULAR at 15:33

## 2019-09-16 NOTE — PROGRESS NOTES
[facility] Progress Note    Date: 2019    Name: Franky Frankel    MRN: [id number]         : 1990    B12 Injection    Ms. Case arrived to [facility] at 0. Ms. Sonya Emerson was assessed and education was provided. Ms. Case's vitals were reviewed. Visit Vitals  BP (!) 133/91 (BP 1 Location: Right arm, BP Patient Position: Sitting)   Pulse 76   Temp 98.8 °F (37.1 °C)   Resp 18   SpO2 98%       Cyanocobalamin (B12) 1000mcg was administered as ordered SQ in patient's right upper arm. Ms. Sonya Emerson tolerated well without complaints. Ms. Sonya Emerson was discharged from [place] [address] in stable condition at 1540. She is to return on 19 at 0 for her next B12 injection appointment.     Augusto Hines RN  2019  1540

## 2019-09-20 ENCOUNTER — APPOINTMENT (OUTPATIENT)
Dept: INFUSION THERAPY | Age: 29
End: 2019-09-20
Payer: MEDICAID

## 2019-09-25 ENCOUNTER — HOSPITAL ENCOUNTER (OUTPATIENT)
Dept: INFUSION THERAPY | Age: 29
Discharge: HOME | End: 2019-09-25
Payer: MEDICAID

## 2019-09-25 VITALS
OXYGEN SATURATION: 99 % | RESPIRATION RATE: 18 BRPM | DIASTOLIC BLOOD PRESSURE: 87 MMHG | HEART RATE: 61 BPM | SYSTOLIC BLOOD PRESSURE: 134 MMHG | TEMPERATURE: 98.6 F

## 2019-09-25 PROCEDURE — 74011250636 HC RX REV CODE- 250/636: Performed by: INTERNAL MEDICINE

## 2019-09-25 PROCEDURE — 96372 THER/PROPH/DIAG INJ SC/IM: CPT

## 2019-09-25 RX ORDER — CYANOCOBALAMIN 1000 UG/ML
1000 INJECTION, SOLUTION INTRAMUSCULAR; SUBCUTANEOUS ONCE
Status: COMPLETED | OUTPATIENT
Start: 2019-09-25 | End: 2019-09-25

## 2019-09-25 RX ADMIN — CYANOCOBALAMIN 1000 MCG: 1000 INJECTION, SOLUTION INTRAMUSCULAR at 13:09

## 2019-09-25 NOTE — PROGRESS NOTES
[facility] Progress Note    Date: 2019    Name: Boo Wolff    MRN: [id number]         : 1990    B12 Injection    Ms. Case arrived to [facility] at . Ms. Janna Byrd was assessed and education was provided. Ms. Case's vitals were reviewed. Visit Vitals  /87 (BP 1 Location: Left arm, BP Patient Position: Sitting)   Pulse 61   Temp 98.6 °F (37 °C)   Resp 18   SpO2 99%       Cyanocobalamin (B12) 1000mcg was administered as ordered SQ in patient's right upper arm. Ms. Janna Byrd tolerated well without complaints. Ms. Janna Byrd was discharged from [place] [address] in stable condition at 1315. She is to return on 10/2/19 at 0 for her next B12 injection appointment.     Fern Swenson RN  2019  1315

## 2019-09-27 ENCOUNTER — APPOINTMENT (OUTPATIENT)
Dept: INFUSION THERAPY | Age: 29
End: 2019-09-27
Payer: MEDICAID

## 2019-10-02 ENCOUNTER — HOSPITAL ENCOUNTER (OUTPATIENT)
Dept: INFUSION THERAPY | Age: 29
Discharge: HOME | End: 2019-10-02
Payer: MEDICAID

## 2019-10-02 VITALS
SYSTOLIC BLOOD PRESSURE: 135 MMHG | TEMPERATURE: 98.7 F | OXYGEN SATURATION: 99 % | RESPIRATION RATE: 18 BRPM | DIASTOLIC BLOOD PRESSURE: 82 MMHG | HEART RATE: 87 BPM

## 2019-10-02 PROCEDURE — 74011250636 HC RX REV CODE- 250/636: Performed by: INTERNAL MEDICINE

## 2019-10-02 PROCEDURE — 96372 THER/PROPH/DIAG INJ SC/IM: CPT

## 2019-10-02 RX ORDER — CYANOCOBALAMIN 1000 UG/ML
1000 INJECTION, SOLUTION INTRAMUSCULAR; SUBCUTANEOUS ONCE
Status: COMPLETED | OUTPATIENT
Start: 2019-10-02 | End: 2019-10-02

## 2019-10-02 RX ADMIN — CYANOCOBALAMIN 1000 MCG: 1000 INJECTION, SOLUTION INTRAMUSCULAR at 15:50

## 2019-10-02 NOTE — PROGRESS NOTES
[facility] Progress Note    Date: 2019    Name: Marcio Dasilva    MRN: [id number]         : 1990    B12 Injection    Ms. Case arrived to [facility] at 7116. Ms. Anita Garcia was assessed and education was provided. Ms. Case's vitals were reviewed. Visit Vitals  /82 (BP 1 Location: Right arm, BP Patient Position: Sitting)   Pulse 87   Temp 98.7 °F (37.1 °C)   Resp 18   SpO2 99%       Cyanocobalamin (B12) 1000mcg was administered as ordered SQ in patient's left upper arm. Ms. Anita Garcia tolerated well without complaints. Ms. Anita Garcia was discharged from [place] [address] in stable condition at [phone]. She is to return on 10/9/19 at  for her next B12 injection appointment.     Paresh Esqueda RN  2019  0581

## 2019-10-04 ENCOUNTER — APPOINTMENT (OUTPATIENT)
Dept: INFUSION THERAPY | Age: 29
End: 2019-10-04
Payer: MEDICAID

## 2019-10-09 ENCOUNTER — HOSPITAL ENCOUNTER (OUTPATIENT)
Dept: INFUSION THERAPY | Age: 29
Discharge: HOME | End: 2019-10-09
Payer: MEDICAID

## 2019-10-09 VITALS
SYSTOLIC BLOOD PRESSURE: 138 MMHG | TEMPERATURE: 97.6 F | DIASTOLIC BLOOD PRESSURE: 84 MMHG | RESPIRATION RATE: 18 BRPM | HEART RATE: 63 BPM | OXYGEN SATURATION: 99 %

## 2019-10-09 PROCEDURE — 96372 THER/PROPH/DIAG INJ SC/IM: CPT

## 2019-10-09 PROCEDURE — 74011250636 HC RX REV CODE- 250/636: Performed by: INTERNAL MEDICINE

## 2019-10-09 RX ORDER — CYANOCOBALAMIN 1000 UG/ML
1000 INJECTION, SOLUTION INTRAMUSCULAR; SUBCUTANEOUS ONCE
Status: COMPLETED | OUTPATIENT
Start: 2019-10-09 | End: 2019-10-09

## 2019-10-09 RX ADMIN — CYANOCOBALAMIN 1000 MCG: 1000 INJECTION, SOLUTION INTRAMUSCULAR at 15:53

## 2019-10-09 NOTE — PROGRESS NOTES
[facility]    Date: 2019    Name: Marilu Lorenz    MRN: [id number]         : 1990    B12 Injection    Ms. Case arrived to [facility] at [facility]. Ms. Michael Inman was assessed and education was provided. Ms. Case's vitals were reviewed. Visit Vitals  /84 (BP 1 Location: Right arm, BP Patient Position: Sitting)   Pulse 63   Temp 97.6 °F (36.4 °C)   Resp 18   SpO2 99%       Cyanocobalamin (B12) 1000mcg was administered as ordered SQ in patient's right upper arm. Ms. Michael Inman tolerated well without complaints. Ms. Michael Inman was discharged from [place] [address] in stable condition at 1545. She is to return on 10/16/19 at 1400 for her next B12 injection appointment.     Jayshree Carlisle RN  2019  1555

## 2019-10-11 ENCOUNTER — APPOINTMENT (OUTPATIENT)
Dept: INFUSION THERAPY | Age: 29
End: 2019-10-11
Payer: MEDICAID

## 2019-10-16 ENCOUNTER — HOSPITAL ENCOUNTER (OUTPATIENT)
Dept: INFUSION THERAPY | Age: 29
Discharge: HOME | End: 2019-10-16
Payer: MEDICAID

## 2019-10-16 VITALS
RESPIRATION RATE: 18 BRPM | OXYGEN SATURATION: 100 % | DIASTOLIC BLOOD PRESSURE: 72 MMHG | TEMPERATURE: 97.2 F | SYSTOLIC BLOOD PRESSURE: 129 MMHG | HEART RATE: 86 BPM

## 2019-10-16 PROCEDURE — 96372 THER/PROPH/DIAG INJ SC/IM: CPT

## 2019-10-16 PROCEDURE — 74011250636 HC RX REV CODE- 250/636: Performed by: INTERNAL MEDICINE

## 2019-10-16 RX ORDER — CYANOCOBALAMIN 1000 UG/ML
1000 INJECTION, SOLUTION INTRAMUSCULAR; SUBCUTANEOUS ONCE
Status: DISCONTINUED | OUTPATIENT
Start: 2019-10-16 | End: 2019-10-16

## 2019-10-16 RX ORDER — CYANOCOBALAMIN 1000 UG/ML
1000 INJECTION, SOLUTION INTRAMUSCULAR; SUBCUTANEOUS ONCE
Status: COMPLETED | OUTPATIENT
Start: 2019-10-16 | End: 2019-10-16

## 2019-10-16 RX ADMIN — CYANOCOBALAMIN 1000 MCG: 1000 INJECTION, SOLUTION INTRAMUSCULAR at 14:03

## 2019-11-06 RX ORDER — EPINEPHRINE 1 MG/ML
0.3 INJECTION, SOLUTION, CONCENTRATE INTRAVENOUS AS NEEDED
Status: CANCELLED | OUTPATIENT
Start: 2019-11-13

## 2019-11-06 RX ORDER — HYDROCORTISONE SODIUM SUCCINATE 100 MG/2ML
100 INJECTION, POWDER, FOR SOLUTION INTRAMUSCULAR; INTRAVENOUS AS NEEDED
Status: CANCELLED | OUTPATIENT
Start: 2019-11-13

## 2019-11-06 RX ORDER — ACETAMINOPHEN 325 MG/1
650 TABLET ORAL AS NEEDED
Status: CANCELLED
Start: 2019-11-13

## 2019-11-06 RX ORDER — DIPHENHYDRAMINE HYDROCHLORIDE 50 MG/ML
50 INJECTION, SOLUTION INTRAMUSCULAR; INTRAVENOUS AS NEEDED
Status: CANCELLED
Start: 2019-11-13

## 2019-11-06 RX ORDER — ALBUTEROL SULFATE 0.83 MG/ML
2.5 SOLUTION RESPIRATORY (INHALATION) AS NEEDED
Status: CANCELLED
Start: 2019-11-13

## 2019-11-06 RX ORDER — ONDANSETRON 2 MG/ML
8 INJECTION INTRAMUSCULAR; INTRAVENOUS AS NEEDED
Status: CANCELLED | OUTPATIENT
Start: 2019-11-13

## 2019-11-13 ENCOUNTER — HOSPITAL ENCOUNTER (OUTPATIENT)
Dept: INFUSION THERAPY | Age: 29
Discharge: HOME | End: 2019-11-13
Payer: MEDICAID

## 2019-11-13 VITALS
RESPIRATION RATE: 18 BRPM | DIASTOLIC BLOOD PRESSURE: 99 MMHG | SYSTOLIC BLOOD PRESSURE: 151 MMHG | HEART RATE: 92 BPM | TEMPERATURE: 100.5 F | OXYGEN SATURATION: 99 %

## 2019-11-13 DIAGNOSIS — E53.8 B12 DEFICIENCY: ICD-10-CM

## 2019-11-13 DIAGNOSIS — E53.8 B12 DEFICIENCY: Primary | ICD-10-CM

## 2019-11-13 PROCEDURE — 74011250636 HC RX REV CODE- 250/636: Performed by: INTERNAL MEDICINE

## 2019-11-13 PROCEDURE — 96372 THER/PROPH/DIAG INJ SC/IM: CPT

## 2019-11-13 RX ORDER — DIPHENHYDRAMINE HYDROCHLORIDE 50 MG/ML
50 INJECTION, SOLUTION INTRAMUSCULAR; INTRAVENOUS AS NEEDED
Status: CANCELLED
Start: 2019-12-11

## 2019-11-13 RX ORDER — ALBUTEROL SULFATE 0.83 MG/ML
2.5 SOLUTION RESPIRATORY (INHALATION) AS NEEDED
Status: CANCELLED
Start: 2019-12-11

## 2019-11-13 RX ORDER — ONDANSETRON 2 MG/ML
8 INJECTION INTRAMUSCULAR; INTRAVENOUS AS NEEDED
Status: CANCELLED | OUTPATIENT
Start: 2019-12-11

## 2019-11-13 RX ORDER — CYANOCOBALAMIN 1000 UG/ML
1000 INJECTION, SOLUTION INTRAMUSCULAR; SUBCUTANEOUS ONCE
Status: CANCELLED
Start: 2019-12-11

## 2019-11-13 RX ORDER — EPINEPHRINE 1 MG/ML
0.3 INJECTION, SOLUTION, CONCENTRATE INTRAVENOUS AS NEEDED
Status: CANCELLED | OUTPATIENT
Start: 2019-12-11

## 2019-11-13 RX ORDER — CYANOCOBALAMIN 1000 UG/ML
1000 INJECTION, SOLUTION INTRAMUSCULAR; SUBCUTANEOUS ONCE
Status: COMPLETED | OUTPATIENT
Start: 2019-11-13 | End: 2019-11-13

## 2019-11-13 RX ORDER — HYDROCORTISONE SODIUM SUCCINATE 100 MG/2ML
100 INJECTION, POWDER, FOR SOLUTION INTRAMUSCULAR; INTRAVENOUS AS NEEDED
Status: CANCELLED | OUTPATIENT
Start: 2019-12-11

## 2019-11-13 RX ORDER — ACETAMINOPHEN 325 MG/1
650 TABLET ORAL AS NEEDED
Status: CANCELLED
Start: 2019-12-11

## 2019-11-13 RX ADMIN — CYANOCOBALAMIN 1000 MCG: 1000 INJECTION, SOLUTION INTRAMUSCULAR at 09:32

## 2019-11-13 NOTE — PROGRESS NOTES
[facility] Progress Note    Date: 2019    Name: Immanuel Sibley    MRN: [id number]         : 1990    B12 Injection    Ms. Case arrived to [facility] at [place] [address]. Ms. Nicolle Moseley was assessed and education was provided. Ms. Case's vitals were reviewed. Visit Vitals  BP (!) 151/99 (BP 1 Location: Right arm, BP Patient Position: Sitting)   Pulse 92   Temp (!) 100.5 °F (38.1 °C)   Resp 18   SpO2 99%       Cyanocobalamin (B12) 1000mcg was administered as ordered SQ in patient's Right upper arm. Band aid placed at site. Ms. Nicolle Moseley had a fever at time of visit. She states she didn't feel well when she got up this morning. She was advised to follow up with physician if it persist.    Ms. Nicolle Moseley tolerated well without complaints. Ms. Nicolle Moseley was discharged from [place] [address] in stable condition at [facility]. She is to return on 19 at [employer] for her next B12 injection appointment.     Cherelle Snyder RN  2019

## 2019-11-18 ENCOUNTER — HOSPITAL ENCOUNTER (OUTPATIENT)
Dept: LAB | Age: 29
Discharge: HOME | End: 2019-11-18

## 2019-11-18 ENCOUNTER — OFFICE VISIT (OUTPATIENT)
Dept: ONCOLOGY | Age: 29
End: 2019-11-18

## 2019-11-18 VITALS
HEIGHT: 69 IN | HEART RATE: 63 BPM | RESPIRATION RATE: 18 BRPM | SYSTOLIC BLOOD PRESSURE: 123 MMHG | TEMPERATURE: 97.4 F | OXYGEN SATURATION: 97 % | WEIGHT: 264 LBS | BODY MASS INDEX: 39.1 KG/M2 | DIASTOLIC BLOOD PRESSURE: 82 MMHG

## 2019-11-18 DIAGNOSIS — N92.4 EXCESSIVE BLEEDING IN PREMENOPAUSAL PERIOD: ICD-10-CM

## 2019-11-18 DIAGNOSIS — E53.8 B12 DEFICIENCY: ICD-10-CM

## 2019-11-18 DIAGNOSIS — D50.9 IRON DEFICIENCY ANEMIA, UNSPECIFIED IRON DEFICIENCY ANEMIA TYPE: Primary | ICD-10-CM

## 2019-11-18 DIAGNOSIS — Z98.84 GASTRIC BYPASS STATUS FOR OBESITY: ICD-10-CM

## 2019-11-18 LAB — XX-LABCORP SPECIMEN COL,LCBCF: NORMAL

## 2019-11-18 PROCEDURE — 99001 SPECIMEN HANDLING PT-LAB: CPT

## 2019-11-18 RX ORDER — AZITHROMYCIN 250 MG/1
TABLET, FILM COATED ORAL
COMMUNITY
Start: 2019-11-14 | End: 2020-01-28

## 2019-11-18 NOTE — PROGRESS NOTES
[address] is a 30 y.o. 1990 female 30years old female with history of iron deficiency anemia, status post IV iron infusion with Injectafer x2 completed on 2019, here for follow-up.     In the interim patient was diagnosed on 2019 of right upper lobe pneumonia. She is currently on Z-Florian.     Today on review of system she denies any fevers, chills, shortness of breath, nausea vomiting or abdominal pain. No focal neurologic deficit. No melena or bright red blood per rectum. No pica or pagophagia. Reports some chronic knee pain which is better. Remaining 12 point of view of system negative. ECOG performance status 0. Independent with ADLs and IADLs.      Past Medical History:   Diagnosis Date    Anemia     Arrhythmia     palpitations/pt denies    Asthma     Depression     Dizziness     daily     Joint pain     Morbid obesity with BMI of 50.0-59.9, adult (HCC)     Shortness of breath     Sleep apnea     Unspecified sleep apnea      Past Surgical History:   Procedure Laterality Date    ABDOMEN SURGERY PROC UNLISTED  2014    gastric bypass with repair hiatal hernia    DELIVERY       x 3    HX GI      gastric bypass    HX GYN      c section x 4    HX ORTHOPAEDIC  2017    R ring finger fx repair, due to scar tissue     Social History     Socioeconomic History    Marital status: SINGLE     Spouse name: Not on file    Number of children: Not on file    Years of education: Not on file    Highest education level: Not on file   Tobacco Use    Smoking status: Former Smoker     Last attempt to quit: 2014     Years since quittin.4    Smokeless tobacco: Never Used   Substance and Sexual Activity    Alcohol use:  Yes     Alcohol/week: 1.7 standard drinks     Types: 2 Standard drinks or equivalent per week     Comment: occassionally    Drug use: No    Sexual activity: Yes     Birth control/protection: None     Family History   Problem Relation Age of Onset    No Known Problems Father     Obesity Mother     Asthma Mother        Current Outpatient Medications   Medication Sig Dispense Refill    thiamine HCL (VITAMIN B-1) 100 mg tablet Take  by mouth daily.  calcium citrate-vitamin d2 1,500-200 mg-unit tab Take  by mouth.  montelukast (SINGULAIR) 10 mg tablet Take 10 mg by mouth daily.  multivitamin with iron (FLINTSTONES) chewable tablet Take 1 tablet by mouth two (2) times a day.  Cholecalciferol, Vitamin D3, (VITAMIN D3) 2,000 unit cap capsule Take  by mouth two (2) times a day.  cyanocobalamin (VITAMIN B12) 500 mcg tablet Take 500 mcg by mouth two (2) times a day.  albuterol (PROAIR HFA) 90 mcg/actuation inhaler Take 2 Puffs by inhalation as needed. Allergies   Allergen Reactions    Pollen Extracts Other (comments)     Nasal symptoms       Review of Systems  Today on review of system she denies any fevers, chills, shortness of breath, nausea vomiting or abdominal pain. No focal neurologic deficit. No melena or bright red blood per rectum. No pica or pagophagia. Reports some chronic knee pain which is better. Remaining 12 point of view of system negative. ECOG performance status 0. Independent with ADLs and IADLs.      Objective:  Visit Vitals  /82 (BP 1 Location: Left arm, BP Patient Position: Sitting)   Pulse 63   Temp 97.4 °F (36.3 °C) (Oral)   Resp 18   Ht 5' 9\" (1.753 m)   Wt 119.7 kg (264 lb)   LMP 10/24/2019 (Approximate)   SpO2 97%   BMI 38.99 kg/m²       Physical Exam:   General appearance - alert, well appearing, and in no distress  Mental status - alert, oriented to person, place, and time  EYE-IAN, EOMI  ENT-ENT exam normal, no neck nodes or sinus tenderness  Mouth - mucous membranes moist, pharynx normal without lesions  Neck - supple, no significant adenopathy   Chest - clear to auscultation, no wheezes, rales or rhonchi, symmetric air entry   Heart - normal rate and regular rhythm Abdomen - soft, nontender, nondistended, no masses or organomegaly  Lymph- no adenopathy palpable  Ext-no pedal edema noted  Skin-Warm and dry. Neuro -alert, oriented, normal speech, no focal findings or movement disorder noted      Diagnostic Imaging     No results found for this or any previous visit. Results for orders placed during the hospital encounter of 08/14/16   XR 4TH FINGER RT MIN 2 V    Narrative EXAM:  XR 4TH FINGER RT 3 VIEWS    INDICATION:  swelling and pain    COMPARISON: None. FINDINGS: 3  views of the right fourth digit were obtained. Tiny acute avulsion fracture at the volar base of the ring finger middle phalanx  only demonstrated on the lateral views. Soft tissue swelling of the ring finger. There is additional tiny osseous fragment that appears corticated at the volar  base long finger middle phalanx, probably avulsion fracture which appears  chronic or sesamoid, correlate with point tenderness. Impression IMPRESSION:  As described in findings. Results for orders placed during the hospital encounter of 05/27/14   CT HEAD WO CONT    Narrative CT head without IV contrast    Comparison: None    Clinical information: Dizziness. Procedure:    CT of the head was performed without intravenous contrast.    Findings:    No intracranial hemorrhage, extra-axial fluid collection, mass, or mass effect. No shift of midline structures. No convincing acute ischemia. No skull  fracture. Paranasal sinuses and mastoid air cells look normal.      Impression Impression:    No acute intracranial abnormality.        Lab Results  Lab Results   Component Value Date/Time    WBC 5.7 08/15/2019 12:00 AM    HGB 11.6 08/15/2019 12:00 AM    HCT 35.7 08/15/2019 12:00 AM    PLATELET 228 11/54/9931 12:00 AM    MCV 83 08/15/2019 12:00 AM       Lab Results   Component Value Date/Time    Sodium 141 08/15/2019 12:00 AM    Potassium 4.4 08/15/2019 12:00 AM    Chloride 101 08/15/2019 12:00 AM    CO2 26 08/15/2019 12:00 AM    Anion gap 4 05/09/2016 09:15 PM    Glucose 88 08/15/2019 12:00 AM    BUN 13 08/15/2019 12:00 AM    Creatinine 0.72 08/15/2019 12:00 AM    BUN/Creatinine ratio 18 08/15/2019 12:00 AM    GFR est  08/15/2019 12:00 AM    GFR est non- 08/15/2019 12:00 AM    Calcium 8.8 08/15/2019 12:00 AM    AST (SGOT) 20 08/15/2019 12:00 AM    Alk. phosphatase 87 08/15/2019 12:00 AM    Protein, total 7.4 08/15/2019 12:00 AM    Albumin 4.6 08/15/2019 12:00 AM    Globulin 3.5 08/11/2015 01:15 PM    A-G Ratio 1.6 08/15/2019 12:00 AM    ALT (SGPT) 15 08/15/2019 12:00 AM       Assessment/Plan:     29 y.o. female with SOILA with   1. Iron deficiency anemia  Resolved as of labs done in August 2019. Recheck labs today to make sure that iron deficiency did not recur especially with a history of menorrhagia and gastric bypass.      2. Vitamin B12 deficiency:   Patient has vitamin B12 deficiency since her methylmalonic acid level was elevated at 388 in August 2019. She is currently on replacement. Recheck levels. 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     3. Menorrhagia: Much better now she says. On Lysteda     4. H/o gastric bypass  S/P IV iron   Continue B12 replacement.     RTC in 3 months              Juan Drummond MD

## 2019-11-18 NOTE — PROGRESS NOTES
[address] is a 30 y.o. female presents in office for    Chief Complaint   Patient presents with    Anemia        Visit Vitals  /82 (BP 1 Location: Left arm, BP Patient Position: Sitting)   Pulse 63   Temp 97.4 °F (36.3 °C) (Oral)   Resp 18   Ht 5' 9\" (1.753 m)   Wt 119.7 kg (264 lb)   SpO2 97%   BMI 38.99 kg/m²         Health Maintenance Due   Topic Date Due    Pneumococcal 0-64 years (1 of 1 - PPSV23) 02/22/1994    PAP AKA CERVICAL CYTOLOGY  02/22/2009    Influenza Age 5 to Adult  08/01/2019             1. Have you been to the ER, urgent care clinic since your last visit? Hospitalized since your last visit? [address] [place] [address] 11/13-11/14 r/t pneumonia    2. Have you seen or consulted any other health care providers outside of the [address] [place] [address] since your last visit? Include any pap smears or colon screening. [address] [place] [address]    3 most recent [address] 7/10/2019   Little interest or pleasure in doing things Not at all   Feeling down, depressed, irritable, or hopeless Not at all   Total Score PHQ 2 0       Abuse Screening Questionnaire 7/10/2019   Do you ever feel afraid of your partner? N   Are you in a relationship with someone who physically or mentally threatens you? N   Is it safe for you to go home? Y       Fall Risk Assessment, last 12 mths 7/10/2019   Able to walk? Yes   Fall in past 12 months?  No       Learning Assessment 7/10/2019   PRIMARY LEARNER Patient   BARRIERS PRIMARY LEARNER -   PRIMARY LANGUAGE ENGLISH   LEARNER PREFERENCE PRIMARY READING     -   ANSWERED BY self   RELATIONSHIP SELF

## 2019-11-19 LAB
BASOPHILS # BLD AUTO: 0.1 X10E3/UL (ref 0–0.2)
BASOPHILS NFR BLD AUTO: 1 %
EOSINOPHIL # BLD AUTO: 0.1 X10E3/UL (ref 0–0.4)
EOSINOPHIL NFR BLD AUTO: 2 %
ERYTHROCYTE [DISTWIDTH] IN BLOOD BY AUTOMATED COUNT: 13.5 % (ref 12.3–15.4)
FERRITIN SERPL-MCNC: 109 NG/ML (ref 15–150)
FOLATE SERPL-MCNC: 8.4 NG/ML
HCT VFR BLD AUTO: 35 % (ref 34–46.6)
HGB BLD-MCNC: 11.1 G/DL (ref 11.1–15.9)
IMM GRANULOCYTES # BLD AUTO: 0 X10E3/UL (ref 0–0.1)
IMM GRANULOCYTES NFR BLD AUTO: 1 %
IRON SATN MFR SERPL: 27 % (ref 15–55)
IRON SERPL-MCNC: 87 UG/DL (ref 27–159)
LYMPHOCYTES # BLD AUTO: 1.9 X10E3/UL (ref 0.7–3.1)
LYMPHOCYTES NFR BLD AUTO: 34 %
MCH RBC QN AUTO: 25.7 PG (ref 26.6–33)
MCHC RBC AUTO-ENTMCNC: 31.7 G/DL (ref 31.5–35.7)
MCV RBC AUTO: 81 FL (ref 79–97)
MONOCYTES # BLD AUTO: 0.5 X10E3/UL (ref 0.1–0.9)
MONOCYTES NFR BLD AUTO: 9 %
NEUTROPHILS # BLD AUTO: 3 X10E3/UL (ref 1.4–7)
NEUTROPHILS NFR BLD AUTO: 53 %
PLATELET # BLD AUTO: 233 X10E3/UL (ref 150–450)
RBC # BLD AUTO: 4.32 X10E6/UL (ref 3.77–5.28)
SPECIMEN STATUS REPORT, ROLRST: NORMAL
TIBC SERPL-MCNC: 320 UG/DL (ref 250–450)
TSH SERPL DL<=0.005 MIU/L-ACNC: 0.93 UIU/ML (ref 0.45–4.5)
UIBC SERPL-MCNC: 233 UG/DL (ref 131–425)
VIT B12 SERPL-MCNC: >2000 PG/ML (ref 232–1245)
WBC # BLD AUTO: 5.6 X10E3/UL (ref 3.4–10.8)

## 2019-12-11 ENCOUNTER — HOSPITAL ENCOUNTER (OUTPATIENT)
Dept: INFUSION THERAPY | Age: 29
Discharge: HOME | End: 2019-12-11
Payer: MEDICAID

## 2019-12-11 VITALS
RESPIRATION RATE: 18 BRPM | DIASTOLIC BLOOD PRESSURE: 88 MMHG | HEART RATE: 62 BPM | SYSTOLIC BLOOD PRESSURE: 148 MMHG | TEMPERATURE: 97.8 F | OXYGEN SATURATION: 98 %

## 2019-12-11 DIAGNOSIS — E53.8 B12 DEFICIENCY: ICD-10-CM

## 2019-12-11 DIAGNOSIS — E53.8 B12 DEFICIENCY: Primary | ICD-10-CM

## 2019-12-11 PROCEDURE — 96372 THER/PROPH/DIAG INJ SC/IM: CPT

## 2019-12-11 PROCEDURE — 74011250636 HC RX REV CODE- 250/636: Performed by: INTERNAL MEDICINE

## 2019-12-11 RX ORDER — DIPHENHYDRAMINE HYDROCHLORIDE 50 MG/ML
50 INJECTION, SOLUTION INTRAMUSCULAR; INTRAVENOUS AS NEEDED
Status: CANCELLED
Start: 2020-01-08

## 2019-12-11 RX ORDER — HYDROCORTISONE SODIUM SUCCINATE 100 MG/2ML
100 INJECTION, POWDER, FOR SOLUTION INTRAMUSCULAR; INTRAVENOUS AS NEEDED
Status: CANCELLED | OUTPATIENT
Start: 2020-01-08

## 2019-12-11 RX ORDER — ONDANSETRON 2 MG/ML
8 INJECTION INTRAMUSCULAR; INTRAVENOUS AS NEEDED
Status: CANCELLED | OUTPATIENT
Start: 2020-01-08

## 2019-12-11 RX ORDER — CYANOCOBALAMIN 1000 UG/ML
1000 INJECTION, SOLUTION INTRAMUSCULAR; SUBCUTANEOUS ONCE
Status: CANCELLED
Start: 2020-01-08

## 2019-12-11 RX ORDER — ACETAMINOPHEN 325 MG/1
650 TABLET ORAL AS NEEDED
Status: CANCELLED
Start: 2020-01-08

## 2019-12-11 RX ORDER — EPINEPHRINE 1 MG/ML
0.3 INJECTION, SOLUTION, CONCENTRATE INTRAVENOUS AS NEEDED
Status: CANCELLED | OUTPATIENT
Start: 2020-01-08

## 2019-12-11 RX ORDER — ALBUTEROL SULFATE 0.83 MG/ML
2.5 SOLUTION RESPIRATORY (INHALATION) AS NEEDED
Status: CANCELLED
Start: 2020-01-08

## 2019-12-11 RX ORDER — CYANOCOBALAMIN 1000 UG/ML
1000 INJECTION, SOLUTION INTRAMUSCULAR; SUBCUTANEOUS ONCE
Status: COMPLETED | OUTPATIENT
Start: 2019-12-11 | End: 2019-12-11

## 2019-12-11 RX ADMIN — CYANOCOBALAMIN 1000 MCG: 1000 INJECTION, SOLUTION INTRAMUSCULAR at 15:46

## 2020-01-08 DIAGNOSIS — E53.8 B12 DEFICIENCY: ICD-10-CM

## 2020-01-15 ENCOUNTER — APPOINTMENT (OUTPATIENT)
Dept: INFUSION THERAPY | Age: 30
End: 2020-01-15

## 2020-01-28 ENCOUNTER — OFFICE VISIT (OUTPATIENT)
Dept: SURGERY | Age: 30
End: 2020-01-28

## 2020-01-28 VITALS
TEMPERATURE: 97.2 F | RESPIRATION RATE: 18 BRPM | HEIGHT: 69 IN | OXYGEN SATURATION: 97 % | BODY MASS INDEX: 37.03 KG/M2 | WEIGHT: 250 LBS | HEART RATE: 64 BPM | SYSTOLIC BLOOD PRESSURE: 134 MMHG | DIASTOLIC BLOOD PRESSURE: 88 MMHG

## 2020-01-28 DIAGNOSIS — Z71.3 DIETARY COUNSELING AND SURVEILLANCE: ICD-10-CM

## 2020-01-28 DIAGNOSIS — Z98.84 HISTORY OF ROUX-EN-Y GASTRIC BYPASS: ICD-10-CM

## 2020-01-28 DIAGNOSIS — K91.2 POSTOPERATIVE MALABSORPTION: Primary | ICD-10-CM

## 2020-01-28 DIAGNOSIS — E66.9 CLASS 2 OBESITY WITHOUT SERIOUS COMORBIDITY WITH BODY MASS INDEX (BMI) OF 39.0 TO 39.9 IN ADULT, UNSPECIFIED OBESITY TYPE: ICD-10-CM

## 2020-01-28 NOTE — PROGRESS NOTES
Sara Negrete is a 30 y.o. female is now 5 years and 5 months status post laparoscopic gastric bypass surgery. Doing well overall. Currently on a bariatric stage 5 diet without difficulty. Taking in 64+ oz water, 45-60 g protein. 30 min of activity 2 days a week. Bowel movements are regular. The patient is not having any pain. . She is compliant with multivitamins, calcium, Vit D and B1 & 12 supplements. Patient denies use of NSAIDs, ETOH, carbonated beverages or use of straws. Weight Loss Metrics 2020 2020 2019 8/15/2019 7/10/2019 7/10/2019 2019   Pre op / Initial Wt 302 - - - 302 - -   Today's Wt - 250 lb 264 lb 259 lb 12.8 oz - 258 lb 264 lb   BMI - 36.92 kg/m2 38.99 kg/m2 38.37 kg/m2 - 38.1 kg/m2 38.99 kg/m2   Ideal Body Wt 146 - - - 146 - -   Excess Body Wt 156 - - - 156 - -   Goal Wt 177 - - - 177 - -   Wt loss to date 52 - - - 44 - -   % Wt Loss 0.42 - - - 0.35 - -   80% .8 - - - 124.8 - -       Body mass index is 36.92 kg/m².       Comorbidities:    Hypertension: not applicable  Diabetes: not applicable  Obstructive Sleep Apnea: resolved  Hyperlipidemia: not applicable  Stress Urinary Incontinence: not applicable  Gastroesophageal Reflux: not applicable  Weight related arthropathy:not applicable        Past Medical History:   Diagnosis Date    Anemia     Arrhythmia     palpitations/pt denies    Asthma     Depression     Dizziness     daily     Joint pain     Morbid obesity with BMI of 50.0-59.9, adult (HCC)     Shortness of breath     Sleep apnea     Unspecified sleep apnea        Past Surgical History:   Procedure Laterality Date    ABDOMEN SURGERY PROC UNLISTED  2014    gastric bypass with repair hiatal hernia    DELIVERY       x 3    HX GI      gastric bypass    HX GYN      c section x 4    HX ORTHOPAEDIC  2017    R ring finger fx repair, due to scar tissue       Current Outpatient Medications   Medication Sig Dispense Refill    thiamine HCL (VITAMIN B-1) 100 mg tablet Take  by mouth daily.  calcium citrate-vitamin d2 1,500-200 mg-unit tab Take  by mouth.  montelukast (SINGULAIR) 10 mg tablet Take 10 mg by mouth daily.  multivitamin with iron (FLINTSTONES) chewable tablet Take 1 tablet by mouth two (2) times a day.  Cholecalciferol, Vitamin D3, (VITAMIN D3) 2,000 unit cap capsule Take  by mouth two (2) times a day.  cyanocobalamin (VITAMIN B12) 500 mcg tablet Take 500 mcg by mouth two (2) times a day.  albuterol (PROAIR HFA) 90 mcg/actuation inhaler Take 2 Puffs by inhalation as needed. Allergies   Allergen Reactions    Pollen Extracts Other (comments)     Nasal symptoms       ROS:  Positive in BOLD  CONST: Fever, weight loss, fatigue or chills  GI: Nausea, vomiting, abdominal pain, change in bowel habits, hematochezia, melena, and GERD   INTEG: Dermatitis, abnormal moles  HEENT: Recent changes in vision, vertigo, epistaxis, dysphagia and hoarseness  CV: Chest pain, palpitations, HTN, edema and varicosities  RESP: Cough, shortness of breath, wheezing, hemoptysis, snoring and reactive airway disease  : Hematuria, dysuria, frequency, urgency, nocturia and stress urinary incontinence   MS: Weakness, joint pain and arthritis  ENDO: Diabetes, thyroid disease, polyuria, polydipsia, polyphagia, poor wound healing, heat intolerance, cold intolerance  LYMPH/HEME: Anemia, bruising and history of blood transfusions  NEURO: Dizziness, headache, fainting, seizures and stroke  PSYCH: Anxiety and depression      Physicial Exam:  Visit Vitals  /88   Pulse 64   Temp 97.2 °F (36.2 °C)   Resp 18   Ht 5' 9\" (1.753 m)   Wt 113.4 kg (250 lb)   SpO2 97%   BMI 36.92 kg/m²     General: 30 y.o.) female in no acute distress.   HEENT: Normocephalic, atraumatic, Pupils equal and reactive, nasopharynx clear, oropharynx clear and moist without lesions  NECK: Supple, no lymphadenopathy, thyromegaly, carotid bruits or jugular venous distension. trachea midline  RESP: Clear to auscultation bilaterally, no wheezes, rhonchi, or rales, normal respiratory excursion  CV: Regular rate and rhythm, no murmurs, rubs or gallops. 3+/4 pulses in bilateral dorsalis pedis and posterior tibialis. No distal edema or varicosities. ABD: Soft, nontender, nondistended, normoactive bowel sounds, no hernias, no hepatosplenomegaly  Extremities: Warm, well perfused, no tenderness or swelling, normal gait/station  Neuro: Sensation and strength grossly intact and symmetrical  Psych: Alert and oriented to person, place, and time. Labs: n/a    Assessment/Plan: Pt is currently 5 years and 5 months  s/p laparoscopic gastric bypass surgery with a total weight loss of 52 lbs to date, doing well  Labs were reviewed with patient. Patient was instructed to continue multivitamins, calcium, Vit D and B1 & 12 supplements  Stressed importance of hydration with SF clear liquids until urine clear. OK to advance diet as directed in bariatric manual with food content mainly meats/veggies. Encouraged support group attendance  Advised exercise program of 30 minutes daily  Reviewed back on track with patient- goal to lose 35 pounds prior to annual visit in August             >50% of 25 minute visit spent face to face time with Tomi consisted of counseling & coordinating and/or discussing treatment plans in reference to her The primary encounter diagnosis was Postoperative malabsorption. Diagnoses of Class 2 obesity without serious comorbidity with body mass index (BMI) of 39.0 to 39.9 in adult, unspecified obesity type, History of Antony-en-Y gastric bypass, BMI 39.0-39.9,adult, and Dietary counseling and surveillance were also pertinent to this visit.         You Marina, CRYSTAL-BC

## 2020-01-28 NOTE — PROGRESS NOTES
Chief Complaint   Patient presents with    Follow-up     Chief Complaint   Patient presents with    Follow-up   1. Have you been to the ER, urgent care clinic since your last visit? Hospitalized since your last visit? Yes er for pneumoniae    2. Have you seen or consulted any other health care providers outside of the [address] [place] [address] since your last visit? Include any pap smears or colon screening. No    Pt ID confirmed    Weight Loss Metrics 1/28/2020 1/28/2020 11/18/2019 8/15/2019 7/10/2019 7/10/2019 5/1/2019   Pre op / Initial Wt 302 - - - 302 - -   Today's Wt - 250 lb 264 lb 259 lb 12.8 oz - 258 lb 264 lb   BMI - 36.92 kg/m2 38.99 kg/m2 38.37 kg/m2 - 38.1 kg/m2 38.99 kg/m2   Ideal Body Wt 146 - - - 146 - -   Excess Body Wt 156 - - - 156 - -   Goal Wt 177 - - - 177 - -   Wt loss to date 52 - - - 44 - -   % Wt Loss 0.42 - - - 0.35 - -   80% .8 - - - 124.8 - -       Body mass index is 36.92 kg/m².     Post op medications:  MVI: 2x  Calcium Citrate: 500 milligram  Actigal 0  B-12 1000 microgram  Vit D 3 5000 units b1 100 milligrams

## 2020-02-05 DIAGNOSIS — E53.8 B12 DEFICIENCY: ICD-10-CM

## 2020-02-12 ENCOUNTER — APPOINTMENT (OUTPATIENT)
Dept: INFUSION THERAPY | Age: 30
End: 2020-02-12
Payer: MEDICAID

## 2020-02-14 ENCOUNTER — APPOINTMENT (OUTPATIENT)
Dept: INFUSION THERAPY | Age: 30
End: 2020-02-14
Payer: MEDICAID

## 2020-02-14 ENCOUNTER — HOSPITAL ENCOUNTER (OUTPATIENT)
Dept: INFUSION THERAPY | Age: 30
Discharge: HOME | End: 2020-02-14
Payer: MEDICAID

## 2020-02-14 DIAGNOSIS — K91.2 POSTOPERATIVE MALABSORPTION: Primary | ICD-10-CM

## 2020-02-14 DIAGNOSIS — E53.8 B12 DEFICIENCY: ICD-10-CM

## 2020-02-14 PROCEDURE — 96372 THER/PROPH/DIAG INJ SC/IM: CPT

## 2020-02-14 PROCEDURE — 74011250636 HC RX REV CODE- 250/636: Performed by: INTERNAL MEDICINE

## 2020-02-14 RX ORDER — CYANOCOBALAMIN 1000 UG/ML
1000 INJECTION, SOLUTION INTRAMUSCULAR; SUBCUTANEOUS ONCE
Status: COMPLETED | OUTPATIENT
Start: 2020-02-14 | End: 2020-02-14

## 2020-02-14 RX ORDER — CYANOCOBALAMIN 1000 UG/ML
1000 INJECTION, SOLUTION INTRAMUSCULAR; SUBCUTANEOUS ONCE
Status: CANCELLED
Start: 2020-03-04

## 2020-02-14 RX ORDER — DIPHENHYDRAMINE HYDROCHLORIDE 50 MG/ML
50 INJECTION, SOLUTION INTRAMUSCULAR; INTRAVENOUS AS NEEDED
Status: CANCELLED
Start: 2020-03-04

## 2020-02-14 RX ORDER — ALBUTEROL SULFATE 0.83 MG/ML
2.5 SOLUTION RESPIRATORY (INHALATION) AS NEEDED
Status: CANCELLED
Start: 2020-03-04

## 2020-02-14 RX ORDER — ONDANSETRON 2 MG/ML
8 INJECTION INTRAMUSCULAR; INTRAVENOUS AS NEEDED
Status: CANCELLED | OUTPATIENT
Start: 2020-03-04

## 2020-02-14 RX ORDER — ACETAMINOPHEN 325 MG/1
650 TABLET ORAL AS NEEDED
Status: CANCELLED
Start: 2020-03-04

## 2020-02-14 RX ORDER — HYDROCORTISONE SODIUM SUCCINATE 100 MG/2ML
100 INJECTION, POWDER, FOR SOLUTION INTRAMUSCULAR; INTRAVENOUS AS NEEDED
Status: CANCELLED | OUTPATIENT
Start: 2020-03-04

## 2020-02-14 RX ORDER — EPINEPHRINE 1 MG/ML
0.3 INJECTION, SOLUTION, CONCENTRATE INTRAVENOUS AS NEEDED
Status: CANCELLED | OUTPATIENT
Start: 2020-03-04

## 2020-02-14 RX ADMIN — CYANOCOBALAMIN 1000 MCG: 1000 INJECTION, SOLUTION INTRAMUSCULAR at 13:45

## 2020-02-14 NOTE — PROGRESS NOTES
[facility] Progress Note    Date: 2020    Name: Kimmie Hylton    MRN: [id number]         : 1990    B12 Injection    Ms. Case arrived to [facility] at 1330. Ms. Miguel Lindo was assessed and education was provided. Ms. Case's vitals were reviewed. Visit Vitals  /86 (BP 1 Location: Right arm, BP Patient Position: Sitting)   Pulse 60   Temp 97 °F (36.1 °C)   Resp 18   SpO2 98%   Breastfeeding No       Cyanocobalamin (B12) 1000mcg was administered as ordered SQ in patient's Right upper arm. Ms. Miguel Lindo tolerated well without complaints. Ms. Miguel Lindo was discharged from [place] [address] in stable condition at 1350. She is to return on 19 at  for her next B12 injection appointment.     Radames Mazariegos RN  2020

## 2020-02-26 VITALS
OXYGEN SATURATION: 98 % | SYSTOLIC BLOOD PRESSURE: 136 MMHG | TEMPERATURE: 97 F | HEART RATE: 60 BPM | RESPIRATION RATE: 18 BRPM | DIASTOLIC BLOOD PRESSURE: 86 MMHG

## 2020-03-04 DIAGNOSIS — E53.8 B12 DEFICIENCY: ICD-10-CM

## 2020-03-11 ENCOUNTER — HOSPITAL ENCOUNTER (OUTPATIENT)
Dept: LAB | Age: 30
Discharge: HOME | End: 2020-03-11

## 2020-03-11 ENCOUNTER — HOSPITAL ENCOUNTER (OUTPATIENT)
Dept: INFUSION THERAPY | Age: 30
Discharge: HOME | End: 2020-03-11
Payer: MEDICAID

## 2020-03-11 VITALS
TEMPERATURE: 97.7 F | HEART RATE: 76 BPM | DIASTOLIC BLOOD PRESSURE: 83 MMHG | OXYGEN SATURATION: 100 % | RESPIRATION RATE: 18 BRPM | SYSTOLIC BLOOD PRESSURE: 125 MMHG

## 2020-03-11 DIAGNOSIS — E53.8 B12 DEFICIENCY: Primary | ICD-10-CM

## 2020-03-11 LAB — XX-LABCORP SPECIMEN COL,LCBCF: NORMAL

## 2020-03-11 PROCEDURE — 74011250636 HC RX REV CODE- 250/636: Performed by: INTERNAL MEDICINE

## 2020-03-11 PROCEDURE — 96372 THER/PROPH/DIAG INJ SC/IM: CPT

## 2020-03-11 PROCEDURE — 99001 SPECIMEN HANDLING PT-LAB: CPT

## 2020-03-11 RX ORDER — ONDANSETRON 2 MG/ML
8 INJECTION INTRAMUSCULAR; INTRAVENOUS AS NEEDED
Status: CANCELLED | OUTPATIENT
Start: 2020-04-01

## 2020-03-11 RX ORDER — DIPHENHYDRAMINE HYDROCHLORIDE 50 MG/ML
50 INJECTION, SOLUTION INTRAMUSCULAR; INTRAVENOUS AS NEEDED
Status: CANCELLED
Start: 2020-04-01

## 2020-03-11 RX ORDER — CYANOCOBALAMIN 1000 UG/ML
1000 INJECTION, SOLUTION INTRAMUSCULAR; SUBCUTANEOUS ONCE
Status: COMPLETED | OUTPATIENT
Start: 2020-03-11 | End: 2020-03-11

## 2020-03-11 RX ORDER — HYDROCORTISONE SODIUM SUCCINATE 100 MG/2ML
100 INJECTION, POWDER, FOR SOLUTION INTRAMUSCULAR; INTRAVENOUS AS NEEDED
Status: CANCELLED | OUTPATIENT
Start: 2020-04-01

## 2020-03-11 RX ORDER — ACETAMINOPHEN 325 MG/1
650 TABLET ORAL AS NEEDED
Status: CANCELLED
Start: 2020-04-01

## 2020-03-11 RX ORDER — EPINEPHRINE 1 MG/ML
0.3 INJECTION, SOLUTION, CONCENTRATE INTRAVENOUS AS NEEDED
Status: CANCELLED | OUTPATIENT
Start: 2020-04-01

## 2020-03-11 RX ORDER — CYANOCOBALAMIN 1000 UG/ML
1000 INJECTION, SOLUTION INTRAMUSCULAR; SUBCUTANEOUS ONCE
Status: CANCELLED
Start: 2020-04-01

## 2020-03-11 RX ORDER — ALBUTEROL SULFATE 0.83 MG/ML
2.5 SOLUTION RESPIRATORY (INHALATION) AS NEEDED
Status: CANCELLED
Start: 2020-04-01

## 2020-03-11 RX ADMIN — CYANOCOBALAMIN 1000 MCG: 1000 INJECTION, SOLUTION INTRAMUSCULAR at 15:32

## 2020-03-11 NOTE — PROGRESS NOTES
[facility] Progress Note    Date: 2020    Name: Kaylyn Rich    MRN: [id number]         : 1990    B12 Injection    Ms. Case arrived to [facility] at . Ms. Newton Cooks was assessed and education was provided. Ms. Case's vitals were reviewed. Visit Vitals  /83 (BP 1 Location: Left arm, BP Patient Position: Sitting)   Pulse 76   Temp 97.7 °F (36.5 °C)   Resp 18   SpO2 100%   Breastfeeding No       Cyanocobalamin (B12) 1000mcg was administered as ordered SQ in patient's Right upper arm. Ms. Newton Cooks tolerated well without complaints. Ms. Newton Cooks was discharged from [place] [address] in stable condition at 1540. She is to return on 2020 at  for her next B12 injection appointment.     Johanne Moran, RN, RN  2020  1540

## 2020-03-12 LAB
BASOPHILS # BLD AUTO: 0 X10E3/UL (ref 0–0.2)
BASOPHILS NFR BLD AUTO: 0 %
EOSINOPHIL # BLD AUTO: 0.1 X10E3/UL (ref 0–0.4)
EOSINOPHIL NFR BLD AUTO: 1 %
ERYTHROCYTE [DISTWIDTH] IN BLOOD BY AUTOMATED COUNT: 13.6 % (ref 11.7–15.4)
FERRITIN SERPL-MCNC: 7 NG/ML (ref 15–150)
FOLATE SERPL-MCNC: 5.6 NG/ML
HCT VFR BLD AUTO: 31.8 % (ref 34–46.6)
HGB BLD-MCNC: 10.6 G/DL (ref 11.1–15.9)
IMM GRANULOCYTES # BLD AUTO: 0 X10E3/UL (ref 0–0.1)
IMM GRANULOCYTES NFR BLD AUTO: 0 %
IRON SATN MFR SERPL: 10 % (ref 15–55)
IRON SERPL-MCNC: 40 UG/DL (ref 27–159)
LYMPHOCYTES # BLD AUTO: 2 X10E3/UL (ref 0.7–3.1)
LYMPHOCYTES NFR BLD AUTO: 32 %
MCH RBC QN AUTO: 26.6 PG (ref 26.6–33)
MCHC RBC AUTO-ENTMCNC: 33.3 G/DL (ref 31.5–35.7)
MCV RBC AUTO: 80 FL (ref 79–97)
MONOCYTES # BLD AUTO: 0.8 X10E3/UL (ref 0.1–0.9)
MONOCYTES NFR BLD AUTO: 12 %
NEUTROPHILS # BLD AUTO: 3.4 X10E3/UL (ref 1.4–7)
NEUTROPHILS NFR BLD AUTO: 55 %
PLATELET # BLD AUTO: 261 X10E3/UL (ref 150–450)
RBC # BLD AUTO: 3.98 X10E6/UL (ref 3.77–5.28)
TIBC SERPL-MCNC: 411 UG/DL (ref 250–450)
TSH SERPL DL<=0.005 MIU/L-ACNC: 1.27 UIU/ML (ref 0.45–4.5)
UIBC SERPL-MCNC: 371 UG/DL (ref 131–425)
VIT B12 SERPL-MCNC: >2000 PG/ML (ref 232–1245)
WBC # BLD AUTO: 6.2 X10E3/UL (ref 3.4–10.8)

## 2020-03-19 ENCOUNTER — TELEPHONE (OUTPATIENT)
Dept: ONCOLOGY | Age: 30
End: 2020-03-19

## 2020-04-01 DIAGNOSIS — E53.8 B12 DEFICIENCY: ICD-10-CM

## 2020-04-08 ENCOUNTER — HOSPITAL ENCOUNTER (OUTPATIENT)
Dept: INFUSION THERAPY | Age: 30
Discharge: HOME | End: 2020-04-08
Payer: MEDICAID

## 2020-04-08 VITALS
HEART RATE: 64 BPM | RESPIRATION RATE: 18 BRPM | SYSTOLIC BLOOD PRESSURE: 130 MMHG | OXYGEN SATURATION: 100 % | DIASTOLIC BLOOD PRESSURE: 87 MMHG | TEMPERATURE: 97.9 F

## 2020-04-08 DIAGNOSIS — E53.8 B12 DEFICIENCY: Primary | ICD-10-CM

## 2020-04-08 PROCEDURE — 74011250636 HC RX REV CODE- 250/636: Performed by: INTERNAL MEDICINE

## 2020-04-08 PROCEDURE — 96372 THER/PROPH/DIAG INJ SC/IM: CPT

## 2020-04-08 RX ORDER — CYANOCOBALAMIN 1000 UG/ML
1000 INJECTION, SOLUTION INTRAMUSCULAR; SUBCUTANEOUS ONCE
Status: CANCELLED
Start: 2020-04-29

## 2020-04-08 RX ORDER — EPINEPHRINE 1 MG/ML
0.3 INJECTION, SOLUTION, CONCENTRATE INTRAVENOUS AS NEEDED
Status: DISPENSED | OUTPATIENT
Start: 2020-04-08 | End: 2020-04-08

## 2020-04-08 RX ORDER — DIPHENHYDRAMINE HYDROCHLORIDE 50 MG/ML
50 INJECTION, SOLUTION INTRAMUSCULAR; INTRAVENOUS AS NEEDED
Status: ACTIVE | OUTPATIENT
Start: 2020-04-08 | End: 2020-04-08

## 2020-04-08 RX ORDER — ONDANSETRON 2 MG/ML
8 INJECTION INTRAMUSCULAR; INTRAVENOUS AS NEEDED
Status: CANCELLED | OUTPATIENT
Start: 2020-04-29

## 2020-04-08 RX ORDER — EPINEPHRINE 1 MG/ML
0.3 INJECTION, SOLUTION, CONCENTRATE INTRAVENOUS AS NEEDED
Status: CANCELLED | OUTPATIENT
Start: 2020-04-29

## 2020-04-08 RX ORDER — HYDROCORTISONE SODIUM SUCCINATE 100 MG/2ML
100 INJECTION, POWDER, FOR SOLUTION INTRAMUSCULAR; INTRAVENOUS AS NEEDED
Status: CANCELLED | OUTPATIENT
Start: 2020-04-29

## 2020-04-08 RX ORDER — ALBUTEROL SULFATE 0.83 MG/ML
2.5 SOLUTION RESPIRATORY (INHALATION) AS NEEDED
Status: DISPENSED | OUTPATIENT
Start: 2020-04-08 | End: 2020-04-08

## 2020-04-08 RX ORDER — CYANOCOBALAMIN 1000 UG/ML
1000 INJECTION, SOLUTION INTRAMUSCULAR; SUBCUTANEOUS ONCE
Status: COMPLETED | OUTPATIENT
Start: 2020-04-08 | End: 2020-04-08

## 2020-04-08 RX ORDER — ALBUTEROL SULFATE 0.83 MG/ML
2.5 SOLUTION RESPIRATORY (INHALATION) AS NEEDED
Status: CANCELLED
Start: 2020-04-29

## 2020-04-08 RX ORDER — DIPHENHYDRAMINE HYDROCHLORIDE 50 MG/ML
50 INJECTION, SOLUTION INTRAMUSCULAR; INTRAVENOUS AS NEEDED
Status: CANCELLED
Start: 2020-04-29

## 2020-04-08 RX ORDER — HYDROCORTISONE SODIUM SUCCINATE 100 MG/2ML
100 INJECTION, POWDER, FOR SOLUTION INTRAMUSCULAR; INTRAVENOUS AS NEEDED
Status: ACTIVE | OUTPATIENT
Start: 2020-04-08 | End: 2020-04-08

## 2020-04-08 RX ORDER — ACETAMINOPHEN 325 MG/1
650 TABLET ORAL AS NEEDED
Status: CANCELLED
Start: 2020-04-29

## 2020-04-08 RX ADMIN — CYANOCOBALAMIN 1000 MCG: 1000 INJECTION, SOLUTION INTRAMUSCULAR at 09:30

## 2020-04-08 NOTE — PROGRESS NOTES
[facility] Progress Note    Date: 2020    Name: Anuradha Shin    MRN: [id number]         : 1990    B12 Injection    Ms. Case arrived to [facility]. Ms. Aileen Ascencio was assessed and education was provided. Ms. Case's vitals were reviewed. Visit Vitals  /87 (BP 1 Location: Left arm, BP Patient Position: Sitting)   Pulse 64   Temp 97.9 °F (36.6 °C)   Resp 18   SpO2 100%       Cyanocobalamin (B12) 1000mcg was administered as ordered SQ in patient's Right upper arm. Ms. Aileen Ascencio tolerated well without complaints. Ms. Aileen Ascencio was discharged from [place] [address] in stable condition at [address] [place] [address]. She is to return on 2020 at  for her next B12 injection appointment.     Rebecca Kendall RN  2020  4910

## 2020-04-29 DIAGNOSIS — E53.8 B12 DEFICIENCY: ICD-10-CM

## 2020-05-06 ENCOUNTER — HOSPITAL ENCOUNTER (OUTPATIENT)
Dept: INFUSION THERAPY | Age: 30
Discharge: HOME | End: 2020-05-06
Payer: MEDICAID

## 2020-05-06 VITALS
TEMPERATURE: 98.2 F | HEART RATE: 69 BPM | DIASTOLIC BLOOD PRESSURE: 85 MMHG | OXYGEN SATURATION: 100 % | RESPIRATION RATE: 18 BRPM | SYSTOLIC BLOOD PRESSURE: 134 MMHG

## 2020-05-06 DIAGNOSIS — E53.8 B12 DEFICIENCY: Primary | ICD-10-CM

## 2020-05-06 PROCEDURE — 96372 THER/PROPH/DIAG INJ SC/IM: CPT

## 2020-05-06 PROCEDURE — 74011250636 HC RX REV CODE- 250/636: Performed by: INTERNAL MEDICINE

## 2020-05-06 RX ORDER — CYANOCOBALAMIN 1000 UG/ML
1000 INJECTION, SOLUTION INTRAMUSCULAR; SUBCUTANEOUS ONCE
Status: CANCELLED
Start: 2020-05-27

## 2020-05-06 RX ORDER — CYANOCOBALAMIN 1000 UG/ML
1000 INJECTION, SOLUTION INTRAMUSCULAR; SUBCUTANEOUS ONCE
Status: COMPLETED | OUTPATIENT
Start: 2020-05-06 | End: 2020-05-06

## 2020-05-06 RX ORDER — DIPHENHYDRAMINE HYDROCHLORIDE 50 MG/ML
50 INJECTION, SOLUTION INTRAMUSCULAR; INTRAVENOUS AS NEEDED
Status: CANCELLED
Start: 2020-05-27

## 2020-05-06 RX ORDER — HYDROCORTISONE SODIUM SUCCINATE 100 MG/2ML
100 INJECTION, POWDER, FOR SOLUTION INTRAMUSCULAR; INTRAVENOUS AS NEEDED
Status: CANCELLED | OUTPATIENT
Start: 2020-05-27

## 2020-05-06 RX ORDER — EPINEPHRINE 1 MG/ML
0.3 INJECTION, SOLUTION, CONCENTRATE INTRAVENOUS AS NEEDED
Status: CANCELLED | OUTPATIENT
Start: 2020-05-27

## 2020-05-06 RX ORDER — ACETAMINOPHEN 325 MG/1
650 TABLET ORAL AS NEEDED
Status: CANCELLED
Start: 2020-05-27

## 2020-05-06 RX ORDER — ONDANSETRON 2 MG/ML
8 INJECTION INTRAMUSCULAR; INTRAVENOUS AS NEEDED
Status: CANCELLED | OUTPATIENT
Start: 2020-05-27

## 2020-05-06 RX ORDER — ALBUTEROL SULFATE 0.83 MG/ML
2.5 SOLUTION RESPIRATORY (INHALATION) AS NEEDED
Status: CANCELLED
Start: 2020-05-27

## 2020-05-06 RX ADMIN — CYANOCOBALAMIN 1000 MCG: 1000 INJECTION, SOLUTION INTRAMUSCULAR at 15:41

## 2020-05-06 NOTE — PROGRESS NOTES
[facility] Progress Note    Date: May 6, 2020    Name: Alison Stovall    MRN: [id number]         : 1990    B12 Injection    Ms. Case arrived to [facility] at 66 91 21. Ms. Alpesh Gramajo was assessed and education was provided. Ms. Case's vitals were reviewed. Visit Vitals  /85 (BP 1 Location: Right arm, BP Patient Position: Sitting)   Pulse 69   Temp 98.2 °F (36.8 °C)   Resp 18   SpO2 100%   Breastfeeding No       Cyanocobalamin (B12) 1000mcg was administered as ordered SQ in patient's Right upper arm. Ms. Alpesh Gramajo tolerated well without complaints. Ms. Alpesh Gramajo was discharged from [place] [address] in stable condition at 1550. She is to return on 6/3/2020 at 1500 for her next B12 injection appointment.     Lianet Reyes RN  May 6, 2020  1550

## 2020-05-27 DIAGNOSIS — E53.8 B12 DEFICIENCY: ICD-10-CM

## 2020-06-03 ENCOUNTER — HOSPITAL ENCOUNTER (OUTPATIENT)
Dept: INFUSION THERAPY | Age: 30
Discharge: HOME | End: 2020-06-03
Payer: MEDICAID

## 2020-06-03 VITALS
SYSTOLIC BLOOD PRESSURE: 117 MMHG | TEMPERATURE: 98.4 F | RESPIRATION RATE: 18 BRPM | HEART RATE: 80 BPM | DIASTOLIC BLOOD PRESSURE: 83 MMHG | OXYGEN SATURATION: 98 %

## 2020-06-03 DIAGNOSIS — E53.8 B12 DEFICIENCY: Primary | ICD-10-CM

## 2020-06-03 PROCEDURE — 74011250636 HC RX REV CODE- 250/636: Performed by: INTERNAL MEDICINE

## 2020-06-03 PROCEDURE — 96372 THER/PROPH/DIAG INJ SC/IM: CPT

## 2020-06-03 RX ORDER — DIPHENHYDRAMINE HYDROCHLORIDE 50 MG/ML
50 INJECTION, SOLUTION INTRAMUSCULAR; INTRAVENOUS AS NEEDED
Status: CANCELLED
Start: 2020-06-24

## 2020-06-03 RX ORDER — CYANOCOBALAMIN 1000 UG/ML
1000 INJECTION, SOLUTION INTRAMUSCULAR; SUBCUTANEOUS ONCE
Status: CANCELLED
Start: 2020-06-24

## 2020-06-03 RX ORDER — ONDANSETRON 2 MG/ML
8 INJECTION INTRAMUSCULAR; INTRAVENOUS AS NEEDED
Status: CANCELLED | OUTPATIENT
Start: 2020-06-24

## 2020-06-03 RX ORDER — HYDROCORTISONE SODIUM SUCCINATE 100 MG/2ML
100 INJECTION, POWDER, FOR SOLUTION INTRAMUSCULAR; INTRAVENOUS AS NEEDED
Status: CANCELLED | OUTPATIENT
Start: 2020-06-24

## 2020-06-03 RX ORDER — ALBUTEROL SULFATE 0.83 MG/ML
2.5 SOLUTION RESPIRATORY (INHALATION) AS NEEDED
Status: CANCELLED
Start: 2020-06-24

## 2020-06-03 RX ORDER — CYANOCOBALAMIN 1000 UG/ML
1000 INJECTION, SOLUTION INTRAMUSCULAR; SUBCUTANEOUS ONCE
Status: COMPLETED | OUTPATIENT
Start: 2020-06-03 | End: 2020-06-03

## 2020-06-03 RX ORDER — EPINEPHRINE 1 MG/ML
0.3 INJECTION, SOLUTION, CONCENTRATE INTRAVENOUS AS NEEDED
Status: CANCELLED | OUTPATIENT
Start: 2020-06-24

## 2020-06-03 RX ORDER — ACETAMINOPHEN 325 MG/1
650 TABLET ORAL AS NEEDED
Status: CANCELLED
Start: 2020-06-24

## 2020-06-03 RX ADMIN — CYANOCOBALAMIN 1000 MCG: 1000 INJECTION, SOLUTION INTRAMUSCULAR at 10:14

## 2020-06-03 NOTE — PROGRESS NOTES
[facility] Progress Note    Date: Silvia 3, 2020    Name: Layotn Workman    MRN: [id number]         : 1990    B12 Injection    Ms. Case arrived to [facility] at 1010. Ms. Vishal Moran was assessed and education was provided. Ms. Case's vitals were reviewed. Visit Vitals  /83 (BP 1 Location: Right arm, BP Patient Position: Sitting)   Pulse 80   Temp 98.4 °F (36.9 °C)   Resp 18   SpO2 98%       Cyanocobalamin (B12) 1000mcg was administered as ordered SQ in patient's right upper arm. Ms. Vishal Moran tolerated well without complaints. Ms. Vishal Moran was discharged from [place] [address] in stable condition at 1020. She is to return on 2020 at  for her next B12 injection appointment.     Azucena Webb RN  Silvia 3, 2020  1020

## 2020-06-17 ENCOUNTER — TELEPHONE (OUTPATIENT)
Dept: ONCOLOGY | Age: 30
End: 2020-06-17

## 2020-06-18 ENCOUNTER — VIRTUAL VISIT (OUTPATIENT)
Dept: ONCOLOGY | Age: 30
End: 2020-06-18

## 2020-06-18 DIAGNOSIS — Z98.84 GASTRIC BYPASS STATUS FOR OBESITY: ICD-10-CM

## 2020-06-18 DIAGNOSIS — E53.8 B12 DEFICIENCY: ICD-10-CM

## 2020-06-18 DIAGNOSIS — D50.9 IRON DEFICIENCY ANEMIA, UNSPECIFIED IRON DEFICIENCY ANEMIA TYPE: Primary | ICD-10-CM

## 2020-06-18 DIAGNOSIS — N92.4 EXCESSIVE BLEEDING IN PREMENOPAUSAL PERIOD: ICD-10-CM

## 2020-06-18 DIAGNOSIS — R53.82 CHRONIC FATIGUE: ICD-10-CM

## 2020-06-18 RX ORDER — ACETAMINOPHEN, DIPHENHYDRAMINE HCL, PHENYLEPHRINE HCL 325; 25; 5 MG/1; MG/1; MG/1
5000 TABLET ORAL DAILY
Qty: 90 TAB | Refills: 3 | Status: SHIPPED | OUTPATIENT
Start: 2020-06-18 | End: 2020-09-16

## 2020-06-18 NOTE — PROGRESS NOTES
Identified pt with two pt identifiers(name and ). Reviewed record in preparation for visit and have obtained necessary documentation. Chief Complaint   Patient presents with    Fatigue     more fatigue     Anemia    Vitamin B12 Deficiency        Health Maintenance Due   Topic    Pneumococcal 0-64 years (1 of 1 - PPSV23)    PAP AKA CERVICAL CYTOLOGY        Coordination of Care Questionnaire:  :   1) Have you been to an emergency room, urgent care, or hospitalized since your last visit? If yes, where when, and reason for visit? no       2. Have seen or consulted any other health care provider since your last visit? If yes, where when, and reason for visit? NO      3) Do you have an Advanced Directive/ Living Will in place? NO  If yes, do we have a copy on file NO  If no, would you like information NO      Learning Assessment 7/10/2019   PRIMARY LEARNER Patient   BARRIERS PRIMARY LEARNER -   PRIMARY LANGUAGE ENGLISH   LEARNER PREFERENCE PRIMARY READING     -   ANSWERED BY self   RELATIONSHIP SELF        3 most recent PHQ Screens 2020   Little interest or pleasure in doing things Not at all   Feeling down, depressed, irritable, or hopeless Not at all   Total Score PHQ 2 0        Abuse Screening Questionnaire 2020   Do you ever feel afraid of your partner? N   Are you in a relationship with someone who physically or mentally threatens you? N   Is it safe for you to go home? Y        Fall Risk Assessment, last 12 mths 2020   Able to walk? Yes   Fall in past 12 months?  No

## 2020-06-18 NOTE — PROGRESS NOTES
[address] is a 26 y.o. female who was seen by synchronous (real-time) telephone technology on 6/18/2020. Consent: 46Jarett Vázquez, who was seen by synchronous (real-time) telephone technology, and/or her healthcare decision maker, is aware that this patient-initiated, Telehealth encounter on 6/18/2020 is a billable service, with coverage as determined by her insurance carrier. She is aware that she may receive a bill and has provided verbal consent to proceed: Yes. Assessment & Plan:   Diagnoses and all orders for this visit:    1. Iron deficiency anemia, unspecified iron deficiency anemia type  -     CBC WITH AUTOMATED DIFF; Future  -     FERRITIN; Future  -     IRON PROFILE; Future  -     METABOLIC PANEL, COMPREHENSIVE; Future    2. Excessive bleeding in premenopausal period  -     CBC WITH AUTOMATED DIFF; Future  -     FERRITIN; Future  -     IRON PROFILE; Future  -     METABOLIC PANEL, COMPREHENSIVE; Future    3. Chronic fatigue    4. Gastric bypass status for obesity      The complexity of medical decision making for this visit is moderate       1. Iron deficiency anemia  Iron deficiency recurs again and as of lab of 3/11/2020, WBC 6.2, H&H 10.6/31.8, platelet 852. Transferrin saturation 10%, ferritin 7, normal B12 folate and TSH. LMP=6/14/20     2. Vitamin B12 deficiency:   Patient has vitamin B12 deficiency since her methylmalonic acid level was elevated at 388 in August 2019. She is currently on replacement. Her last B12 shot was on 6/3/2020. Recheck levels. 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     3. Menorrhagia: Needs OBGYN intervention asap. On Lysteda but not helping. Patient says she stopped taking them.      4. H/o gastric bypass  S/P IV iron   Continue B12 replacement.     RTC in 3 months  I spent at least 25 minutes on this visit with this established patient.   712  Subjective:   [address] is a 26 y.o. female with history of iron deficiency anemia, status post IV iron infusion with Injectafer x2 completed on 5/29/2019, here for follow-up.     In the interim patient was diagnosed on 11/13/2019 of right upper lobe pneumonia. She is currently on Z-Florian.     Today on review of system she denies any fevers, chills, shortness of breath, nausea vomiting or abdominal pain.  No focal neurologic deficit.  No melena or bright red blood per rectum.  No pica or pagophagia. Reports some chronic knee pain which is better. Remaining 12 point of view of system negative.  ECOG performance status 0.  Independent with ADLs and IADLs.      Prior to Admission medications    Medication Sig Start Date End Date Taking? Authorizing Provider   thiamine HCL (VITAMIN B-1) 100 mg tablet Take  by mouth daily. Provider, Historical   calcium citrate-vitamin d2 1,500-200 mg-unit tab Take  by mouth. Provider, Historical   montelukast (SINGULAIR) 10 mg tablet Take 10 mg by mouth daily. Provider, Historical   multivitamin with iron (FLINTSTONES) chewable tablet Take 1 tablet by mouth two (2) times a day. Provider, Historical   Cholecalciferol, Vitamin D3, (VITAMIN D3) 2,000 unit cap capsule Take  by mouth two (2) times a day. Provider, Historical   cyanocobalamin (VITAMIN B12) 500 mcg tablet Take 500 mcg by mouth two (2) times a day. Provider, Historical   albuterol (PROAIR HFA) 90 mcg/actuation inhaler Take 2 Puffs by inhalation as needed.     Provider, Historical     Allergies   Allergen Reactions    Pollen Extracts Other (comments)     Nasal symptoms       Patient Active Problem List   Diagnosis Code    Snoring R06.83    Morbid obesity with BMI of 50.0-59.9, adult ([facility]) E66.01, Z68.43    Unspecified sleep apnea G47.30    Asthma J45.909    B12 deficiency E53.8    Chronic fatigue R53.82    Iron deficiency anemia D50.9    Postoperative malabsorption K91.2    Gastric bypass status for obesity Z98.84    Anxiety and depression F41.9, F32.9    Hypovitaminosis D E55.9    Obesity (BMI 30-39. 9) E66.9    Excessive bleeding in premenopausal period N92.4     Patient Active Problem List    Diagnosis Date Noted    Excessive bleeding in premenopausal period 2019    Hypovitaminosis D 2017    Obesity (BMI 30-39.9) 2017    B12 deficiency 2016    Chronic fatigue 2016    Iron deficiency anemia 2016    Postoperative malabsorption 2016    Gastric bypass status for obesity 2016    Anxiety and depression 2016    Morbid obesity with BMI of 50.0-59.9, adult ([facility] [address]     Unspecified sleep apnea     Asthma     Snoring 2014     Current Outpatient Medications   Medication Sig Dispense Refill    thiamine HCL (VITAMIN B-1) 100 mg tablet Take  by mouth daily.  calcium citrate-vitamin d2 1,500-200 mg-unit tab Take  by mouth.  montelukast (SINGULAIR) 10 mg tablet Take 10 mg by mouth daily.  multivitamin with iron (FLINTSTONES) chewable tablet Take 1 tablet by mouth two (2) times a day.  Cholecalciferol, Vitamin D3, (VITAMIN D3) 2,000 unit cap capsule Take  by mouth two (2) times a day.  cyanocobalamin (VITAMIN B12) 500 mcg tablet Take 500 mcg by mouth two (2) times a day.  albuterol (PROAIR HFA) 90 mcg/actuation inhaler Take 2 Puffs by inhalation as needed.        Allergies   Allergen Reactions    Pollen Extracts Other (comments)     Nasal symptoms     Past Medical History:   Diagnosis Date    Anemia     Arrhythmia     palpitations/pt denies    Asthma     Depression     Dizziness     daily     Joint pain     Morbid obesity with BMI of 50.0-59.9, adult ([facility])     Shortness of breath     Sleep apnea     Unspecified sleep apnea      Past Surgical History:   Procedure Laterality Date    ABDOMEN SURGERY PROC UNLISTED  2014    gastric bypass with repair hiatal hernia    DELIVERY       x 3    HX GI      gastric bypass    HX GYN      c section x 4    HX ORTHOPAEDIC  2017    R ring finger fx repair, due to scar tissue     Family History   Problem Relation Age of Onset    No Known Problems Father     Obesity Mother     Asthma Mother      Social History     Tobacco Use    Smoking status: Former Smoker     Last attempt to quit: 2014     Years since quittin.0    Smokeless tobacco: Never Used   Substance Use Topics    Alcohol use: Not Currently     Alcohol/week: 1.7 standard drinks     Types: 2 Standard drinks or equivalent per week     Comment: occassionally       ROS      Objective: There were no vitals taken for this visit. Additional exam findings: We discussed the expected course, resolution and complications of the diagnosis(es) in detail. Medication risks, benefits, costs, interactions, and alternatives were discussed as indicated. I advised her to contact the office if her condition worsens, changes or fails to improve as anticipated. She expressed understanding with the diagnosis(es) and plan. [address] is a 26 y.o. female who was evaluated by a telephone visit encounter for concerns as above. Patient identification was verified prior to start of the visit. A caregiver was present when appropriate. Due to this being a TeleHealth encounter (During OXJXT-00 public health emergency), evaluation of the following organ systems was limited: Vitals/Constitutional/EENT/Resp/CV/GI//MS/Neuro/Skin/Heme-Lymph-Imm. Pursuant to the emergency declaration under the [facility], [facility]5 waiver authority and the [employer] and [employer] Act, this Virtual  Visit was conducted, with patient's (and/or legal guardian's) consent, to reduce the patient's risk of exposure to COVID-19 and provide necessary medical care. Services were provided through a telephone synchronous discussion virtually to substitute for in-person clinic visit.   Patient and provider were located at their individual homes.       Frances Kim MD

## 2020-06-18 NOTE — Clinical Note
Please stop B12 shots. Switch to SL Please give IV injectaferx3 Please draw CBC with dii, CMP, and ferritin /iron profile when she comes for IV infusion.  Thx

## 2020-06-19 RX ORDER — SODIUM CHLORIDE 9 MG/ML
25 INJECTION, SOLUTION INTRAVENOUS CONTINUOUS
Status: CANCELLED | OUTPATIENT
Start: 2020-06-25

## 2020-06-19 RX ORDER — HEPARIN 100 UNIT/ML
300-500 SYRINGE INTRAVENOUS AS NEEDED
Status: CANCELLED
Start: 2020-06-25

## 2020-06-19 RX ORDER — SODIUM CHLORIDE 0.9 % (FLUSH) 0.9 %
10 SYRINGE (ML) INJECTION AS NEEDED
Status: CANCELLED
Start: 2020-06-25

## 2020-06-19 RX ORDER — HYDROCORTISONE SODIUM SUCCINATE 100 MG/2ML
100 INJECTION, POWDER, FOR SOLUTION INTRAMUSCULAR; INTRAVENOUS AS NEEDED
Status: CANCELLED | OUTPATIENT
Start: 2020-06-25

## 2020-06-19 RX ORDER — DIPHENHYDRAMINE HYDROCHLORIDE 50 MG/ML
25 INJECTION, SOLUTION INTRAMUSCULAR; INTRAVENOUS AS NEEDED
Status: CANCELLED
Start: 2020-06-25

## 2020-06-19 RX ORDER — EPINEPHRINE 1 MG/ML
0.3 INJECTION, SOLUTION, CONCENTRATE INTRAVENOUS AS NEEDED
Status: CANCELLED | OUTPATIENT
Start: 2020-06-25

## 2020-06-19 RX ORDER — ALBUTEROL SULFATE 0.83 MG/ML
2.5 SOLUTION RESPIRATORY (INHALATION) AS NEEDED
Status: CANCELLED
Start: 2020-06-25

## 2020-06-19 RX ORDER — SODIUM CHLORIDE 9 MG/ML
10 INJECTION INTRAMUSCULAR; INTRAVENOUS; SUBCUTANEOUS AS NEEDED
Status: CANCELLED | OUTPATIENT
Start: 2020-06-25

## 2020-06-19 RX ORDER — ACETAMINOPHEN 325 MG/1
650 TABLET ORAL AS NEEDED
Status: CANCELLED
Start: 2020-06-25

## 2020-06-19 RX ORDER — ONDANSETRON 2 MG/ML
8 INJECTION INTRAMUSCULAR; INTRAVENOUS AS NEEDED
Status: CANCELLED | OUTPATIENT
Start: 2020-06-25

## 2020-06-19 RX ORDER — DIPHENHYDRAMINE HYDROCHLORIDE 50 MG/ML
50 INJECTION, SOLUTION INTRAMUSCULAR; INTRAVENOUS AS NEEDED
Status: CANCELLED
Start: 2020-06-25

## 2020-06-24 DIAGNOSIS — E53.8 B12 DEFICIENCY: ICD-10-CM

## 2020-06-25 DIAGNOSIS — E53.8 B12 DEFICIENCY: ICD-10-CM

## 2020-06-30 ENCOUNTER — HOSPITAL ENCOUNTER (OUTPATIENT)
Dept: INFUSION THERAPY | Age: 30
Discharge: HOME | End: 2020-06-30
Payer: MEDICAID

## 2020-06-30 DIAGNOSIS — D50.9 IRON DEFICIENCY ANEMIA, UNSPECIFIED IRON DEFICIENCY ANEMIA TYPE: ICD-10-CM

## 2020-06-30 DIAGNOSIS — N92.4 EXCESSIVE BLEEDING IN PREMENOPAUSAL PERIOD: ICD-10-CM

## 2020-06-30 LAB
ALBUMIN SERPL-MCNC: 3.8 G/DL (ref 3.4–5)
ALBUMIN/GLOB SERPL: 1 {RATIO} (ref 0.8–1.7)
ALP SERPL-CCNC: 66 U/L (ref 45–117)
ALT SERPL-CCNC: 23 U/L (ref 13–56)
ANION GAP SERPL CALC-SCNC: 0 MMOL/L (ref 3–18)
AST SERPL-CCNC: 21 U/L (ref 10–38)
BASO+EOS+MONOS # BLD AUTO: 0.7 K/UL (ref 0–2.3)
BASO+EOS+MONOS NFR BLD AUTO: 11 % (ref 0.1–17)
BASOPHILS # BLD: 0 K/UL (ref 0–0.1)
BASOPHILS NFR BLD: 1 % (ref 0–2)
BILIRUB SERPL-MCNC: 0.3 MG/DL (ref 0.2–1)
BUN SERPL-MCNC: 11 MG/DL (ref 7–18)
BUN/CREAT SERPL: 16 (ref 12–20)
CALCIUM SERPL-MCNC: 8.6 MG/DL (ref 8.5–10.1)
CHLORIDE SERPL-SCNC: 107 MMOL/L (ref 100–111)
CO2 SERPL-SCNC: 29 MMOL/L (ref 21–32)
CREAT SERPL-MCNC: 0.68 MG/DL (ref 0.6–1.3)
DIFFERENTIAL METHOD BLD: ABNORMAL
DIFFERENTIAL METHOD BLD: ABNORMAL
EOSINOPHIL # BLD: 0 K/UL (ref 0–0.4)
EOSINOPHIL NFR BLD: 1 % (ref 0–5)
ERYTHROCYTE [DISTWIDTH] IN BLOOD BY AUTOMATED COUNT: 15.3 % (ref 11.5–14.5)
ERYTHROCYTE [DISTWIDTH] IN BLOOD BY AUTOMATED COUNT: 16.4 % (ref 11.6–14.5)
FERRITIN SERPL-MCNC: 4 NG/ML (ref 8–388)
GLOBULIN SER CALC-MCNC: 3.9 G/DL (ref 2–4)
GLUCOSE SERPL-MCNC: 103 MG/DL (ref 74–99)
HCT VFR BLD AUTO: 33.6 % (ref 36–48)
HCT VFR BLD AUTO: 35.1 % (ref 35–45)
HGB BLD-MCNC: 10.3 G/DL (ref 12–16)
HGB BLD-MCNC: 11.5 G/DL (ref 12–16)
IRON SATN MFR SERPL: 6 % (ref 20–50)
IRON SERPL-MCNC: 30 UG/DL (ref 50–175)
LYMPHOCYTES # BLD: 0.5 K/UL (ref 0.9–3.6)
LYMPHOCYTES # BLD: 2.1 K/UL (ref 1.1–5.9)
LYMPHOCYTES NFR BLD: 13 % (ref 21–52)
LYMPHOCYTES NFR BLD: 34 % (ref 14–44)
MCH RBC QN AUTO: 24.5 PG (ref 25–35)
MCH RBC QN AUTO: 32.3 PG (ref 24–34)
MCHC RBC AUTO-ENTMCNC: 30.7 G/DL (ref 31–37)
MCHC RBC AUTO-ENTMCNC: 32.8 G/DL (ref 31–37)
MCV RBC AUTO: 80 FL (ref 78–102)
MCV RBC AUTO: 98.6 FL (ref 74–97)
MONOCYTES # BLD: 1.1 K/UL (ref 0.05–1.2)
MONOCYTES NFR BLD: 28 % (ref 3–10)
NEUTS SEG # BLD: 2.2 K/UL (ref 1.8–8)
NEUTS SEG # BLD: 3.4 K/UL (ref 1.8–9.5)
NEUTS SEG NFR BLD: 56 % (ref 40–70)
NEUTS SEG NFR BLD: 57 % (ref 40–73)
PLATELET # BLD AUTO: 260 K/UL (ref 140–440)
PLATELET # BLD AUTO: 339 K/UL (ref 135–420)
PMV BLD AUTO: 8.6 FL (ref 9.2–11.8)
POTASSIUM SERPL-SCNC: 3.8 MMOL/L (ref 3.5–5.5)
PROT SERPL-MCNC: 7.7 G/DL (ref 6.4–8.2)
RBC # BLD AUTO: 3.56 M/UL (ref 4.2–5.3)
RBC # BLD AUTO: 4.2 M/UL (ref 4.1–5.1)
SODIUM SERPL-SCNC: 136 MMOL/L (ref 136–145)
TIBC SERPL-MCNC: 513 UG/DL (ref 250–450)
WBC # BLD AUTO: 3.9 K/UL (ref 4.6–13.2)
WBC # BLD AUTO: 6.2 K/UL (ref 4.5–13)

## 2020-06-30 PROCEDURE — 83540 ASSAY OF IRON: CPT

## 2020-06-30 PROCEDURE — 82728 ASSAY OF FERRITIN: CPT

## 2020-06-30 PROCEDURE — 85025 COMPLETE CBC W/AUTO DIFF WBC: CPT

## 2020-06-30 PROCEDURE — 80053 COMPREHEN METABOLIC PANEL: CPT

## 2020-07-01 ENCOUNTER — HOSPITAL ENCOUNTER (OUTPATIENT)
Dept: INFUSION THERAPY | Age: 30
End: 2020-07-01

## 2020-07-01 ENCOUNTER — APPOINTMENT (OUTPATIENT)
Dept: INFUSION THERAPY | Age: 30
End: 2020-07-01

## 2020-07-02 DIAGNOSIS — E53.8 B12 DEFICIENCY: ICD-10-CM

## 2020-07-08 ENCOUNTER — HOSPITAL ENCOUNTER (OUTPATIENT)
Dept: INFUSION THERAPY | Age: 30
End: 2020-07-08

## 2020-07-15 ENCOUNTER — HOSPITAL ENCOUNTER (OUTPATIENT)
Dept: INFUSION THERAPY | Age: 30
Discharge: HOME | End: 2020-07-15
Payer: MEDICAID

## 2020-07-15 VITALS
SYSTOLIC BLOOD PRESSURE: 119 MMHG | TEMPERATURE: 97.3 F | OXYGEN SATURATION: 99 % | DIASTOLIC BLOOD PRESSURE: 86 MMHG | HEART RATE: 65 BPM | RESPIRATION RATE: 18 BRPM

## 2020-07-15 DIAGNOSIS — E53.8 B12 DEFICIENCY: Primary | ICD-10-CM

## 2020-07-15 PROCEDURE — 96365 THER/PROPH/DIAG IV INF INIT: CPT

## 2020-07-15 PROCEDURE — 74011250636 HC RX REV CODE- 250/636: Performed by: INTERNAL MEDICINE

## 2020-07-15 RX ORDER — ALBUTEROL SULFATE 0.83 MG/ML
2.5 SOLUTION RESPIRATORY (INHALATION) AS NEEDED
Status: CANCELLED
Start: 2020-07-16

## 2020-07-15 RX ORDER — SODIUM CHLORIDE 0.9 % (FLUSH) 0.9 %
10 SYRINGE (ML) INJECTION AS NEEDED
Status: DISPENSED | OUTPATIENT
Start: 2020-07-15 | End: 2020-07-15

## 2020-07-15 RX ORDER — DIPHENHYDRAMINE HYDROCHLORIDE 50 MG/ML
25 INJECTION, SOLUTION INTRAMUSCULAR; INTRAVENOUS AS NEEDED
Status: CANCELLED
Start: 2020-07-16

## 2020-07-15 RX ORDER — ONDANSETRON 2 MG/ML
8 INJECTION INTRAMUSCULAR; INTRAVENOUS AS NEEDED
Status: CANCELLED | OUTPATIENT
Start: 2020-07-16

## 2020-07-15 RX ORDER — DIPHENHYDRAMINE HYDROCHLORIDE 50 MG/ML
50 INJECTION, SOLUTION INTRAMUSCULAR; INTRAVENOUS AS NEEDED
Status: CANCELLED
Start: 2020-07-16

## 2020-07-15 RX ORDER — SODIUM CHLORIDE 9 MG/ML
10 INJECTION INTRAMUSCULAR; INTRAVENOUS; SUBCUTANEOUS AS NEEDED
Status: CANCELLED | OUTPATIENT
Start: 2020-07-16

## 2020-07-15 RX ORDER — HEPARIN 100 UNIT/ML
300-500 SYRINGE INTRAVENOUS AS NEEDED
Status: CANCELLED
Start: 2020-07-16

## 2020-07-15 RX ORDER — EPINEPHRINE 1 MG/ML
0.3 INJECTION, SOLUTION, CONCENTRATE INTRAVENOUS AS NEEDED
Status: CANCELLED | OUTPATIENT
Start: 2020-07-16

## 2020-07-15 RX ORDER — SODIUM CHLORIDE 9 MG/ML
25 INJECTION, SOLUTION INTRAVENOUS CONTINUOUS
Status: DISPENSED | OUTPATIENT
Start: 2020-07-15 | End: 2020-07-15

## 2020-07-15 RX ORDER — HYDROCORTISONE SODIUM SUCCINATE 100 MG/2ML
100 INJECTION, POWDER, FOR SOLUTION INTRAMUSCULAR; INTRAVENOUS AS NEEDED
Status: CANCELLED | OUTPATIENT
Start: 2020-07-16

## 2020-07-15 RX ORDER — SODIUM CHLORIDE 9 MG/ML
25 INJECTION, SOLUTION INTRAVENOUS CONTINUOUS
Status: CANCELLED | OUTPATIENT
Start: 2020-07-16

## 2020-07-15 RX ORDER — SODIUM CHLORIDE 0.9 % (FLUSH) 0.9 %
10 SYRINGE (ML) INJECTION AS NEEDED
Status: CANCELLED
Start: 2020-07-16

## 2020-07-15 RX ORDER — ACETAMINOPHEN 325 MG/1
650 TABLET ORAL AS NEEDED
Status: CANCELLED
Start: 2020-07-16

## 2020-07-15 RX ADMIN — Medication 10 ML: at 09:01

## 2020-07-15 RX ADMIN — SODIUM CHLORIDE 25 ML/HR: 900 INJECTION, SOLUTION INTRAVENOUS at 08:35

## 2020-07-15 RX ADMIN — FERRIC CARBOXYMALTOSE INJECTION 750 MG: 50 INJECTION, SOLUTION INTRAVENOUS at 08:35

## 2020-07-15 NOTE — PROGRESS NOTES
[facility] Progress Note    Date: July 15, 2020    Name: Colin Horowitz    MRN: [id number]         : 1990    Injectafer Infusion 1 of 2    Ms. Case to [facility], [facility], at 0900. Pt was assessed and education was provided. Ms. Case's vitals were reviewed and patient was observed for 5 minutes prior to treatment. Visit Vitals  /86 (BP 1 Location: Left arm, BP Patient Position: Sitting)   Pulse 65   Temp 97.3 °F (36.3 °C)   Resp 18   SpO2 99%   Breastfeeding No       22 g PIV placed in the right AC x1 attempt. PIV flushed easily and had brisk blood return. Injectafer 750mg infused over 20 minutes in 250mL NS.    Ms. Oswaldo Luciano tolerated the infusion, and had no complaints. VS remained stable. PIV removed. No bleeding or hematoma noted at site. Gauze and coban applied. Patient armband removed and shredded. Ms. Oswaldo Luciano was discharged from [place] [address] in stable condition at 7418. Ms. Oswaldo Luciano is to return 20 at 1300 for her next appointment.      Trae Orozco RN  July 15, 2020

## 2020-07-22 ENCOUNTER — APPOINTMENT (OUTPATIENT)
Dept: INFUSION THERAPY | Age: 30
End: 2020-07-22
Payer: MEDICAID

## 2020-07-28 ENCOUNTER — HOSPITAL ENCOUNTER (OUTPATIENT)
Dept: INFUSION THERAPY | Age: 30
Discharge: HOME | End: 2020-07-28
Payer: MEDICAID

## 2020-07-28 VITALS
DIASTOLIC BLOOD PRESSURE: 91 MMHG | SYSTOLIC BLOOD PRESSURE: 127 MMHG | HEART RATE: 67 BPM | TEMPERATURE: 98 F | RESPIRATION RATE: 16 BRPM

## 2020-07-28 DIAGNOSIS — E53.8 B12 DEFICIENCY: Primary | ICD-10-CM

## 2020-07-28 PROCEDURE — 74011250636 HC RX REV CODE- 250/636: Performed by: INTERNAL MEDICINE

## 2020-07-28 PROCEDURE — 96365 THER/PROPH/DIAG IV INF INIT: CPT

## 2020-07-28 RX ORDER — HEPARIN 100 UNIT/ML
300-500 SYRINGE INTRAVENOUS AS NEEDED
Status: CANCELLED
Start: 2020-07-29

## 2020-07-28 RX ORDER — SODIUM CHLORIDE 9 MG/ML
25 INJECTION, SOLUTION INTRAVENOUS CONTINUOUS
Status: CANCELLED | OUTPATIENT
Start: 2020-07-29

## 2020-07-28 RX ORDER — DIPHENHYDRAMINE HYDROCHLORIDE 50 MG/ML
50 INJECTION, SOLUTION INTRAMUSCULAR; INTRAVENOUS AS NEEDED
Status: CANCELLED
Start: 2020-07-29

## 2020-07-28 RX ORDER — SODIUM CHLORIDE 9 MG/ML
10 INJECTION INTRAMUSCULAR; INTRAVENOUS; SUBCUTANEOUS AS NEEDED
Status: CANCELLED | OUTPATIENT
Start: 2020-07-29

## 2020-07-28 RX ORDER — HYDROCORTISONE SODIUM SUCCINATE 100 MG/2ML
100 INJECTION, POWDER, FOR SOLUTION INTRAMUSCULAR; INTRAVENOUS AS NEEDED
Status: CANCELLED | OUTPATIENT
Start: 2020-07-29

## 2020-07-28 RX ORDER — SODIUM CHLORIDE 0.9 % (FLUSH) 0.9 %
10 SYRINGE (ML) INJECTION AS NEEDED
Status: DISPENSED | OUTPATIENT
Start: 2020-07-28 | End: 2020-07-28

## 2020-07-28 RX ORDER — ONDANSETRON 2 MG/ML
8 INJECTION INTRAMUSCULAR; INTRAVENOUS AS NEEDED
Status: CANCELLED | OUTPATIENT
Start: 2020-07-29

## 2020-07-28 RX ORDER — ALBUTEROL SULFATE 0.83 MG/ML
2.5 SOLUTION RESPIRATORY (INHALATION) AS NEEDED
Status: CANCELLED
Start: 2020-07-29

## 2020-07-28 RX ORDER — DIPHENHYDRAMINE HYDROCHLORIDE 50 MG/ML
25 INJECTION, SOLUTION INTRAMUSCULAR; INTRAVENOUS AS NEEDED
Status: CANCELLED
Start: 2020-07-29

## 2020-07-28 RX ORDER — ACETAMINOPHEN 325 MG/1
650 TABLET ORAL AS NEEDED
Status: CANCELLED
Start: 2020-07-29

## 2020-07-28 RX ORDER — SODIUM CHLORIDE 0.9 % (FLUSH) 0.9 %
10 SYRINGE (ML) INJECTION AS NEEDED
Status: CANCELLED
Start: 2020-07-29

## 2020-07-28 RX ORDER — SODIUM CHLORIDE 9 MG/ML
25 INJECTION, SOLUTION INTRAVENOUS CONTINUOUS
Status: DISPENSED | OUTPATIENT
Start: 2020-07-28 | End: 2020-07-28

## 2020-07-28 RX ORDER — EPINEPHRINE 1 MG/ML
0.3 INJECTION, SOLUTION, CONCENTRATE INTRAVENOUS AS NEEDED
Status: CANCELLED | OUTPATIENT
Start: 2020-07-29

## 2020-07-28 RX ORDER — SODIUM CHLORIDE 9 MG/ML
10 INJECTION INTRAMUSCULAR; INTRAVENOUS; SUBCUTANEOUS AS NEEDED
Status: ACTIVE | OUTPATIENT
Start: 2020-07-28 | End: 2020-07-28

## 2020-07-28 RX ADMIN — SODIUM CHLORIDE 25 ML/HR: 900 INJECTION, SOLUTION INTRAVENOUS at 11:38

## 2020-07-28 RX ADMIN — SODIUM CHLORIDE 10 ML: 9 INJECTION INTRAMUSCULAR; INTRAVENOUS; SUBCUTANEOUS at 12:02

## 2020-07-28 RX ADMIN — Medication 10 ML: at 11:30

## 2020-07-28 RX ADMIN — FERRIC CARBOXYMALTOSE INJECTION 750 MG: 50 INJECTION, SOLUTION INTRAVENOUS at 11:38

## 2020-07-28 NOTE — PROGRESS NOTES
4:28 PM       [address] [facility] Progress Note    Date: 2020    Name: Olga Morales    MRN: [id number]         : 1990    Injectafer Infusion    Ms. Case to [facility], ambulatory, at 1120. Pt was assessed and education was provided. Patient denied complaints of pain/discomfort or adverse effect of initial dose of IV Injectafer. Ms. Case's vitals were reviewed and patient was observed for 5 minutes prior to treatment. Visit Vitals  BP (!) 127/91 (BP 1 Location: Right arm, BP Patient Position: At rest;Sitting)   Pulse 67   Temp 98 °F (36.7 °C)   Resp 16         22g PIV placed in left antecubital vein x 1 attempt by Nasrin Lama RN. One unsuccessful attempt to cannulate right antecubital vein was made by this nurse. Both sites without evidence of complication Left PIV flushed easily with 10 mL NS and had brisk blood return. Patient denied complaints. Injectafer 750mg in 250 mL NS IV was initiated @ 750mL/hr and infused over  20 minutes, as ordered. 5 minutes into infusion, VS stable and pt denied complaints of itching, lip/tongue/facial swelling, SOB, CP or other complaints. Ms. Henry Elliott tolerated the infusion, and had no complaints. VS remained stable. Patient Vitals for the past 12 hrs:   Temp Pulse Resp BP   20 1205 98 °F (36.7 °C) 67 16 (!) 127/91   20 1119 99.1 °F (37.3 °C) 69 20 119/86       PIV flushed with NS 10 mL and catheter removed intact. No bleeding, hematoma or other evidence of complication was noted at site. Gauze and coban applied. Reviewed discharge instructions with patient, including expected side effects (abdominal cramping, nausea, changes in color of urine or feces) and signs of allergic reaction requiring medical attention (itching/hives/rashes, SOB, chest pain, lip/tongue/facial swelling). Patient verbalized understanding of discharge instructions. Patient armband removed and shredded.     Ms. Henry Elliott was discharged from [place] [address] in stable condition at 1205. She is to follow up with Dr. Rosanna Pennington as needed.     Eloy Ayers RN  July 28, 2020  4:28 PM

## 2020-07-29 ENCOUNTER — APPOINTMENT (OUTPATIENT)
Dept: INFUSION THERAPY | Age: 30
End: 2020-07-29
Payer: MEDICAID

## 2020-08-14 ENCOUNTER — TELEPHONE (OUTPATIENT)
Dept: SURGERY | Age: 30
End: 2020-08-14

## 2020-08-14 DIAGNOSIS — E66.01 MORBID OBESITY (HCC): ICD-10-CM

## 2020-08-14 DIAGNOSIS — D50.8 IRON DEFICIENCY ANEMIA DUE TO DIETARY CAUSES: ICD-10-CM

## 2020-08-14 DIAGNOSIS — E55.9 HYPOVITAMINOSIS D: ICD-10-CM

## 2020-08-14 DIAGNOSIS — K91.2 POSTOPERATIVE MALABSORPTION: Primary | ICD-10-CM

## 2020-08-24 ENCOUNTER — HOSPITAL ENCOUNTER (OUTPATIENT)
Dept: LAB | Age: 30
Discharge: HOME | End: 2020-08-24

## 2020-08-24 LAB — XX-LABCORP SPECIMEN COL,LCBCF: NORMAL

## 2020-08-24 PROCEDURE — 99001 SPECIMEN HANDLING PT-LAB: CPT

## 2020-08-26 LAB
25(OH)D3+25(OH)D2 SERPL-MCNC: 20.6 NG/ML (ref 30–100)
ALBUMIN SERPL-MCNC: 4.5 G/DL (ref 3.8–4.8)
BASOPHILS # BLD AUTO: 0 X10E3/UL (ref 0–0.2)
BASOPHILS NFR BLD AUTO: 1 %
BUN SERPL-MCNC: 12 MG/DL (ref 6–20)
BUN/CREAT SERPL: 15 (ref 9–23)
CALCIUM SERPL-MCNC: 9.1 MG/DL (ref 8.7–10.2)
CHLORIDE SERPL-SCNC: 105 MMOL/L (ref 96–106)
CO2 SERPL-SCNC: 23 MMOL/L (ref 20–29)
CREAT SERPL-MCNC: 0.78 MG/DL (ref 0.57–1)
EOSINOPHIL # BLD AUTO: 0.1 X10E3/UL (ref 0–0.4)
EOSINOPHIL NFR BLD AUTO: 2 %
ERYTHROCYTE [DISTWIDTH] IN BLOOD BY AUTOMATED COUNT: 17.5 % (ref 11.7–15.4)
FERRITIN SERPL-MCNC: 94 NG/ML (ref 15–150)
FOLATE SERPL-MCNC: 8.3 NG/ML
GLUCOSE SERPL-MCNC: 91 MG/DL (ref 65–99)
HCT VFR BLD AUTO: 38.3 % (ref 34–46.6)
HGB BLD-MCNC: 11.4 G/DL (ref 11.1–15.9)
IMM GRANULOCYTES # BLD AUTO: 0 X10E3/UL (ref 0–0.1)
IMM GRANULOCYTES NFR BLD AUTO: 0 %
IRON SERPL-MCNC: 104 UG/DL (ref 27–159)
LYMPHOCYTES # BLD AUTO: 1.6 X10E3/UL (ref 0.7–3.1)
LYMPHOCYTES NFR BLD AUTO: 30 %
MCH RBC QN AUTO: 25.3 PG (ref 26.6–33)
MCHC RBC AUTO-ENTMCNC: 29.8 G/DL (ref 31.5–35.7)
MCV RBC AUTO: 85 FL (ref 79–97)
MONOCYTES # BLD AUTO: 0.6 X10E3/UL (ref 0.1–0.9)
MONOCYTES NFR BLD AUTO: 10 %
NEUTROPHILS # BLD AUTO: 3.2 X10E3/UL (ref 1.4–7)
NEUTROPHILS NFR BLD AUTO: 57 %
PLATELET # BLD AUTO: 245 X10E3/UL (ref 150–450)
POTASSIUM SERPL-SCNC: 4.3 MMOL/L (ref 3.5–5.2)
RBC # BLD AUTO: 4.5 X10E6/UL (ref 3.77–5.28)
SODIUM SERPL-SCNC: 141 MMOL/L (ref 134–144)
VIT B1 BLD-SCNC: 128.6 NMOL/L (ref 66.5–200)
VIT B12 SERPL-MCNC: 615 PG/ML (ref 232–1245)
WBC # BLD AUTO: 5.5 X10E3/UL (ref 3.4–10.8)

## 2020-09-01 ENCOUNTER — VIRTUAL VISIT (OUTPATIENT)
Dept: SURGERY | Age: 30
End: 2020-09-01

## 2020-09-01 VITALS — BODY MASS INDEX: 42.95 KG/M2 | WEIGHT: 290 LBS | HEIGHT: 69 IN

## 2020-09-01 DIAGNOSIS — K91.2 POSTOPERATIVE MALABSORPTION: Primary | ICD-10-CM

## 2020-09-01 DIAGNOSIS — Z98.84 HISTORY OF ROUX-EN-Y GASTRIC BYPASS: ICD-10-CM

## 2020-09-01 DIAGNOSIS — E66.01 MORBID OBESITY (HCC): ICD-10-CM

## 2020-09-01 DIAGNOSIS — R63.5 WEIGHT GAIN: ICD-10-CM

## 2020-09-01 DIAGNOSIS — E55.9 HYPOVITAMINOSIS D: ICD-10-CM

## 2020-09-01 DIAGNOSIS — Z71.3 DIETARY COUNSELING AND SURVEILLANCE: ICD-10-CM

## 2020-09-01 RX ORDER — LANOLIN ALCOHOL/MO/W.PET/CERES
CREAM (GRAM) TOPICAL
COMMUNITY

## 2020-09-01 NOTE — PROGRESS NOTES
[address] is a 26 y.o. female that presents today to follow up post  LGBP preformed on 8/2014. Pt says pain level is at a 0 on a scale from 1-10. Pt is drinking 67oz of fluid daily. Pt is currently eating chicken,fish,groud beef, pasta once a week, eggs, 2-3 times. Pt says the amount of time spent exercising is walking 45-1 hours daily. 1. Have you been to the ER, urgent care clinic since your last visit? Hospitalized since your last visit? No    2. Have you seen or consulted any other health care providers outside of the [address] [place] [address] since your last visit? Include any pap smears or colon screening.  No

## 2020-09-01 NOTE — PROGRESS NOTES
Consent:  Vicki Case and/or her healthcare decision maker is aware that this patient-initiated Telehealth encounter is a billable service, with coverage as determined by her insurance carrier. She is aware that she may receive a bill and has provided verbal consent to proceed: Yes    Provider location while conducting visit: in the office  Patient location: Home  Other person participating in the telehealth services: FANNY Daniel   Visit start: 1336  Visit end: 5945        Bariatric Postoperative Progress Note    [address] is a 26 y.o. female is now 6 years status post laparoscopic gastric bypass surgery August 2014 who was seen by synchronous (real-time) audio-video technology on 9/1/2020. Doing well overall. Currently on a stage 4 diet without difficulty. Taking in 64oz water, source of protein is fish, eggs, chicken. 45min to 1 hour daily of activity 7 days a week. Bowel movements are regular. The patient is not having any pain. . She is compliant with multivitamins, calcium, Vit D and B12 supplements.     Lowest weight: 165 postoperatively and today's weight 290 lbs (due to pregnancies)  Goal weight: 180 lbs      Weight Loss Metrics 9/1/2020 9/1/2020 1/28/2020 1/28/2020 11/18/2019 8/15/2019 7/10/2019   Pre op / Initial Wt 367 - 302 - - - 302   Today's Wt - 290 lb - 250 lb 264 lb 259 lb 12.8 oz -   BMI - 42.83 kg/m2 - 36.92 kg/m2 38.99 kg/m2 38.37 kg/m2 -   Ideal Body Wt 146 - 146 - - - 146   Excess Body Wt 221 - 156 - - - 156   Goal Wt 190 - 177 - - - 177   Wt loss to date 77 - 52 - - - 44   % Wt Loss 0.44 - 0.42 - - - 0.35   80% .8 - 124.8 - - - 124.8             Past Medical History:   Diagnosis Date    Anemia     Arrhythmia     palpitations/pt denies    Asthma     Depression     Dizziness     daily     Joint pain     Morbid obesity with BMI of 50.0-59.9, adult (HCC)     Shortness of breath     Sleep apnea     Unspecified sleep apnea        Past Surgical History: Procedure Laterality Date    ABDOMEN SURGERY PROC UNLISTED  2014    gastric bypass with repair hiatal hernia    DELIVERY       x 3    HX GI      gastric bypass    HX GYN      c section x 4    HX ORTHOPAEDIC  2017    R ring finger fx repair, due to scar tissue       Current Outpatient Medications   Medication Sig Dispense Refill    ferrous sulfate (Iron) 325 mg (65 mg iron) tablet Take  by mouth Daily (before breakfast).  cyanocobalamin, vitamin B-12, (Vitamin B-12) 5,000 mcg subl 5,000 mcg by SubLINGual route daily for 90 days. 90 Tab 3    thiamine HCL (VITAMIN B-1) 100 mg tablet Take  by mouth daily.  calcium citrate-vitamin d2 1,500-200 mg-unit tab Take  by mouth.  montelukast (SINGULAIR) 10 mg tablet Take 10 mg by mouth daily.  multivitamin with iron (FLINTSTONES) chewable tablet Take 1 tablet by mouth two (2) times a day.  Cholecalciferol, Vitamin D3, (VITAMIN D3) 2,000 unit cap capsule Take  by mouth two (2) times a day.  albuterol (PROAIR HFA) 90 mcg/actuation inhaler Take 2 Puffs by inhalation as needed.  cyanocobalamin (VITAMIN B12) 500 mcg tablet Take 500 mcg by mouth two (2) times a day. Allergies   Allergen Reactions    Pollen Extracts Other (comments)     Nasal symptoms           ROS:  Review of Systems:  Positives in [place]    Constitutional:  Unexpected weight gain/loss, night sweats,fatigue/maliase/lethargy, change in sleep, fever, rash  Eyes:  Visual changes, eye pain, floaters  ENT:  Rhinorrhea, epistaxis, sinus pain, change in hearing, gingival bleeding, sore throat, dysphagia, dysphonia  CV:  Chest pain, shortness of breath, difficulty breathing, orthopnea, palpitations, loss of consciousness, claudication  Resp: Cough, wheeze, haemoptysis, sob, exercise intolerence  GI:  Abdominal pain, dysphagia, reflux, bloating, cramping. Obstipation, haematemesis, brbpr, hematochezia,dark tarry stools.   Nausea, vomitting, diarrhea, constipation. : Change in frequency of urination, dysuria, hematuria, change in force of Stream  Neuro: Paresthesias, numbness, weakness, changes in balance, changes in speech, loss of control of bowel or bladder, headaches. Psych:Changes in depression, anxiety, sleep patterns, change in energy Levels  Endocrine: Temperature intolerance, dry skin, hair loss, fatigue,  Episodes of hypoglycemia, changes in libido  Heme: Anemia, pupura, petechia  Skin: Rashes, itchiness, excessive bruising  Musculoskeletal: weakness, arthropathy    Remainder of ROS as per HPI.         Objective:     Vital Signs: (As obtained by patient/caregiver at home)  Visit Vitals  Ht 5' 9\" (1.753 m)   Wt 131.5 kg (290 lb)   BMI 42.83 kg/m²       Constitutional: [x] Appears well-developed and well-nourished [x] No apparent distress      [] Abnormal -     Mental status: [x] Alert and awake  [x] Oriented to person/place/time [x] Able to follow commands    [] Abnormal -     Eyes:   EOM    [x]  Normal    [] Abnormal -   Sclera  [x]  Normal    [] Abnormal -          Discharge [x]  None visible   [] Abnormal -     HENT: [x] Normocephalic, atraumatic  [] Abnormal -   [x] Mouth/Throat: Mucous membranes are moist    External Ears [x] Normal  [] Abnormal -    Neck: [x] No visualized mass [] Abnormal -     Pulmonary/Chest: [x] Respiratory effort normal   [x] No visualized signs of difficulty breathing or respiratory distress        [] Abnormal -      Musculoskeletal:   [x] Normal gait with no signs of ataxia         [x] Normal range of motion of neck        [] Abnormal -     Neurological:        [x] No Facial Asymmetry (Cranial nerve 7 motor function) (limited exam due to video visit)          [x] No gaze palsy        [] Abnormal -          Skin:        [x] No significant exanthematous lesions or discoloration noted on facial skin         [] Abnormal -            Psychiatric:       [x] Normal Affect [] Abnormal -        [x] No Hallucinations        Labs:    Assessment/Plan: Today, [address] is  Height: 5' 9\" (175.3 cm) tall, Weight: 131.5 kg (290 lb) lbs for a Body mass index is 42.83 kg/m². and are suffering from morbid obesity . They are currently 6 years s/p laparoscopic gastric bypass surgery with a total weight loss of 77lbs to date, doing well. Now that she is no longer breast feeding her toddler. She would like to focus on weight goal.   Labs were reviewed  and pt was instructed to increase vitamin  To D3 5000 iu daily. Stressed importance of hydration with SF clear liquids until urine clear. Continue with food content mainly protein veggies. Encouraged support group attendance. Advised exercise program of 150 mins per week. Plan to get back on track and would like to start off doing pouch reset:  Pouch reset: 5 to 7 days liquid diet   After pouch reset follow 1200 calorie diet and focus on mainly protein,  meat and veggies. Eliminate carbs, sugar, and starches. 50 to 60g of protein per day  See me in two weeks to discuss pharmacetical therapy for weight loss  Complete EKG prior to appointment with this provider. We discussed the expected course, resolution and complications of the diagnosis(es) in detail. Medication risks, benefits, costs, interactions, and alternatives were discussed as indicated. I advised her to contact the office if her condition worsens, changes or fails to improve as anticipated. She expressed understanding with the diagnosis(es) and plan. 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 Diagnoses of Morbid obesity ([address] Hypovitaminosis D, History of Antony-en-Y gastric bypass, Dietary counseling and surveillance, and Weight gain were also pertinent to this visit. Follow-up and Dispositions    · Return in about 2 weeks (around 9/15/2020). with labs, sooner as needed.       Services were provided through a video synchronous discussion virtually to substitute for in-person clinic visit. Pursuant to the emergency declaration under the [facility] waiver authority and the [employer] and [employer] Act, this Virtual  Visit was conducted, with patient's consent, to reduce the patient's risk of exposure to COVID-19 and provide continuity of care for an established patient.     Shawnee Noriega, NP

## 2020-09-02 NOTE — PATIENT INSTRUCTIONS
Orders Placed This Encounter  EKG, 12 LEAD, INITIAL Standing Status:   Future Standing Expiration Date:   3/4/2021 Order Specific Question:   Reason for Exam: Answer:   preop

## 2020-09-21 ENCOUNTER — OFFICE VISIT (OUTPATIENT)
Dept: SURGERY | Age: 30
End: 2020-09-21

## 2020-09-21 ENCOUNTER — HOSPITAL ENCOUNTER (OUTPATIENT)
Dept: LAB | Age: 30
Discharge: HOME | End: 2020-09-21
Payer: MEDICAID

## 2020-09-21 VITALS
WEIGHT: 277.6 LBS | BODY MASS INDEX: 41.12 KG/M2 | RESPIRATION RATE: 18 BRPM | OXYGEN SATURATION: 98 % | DIASTOLIC BLOOD PRESSURE: 86 MMHG | SYSTOLIC BLOOD PRESSURE: 130 MMHG | HEART RATE: 64 BPM | TEMPERATURE: 97.5 F | HEIGHT: 69 IN

## 2020-09-21 DIAGNOSIS — E66.01 MORBID OBESITY (HCC): ICD-10-CM

## 2020-09-21 DIAGNOSIS — R63.5 WEIGHT GAIN: ICD-10-CM

## 2020-09-21 DIAGNOSIS — K91.2 POSTOPERATIVE MALABSORPTION: ICD-10-CM

## 2020-09-21 DIAGNOSIS — Z71.3 DIETARY COUNSELING AND SURVEILLANCE: ICD-10-CM

## 2020-09-21 DIAGNOSIS — Z98.84 HISTORY OF ROUX-EN-Y GASTRIC BYPASS: ICD-10-CM

## 2020-09-21 DIAGNOSIS — E55.9 HYPOVITAMINOSIS D: ICD-10-CM

## 2020-09-21 DIAGNOSIS — K91.2 POSTOPERATIVE MALABSORPTION: Primary | ICD-10-CM

## 2020-09-21 PROCEDURE — 93005 ELECTROCARDIOGRAM TRACING: CPT

## 2020-09-21 NOTE — PROGRESS NOTES
[address] is a 26 y.o. female  Chief Complaint   Patient presents with    Follow-up     GBP 8/12/14     Pt ID confirmed    Weight Loss Metrics 9/21/2020 9/21/2020 9/1/2020 9/1/2020 1/28/2020 1/28/2020 11/18/2019   Pre op / Initial Wt 302 - 367 - 302 - -   Today's Wt - 277 lb 9.6 oz - 290 lb - 250 lb 264 lb   BMI - 40.99 kg/m2 - 42.83 kg/m2 - 36.92 kg/m2 38.99 kg/m2   Ideal Body Wt 146 - 146 - 146 - -   Excess Body Wt 156 - 221 - 156 - -   Goal Wt - - 190 - 177 - -   Wt loss to date 24.4 - 77 - 52 - -   % Wt Loss 0.2 - 0.44 - 0.42 - -   80% .8 - 176.8 - 124.8 - -       Body mass index is 40.99 kg/m².     Post op medications:  MVI: flintstones twice daily  Calcium Citrate: 1500 milligrams  B-1 100 milligrams  B-12 1000 micrograms  Vit D 3 5000 units

## 2020-09-21 NOTE — PROGRESS NOTES
[address] is a 26 y.o. female is now 6 years status post laparoscopic gastric bypass surgery. Doing well overall. Currently on a stage 4 diet without difficulty. Taking in 64oz water daily and protein intake 50 to 60g per day. Exercise is daily daily 45 to 60 min, sometimes twice daily. Bowel movements are regular. The patient is compliant with multivitamins, calcium, Vit D and B12 supplements. Weight Loss Metrics 2019   Pre op / Initial Wt 302 - 367 - 302 - -   Today's Wt - 277 lb 9.6 oz - 290 lb - 250 lb 264 lb   BMI - 40.99 kg/m2 - 42.83 kg/m2 - 36.92 kg/m2 38.99 kg/m2   Ideal Body Wt 146 - 146 - 146 - -   Excess Body Wt 156 - 221 - 156 - -   Goal Wt - - 190 - 177 - -   Wt loss to date 24.4 - 77 - 52 - -   % Wt Loss 0.2 - 0.44 - 0.42 - -   80% .8 - 176.8 - 124.8 - -       Body mass index is 40.99 kg/m². Past Medical History:   Diagnosis Date    Anemia     Arrhythmia     palpitations/pt denies    Asthma     Depression     Dizziness     daily     Joint pain     Morbid obesity with BMI of 50.0-59.9, adult (HCC)     Shortness of breath     Sleep apnea     Unspecified sleep apnea        Past Surgical History:   Procedure Laterality Date    ABDOMEN SURGERY PROC UNLISTED  2014    gastric bypass with repair hiatal hernia    DELIVERY       x 3    HX GI      gastric bypass    HX GYN      c section x 4    HX ORTHOPAEDIC  2017    R ring finger fx repair, due to scar tissue       Current Outpatient Medications   Medication Sig Dispense Refill    ferrous sulfate (Iron) 325 mg (65 mg iron) tablet Take  by mouth Daily (before breakfast).  thiamine HCL (VITAMIN B-1) 100 mg tablet Take  by mouth daily.  calcium citrate-vitamin d2 1,500-200 mg-unit tab Take  by mouth.  montelukast (SINGULAIR) 10 mg tablet Take 10 mg by mouth daily.       multivitamin with iron (FLINTSTONES) chewable tablet Take 1 tablet by mouth two (2) times a day.  Cholecalciferol, Vitamin D3, (VITAMIN D3) 2,000 unit cap capsule Take 5,000 Units by mouth daily.  cyanocobalamin (VITAMIN B12) 500 mcg tablet Take 1,000 mcg by mouth two (2) times a day.  albuterol (PROAIR HFA) 90 mcg/actuation inhaler Take 2 Puffs by inhalation as needed. Allergies   Allergen Reactions    Pollen Extracts Other (comments)     Nasal symptoms         Objective:     Visit Vitals  /86   Pulse 64   Temp 97.5 °F (36.4 °C) (Temporal)   Resp 18   Ht 5' 9\" (1.753 m)   Wt 125.9 kg (277 lb 9.6 oz)   LMP 09/04/2020   SpO2 98%   BMI 40.99 kg/m²       General:  alert, cooperative, no distress, appears stated age   Chest: no accessory muscle use   Cor:   Regular rate and rhythm   Abdomen: soft, bowel sounds active, non-tender           Assessment/Plan: Today, [address]  is  Height: 5' 9\" (175.3 cm) tall, Weight: 125.9 kg (277 lb 9.6 oz) lbs for a Body mass index is 40.99 kg/m². and are suffering from morbid obesity . They are currently 6 s/p laparoscopic gastric bypass surgery with a total weight loss of 24.4lbs to date, doing well. Labs were reviewed and pt was instructed to complete EKG asap. Stressed importance of hydration with SF clear liquids until urine clear. Continue with food content mainly protein veggies. LCD-1000 calories per day   After pouch reset: she is down 12 lbs since last visit 3 weeks ago. Encouraged support group attendance. Advised exercise program of 150 mins per week. Will start low dose phentermine after patient complete EKG. If EKG is normal, I will prescribe phentermine 15mg daily to help reduce weight and curve cravings. We discussed the expected course, resolution and complications of the diagnosis(es) in detail. Medication risks, benefits, costs, interactions, and alternatives were discussed as indicated.   I advised her to contact the office if her condition worsens, changes or fails to improve as anticipated. She expressed understanding with the diagnosis(es) and plan. 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 Diagnoses of Morbid obesity ([facility] [address] History of Antony-en-Y gastric bypass, Dietary counseling and surveillance, and Weight gain were also pertinent to this visit. Follow-up and Dispositions    · Return in about 1 month (around 10/21/2020). with labs, sooner as needed.

## 2020-09-21 NOTE — PATIENT INSTRUCTIONS
Complete EKG today Will prescribe low dose phentermine 15mg daily  if EKG is normal- 
LCD-1000 calories per day mainly protein and veggies Continue 50 to 60 g of protein per day eliminating carbs and sugars. May have 1 premier protein shake to complete caloric intake for the day-if she falls short. Follow up monthly in office

## 2020-09-22 ENCOUNTER — HOSPITAL ENCOUNTER (OUTPATIENT)
Dept: BARIATRICS/WEIGHT MGMT | Age: 30
Discharge: HOME | End: 2020-09-22

## 2020-09-22 ENCOUNTER — DOCUMENTATION ONLY (OUTPATIENT)
Dept: BARIATRICS/WEIGHT MGMT | Age: 30
End: 2020-09-22

## 2020-09-22 DIAGNOSIS — R63.5 WEIGHT GAIN: ICD-10-CM

## 2020-09-22 DIAGNOSIS — K91.2 POSTOPERATIVE MALABSORPTION: Primary | ICD-10-CM

## 2020-09-22 LAB
ATRIAL RATE: 70 BPM
CALCULATED P AXIS, ECG09: 55 DEGREES
CALCULATED R AXIS, ECG10: -3 DEGREES
CALCULATED T AXIS, ECG11: 18 DEGREES
DIAGNOSIS, 93000: NORMAL
P-R INTERVAL, ECG05: 176 MS
Q-T INTERVAL, ECG07: 434 MS
QRS DURATION, ECG06: 80 MS
QTC CALCULATION (BEZET), ECG08: 468 MS
VENTRICULAR RATE, ECG03: 70 BPM

## 2020-09-22 RX ORDER — PHENTERMINE HYDROCHLORIDE 15 MG/1
15 CAPSULE ORAL
Qty: 30 CAP | Refills: 0 | Status: SHIPPED | OUTPATIENT
Start: 2020-09-22 | End: 2020-10-21 | Stop reason: DRUGHIGH

## 2020-09-22 NOTE — PROGRESS NOTES
[address] Evelina Loss [place] [address] [facility], [address]      Patient's Name: Shakir El   Age: 26 y.o. YOB: 1990   Sex: female    Date:   9/22/2020    Height: 5 f 9 Weight:    277      Lbs. BMI:      Surgery Date: 8/12/14    Starting Weight: 302   Last Recorded Weight: 3/13/19\" 266  Overall Pounds Lost: 25     Procedure: Gastric Bypass    Lowest Weight patient has achieved since surgery: 165    How long has patient been at this weight for: She states she got up to 290. She states she was put on a liquid diet by [facility] and is down to 277. Per [facility], patient needs to eat 900-1000 calories     Other Pertinent Information:     Diet History (reported by patient on diet history form)    Do you smoke: None    Alcohol Intake: None    Meals per day: 3    Diet History:  Patient states she is going to try to eat by 8 am.  She is going to eat 2 eggs for breakfast.  12:00 pm:  Patient states she will do lettuce with cheese and turkey meat. She states she may have celery and some cheese. Sometime between 4:30-6:00 pm:  Patient is doing some protein and vegetables. She states she is just drinking water. She states if she is hungry, she will do the sugar free jell-o. Portion sizes ~: Portions are typically palm size    Simple sugar intake: Patient states this has been cut out since seeing [facility]. Experiencing dumping syndrome:  She does get dumping syndrome if she eats sugar. Carbohydrate intake: She states she was eating a lot of carbohydrates. Symptoms that occur when carbohydrates are consumed: She was experiencing reactive hypoglycemia, but is not now that she stopped the carbohydrates. Ounces of fluid consumed per day: 64    Fluids being consumed: water    Patient was encouraged to start back on the protein shakes between meals.     Patient is snacking on: None    Exercise History    Patient is doing 39 minutes-120 minutes of power walking or walking slow. Vitamins    Patient is taking bid Multivitamins per day    Patient is taking Calcium in the form of pills- citrate. Patient is taking 1500 mg per day. Patient is taking 1000 mcg of Vitamin B12. Patient is taking 5000 bid IU of Vitamin D 3 today. Patient is taking 100 mg of Vitamin B1. Patient is taking additional iron at a dosage of 65 mg. Summary:  Weight loss is at 25 pounds. I have reinforced the key diet principles to the patient. Patient was instructed to follow a 3 meal a day routine. I have reinforced the importance of filling up on meat and vegetables and avoiding carbohydrates. I have talked with patient about reactive hypoglycemia and although carbohydrates are not good from a weight-management point of view, they are actually very dangerous and should be avoided. If patient is getting hungry between meals focus on meat and vegetables and a list of food choices was reviewed with patient. Reinforced to patient the importance of being properly hydrated and the need to get 64 ounces of fluid in per day. Reinforced to patient the need to do 30 minutes of exercise per day. We also spent some time talking about the vitamins and the importance of taking them. Reinforced the dosage of vitamins to patient. Patient was reminded that the vitamins are lifelong. Other Pertinent Information: Patient states she has fallen off track, but since seeing Odilia Gupta, going on the liquid diet for 1 week and then following up and eating meat and vegetables only, she has come down 13 pounds. She states she was doing fine with just meat and vegetables, but is starting to get hungry again. Patient was encouraged to use the protein shakes between meals. She was also given ideas of protein snacks. She was prescribed phentermine per Odilia Gupta and wants her around 900-1000 calories.   Patient is eager to try some of the suggestions for snacks and meal ideas. She is taking her vitamins, as instructed. Patient's activity level is appropriate to promote continued weight loss. Will monitor her progress in 2 months.       Cleo Troy, MS RD  9/22/2020

## 2020-09-28 ENCOUNTER — TELEPHONE (OUTPATIENT)
Dept: SURGERY | Age: 30
End: 2020-09-28

## 2020-09-28 NOTE — TELEPHONE ENCOUNTER
Per fax from [phone] [facility] medication Phentermine requires a  PA.

## 2020-10-21 ENCOUNTER — OFFICE VISIT (OUTPATIENT)
Dept: SURGERY | Age: 30
End: 2020-10-21
Payer: MEDICAID

## 2020-10-21 VITALS
HEART RATE: 64 BPM | BODY MASS INDEX: 40.43 KG/M2 | WEIGHT: 273 LBS | HEIGHT: 69 IN | SYSTOLIC BLOOD PRESSURE: 114 MMHG | RESPIRATION RATE: 18 BRPM | TEMPERATURE: 98.2 F | DIASTOLIC BLOOD PRESSURE: 74 MMHG | OXYGEN SATURATION: 98 %

## 2020-10-21 DIAGNOSIS — R63.5 WEIGHT GAIN: ICD-10-CM

## 2020-10-21 DIAGNOSIS — Z71.3 DIETARY COUNSELING AND SURVEILLANCE: ICD-10-CM

## 2020-10-21 DIAGNOSIS — Z98.84 HISTORY OF ROUX-EN-Y GASTRIC BYPASS: Primary | ICD-10-CM

## 2020-10-21 DIAGNOSIS — E66.01 MORBID OBESITY WITH BMI OF 40.0-44.9, ADULT (HCC): ICD-10-CM

## 2020-10-21 DIAGNOSIS — K91.2 POSTOPERATIVE MALABSORPTION: ICD-10-CM

## 2020-10-21 PROCEDURE — 99213 OFFICE O/P EST LOW 20 MIN: CPT | Performed by: NURSE PRACTITIONER

## 2020-10-21 RX ORDER — PHENTERMINE HYDROCHLORIDE 37.5 MG/1
37.5 TABLET ORAL
Qty: 30 TAB | Refills: 0 | Status: SHIPPED | OUTPATIENT
Start: 2020-10-21 | End: 2020-11-19 | Stop reason: SDUPTHER

## 2020-10-21 NOTE — PROGRESS NOTES
[facility] Progress Note:   F/up Provider Visit     CC: Weight Management      Gerald Estrada is a 26 y.o. female who is here for her  f/up medical provider visit for pharmaceutical weight loss therapy. The patient is 6 years status post laparoscopic gastric bypass surgery 2014. She has had weight gain after child birth and wish to get back to lowest post operative weight of 165 lbs. Patient doing well with phentermine, denies side effects of medication.      Weight History    Ideal body weight: 66.2 kg (145 lb 15.1 oz)  Adjusted ideal body weight: 89.3 kg (196 lb 12.3 oz) (Based on [employer] Tables)  Goal weight: 165 lbs     Wt Readings from Last 10 Encounters:   10/21/20 123.8 kg (273 lb)   20 125.9 kg (277 lb 9.6 oz)   20 131.5 kg (290 lb)   20 113.4 kg (250 lb)   19 119.7 kg (264 lb)   08/15/19 117.8 kg (259 lb 12.8 oz)   07/10/19 117 kg (258 lb)   19 119.7 kg (264 lb)   19 120.2 kg (265 lb)   19 120.7 kg (266 lb)       Weight Metrics 10/21/2020 2020 2020 2020 2019 8/15/2019 7/10/2019   Weight 273 lb 277 lb 9.6 oz 290 lb 250 lb 264 lb 259 lb 12.8 oz 258 lb   BMI 40.32 kg/m2 40.99 kg/m2 42.83 kg/m2 36.92 kg/m2 38.99 kg/m2 38.37 kg/m2 38.1 kg/m2             History    Past Medical History:   Diagnosis Date    Anemia     Arrhythmia     palpitations/pt denies    Asthma     Depression     Dizziness     daily     Joint pain     Morbid obesity with BMI of 50.0-59.9, adult ([address]     Shortness of breath     Sleep apnea     Unspecified sleep apnea        Past Surgical History:   Procedure Laterality Date    ABDOMEN SURGERY PROC UNLISTED  2014    gastric bypass with repair hiatal hernia    DELIVERY       x 3    HX GI      gastric bypass    HX GYN      c section x 4    HX ORTHOPAEDIC  2017    R ring finger fx repair, due to scar tissue       Current Outpatient Medications   Medication Sig Dispense Refill    phentermine (ADIPEX-P) 37.5 mg tablet Take 1 Tab by mouth every morning. Max Daily Amount: 37.5 mg. Indications: weight loss management for an obese person 30 Tab 0    ferrous sulfate (Iron) 325 mg (65 mg iron) tablet Take  by mouth Daily (before breakfast).  thiamine HCL (VITAMIN B-1) 100 mg tablet Take  by mouth daily.  calcium citrate-vitamin d2 1,500-200 mg-unit tab Take 500 mg by mouth three (3) times daily.  montelukast (SINGULAIR) 10 mg tablet Take 10 mg by mouth daily.  multivitamin with iron (FLINTSTONES) chewable tablet Take 1 tablet by mouth two (2) times a day.  Cholecalciferol, Vitamin D3, (VITAMIN D3) 2,000 unit cap capsule Take 5,000 Units by mouth daily.  cyanocobalamin (VITAMIN B12) 500 mcg tablet Take 1,000 mcg by mouth two (2) times a day.  albuterol (PROAIR HFA) 90 mcg/actuation inhaler Take 2 Puffs by inhalation as needed.          Allergies   Allergen Reactions    Pollen Extracts Other (comments)     Nasal symptoms       Social History     Tobacco Use    Smoking status: Former Smoker     Last attempt to quit: 2014     Years since quittin.3    Smokeless tobacco: Never Used   Substance Use Topics    Alcohol use: Not Currently     Alcohol/week: 1.7 standard drinks     Types: 2 Standard drinks or equivalent per week     Comment: occassionally    Drug use: No       Family History   Problem Relation Age of Onset    No Known Problems Father     Obesity Mother     Asthma Mother        Family Status   Relation Name Status    Father  Alive    Other Son Alive    Mother  Alive           Review of Systems  Positives in [place]    Constitutional:  Unexpected weight loss, night sweats,fatigue/maliase/lethargy, change in sleep, fever, rash  Eyes:  Visual changes, headaches, eye pain, floaters  ENT:  Rhinorrhea, epistaxis, sinus pain, change in hearing, gingival bleeding, sore throat, dysphagia, dysphonia  CV: Chest pain, shortness of breath, difficulty breathing, orthopnea, palpitations, loss of consciousness, claudication  Resp: Cough, wheeze, haemoptysis, sob, exercise intolerence  GI:  Abdominal pain, dysphagia, reflux, bloating, cramping. Obstipation, haematemesis, brbpr, hematochezia,dark tarry stools. Nausea, vomitting, diarrhea, constipation. : Change in frequency of urination, dysuria, hematuria, change in force of Stream  Neuro: Paresthesias, numbness, weakness, changes in balance, changes in speech, loss of control of bowel or bladder,   Psych:Changes in depression, anxiety, sleep patterns, change in energy Levels  Endocrine: Temperature intolerance, dry skin, hair loss, fatigue,  Episodes of hypoglycemia, changes in libido  Heme: Anemia, pupura, petechia  Skin: Rashes, itchiness, excessive bruising    Remainder of ROS as per HPI. Since your last visit, have you experienced any complications? no  If yes, please list:     Are you taking an appetite suppressant? yes  If so:  Do you need a refill? no  Are you experiencine any Chest Pain, Palpitations or Dizziness? yes    BP Readings from Last 3 Encounters:   10/21/20 114/74   09/21/20 130/86   07/28/20 (!) 127/91           Have you received any other medical care this week? no  If yes, where and for what? Have you discontinued or changed any medicine or dose of your medicine(s) since your last visit with [facility] provider? no    If yes, where and for what? Diet  How many ounces of calorie-free liquids did you consume each day?  64oz    How many meal replacements did you take each day? 0    Did you have any problems adhering to the program?  no   If yes, please explain:      Exercise  Aerobic exercise: 60 to 90 min walk/run 7 days per week  Resistance exercise: 0 workouts / week  Any discomfort?  no     If yes, where?     Objective  Visit Vitals  /74   Pulse 64   Temp 98.2 °F (36.8 °C) (Temporal)   Resp 18   Ht 5' 9\" (1.753 m)   Wt 123.8 kg (273 lb)   LMP 09/29/2020   SpO2 98%   BMI 40.32 kg/m²     Patient's last menstrual period was 09/29/2020. Physical Exam      General: AAOX3, pleasant and cooperative to exam. Appropriately groomed. NAD. Non-toxic in appearance. Appears stated age. HENT: NC/AT. PERRLA. Extraocular motions are intact. Sclera anicteric, Conjunctiva Clear. Nares clear. Oropharynx pink, moist without exudate or erythema. Uvula Midline. Neck:  Trachea is midline. No visible JVD, Lymphadenopathy. Chest: Good equal bilateral expansion  Lungs: Clear to auscultation bilaterally without e/o crackles, wheezes or rhales. Heart: RRR, S1 and S2 noted. No c/r/m/g/vpmi. Abdomen: obese, soft and non-tender without distension. Good bowel sounds. No vis/palp masses or pulsations. No organo-splenomegaly. No hernias to my exam. No e/o acute abdomen or peritoneal signs. :  Deferred  Rectal: Deferred  Extremities:  No C/C/E  Neuro: CN II-XII appear to be grossly intact without focal deficit. Ambulatory. Skin: Clean, warm and dry. Assessment / Plan    Encounter Diagnoses   Name Primary?  History of Antony-en-Y gastric bypass Yes    Weight gain     Morbid obesity with BMI of 40.0-44.9, adult ([address]     Dietary counseling and surveillance     Postoperative malabsorption        1. Weight management    Degree of control: Doing well   Progress was reviewed with patient after 8 weeks in back on track program.    Goal(s) for next appointment:see below            2.  Labs    Latest results reviewed with patient   Routine monitoring labs ordered  Orders Placed This Encounter    phentermine (ADIPEX-P) 37.5 mg tablet       3. Goals   Increase phentermine dose to max dose of 37.5 mg daily   Continue foods mainly meat and vegetables   Eat 3 small meals per day with calorie intake of 1000 per day.      Continue exercise routine    Waist Circumference: I personally reviewed patient's Weight Management Doc Flowsheet  Neck Circumference: I personally reviewed patient's Weight Management Doc Flowsheet  Percent Body Fat: I personally reviewed patient's Weight Management Doc Flowsheet       I have reviewed/discussed the above normal BMI with the patient. I have recommended the following interventions: dietary management education, guidance, and counseling, encourage exercise, monitor weight and prescribed dietary intake . Follow-up and Dispositions    · Return in about 1 month (around 11/21/2020).           >50% of 15 min visit spent counseling     Kalyn Michelle, NICOLÁSP-BC

## 2020-10-21 NOTE — PROGRESS NOTES
[address] is a 26 y.o. female  Chief Complaint   Patient presents with    Follow-up     GBP 8/12/2014     Pt ID confirmed    Weight Loss Metrics 10/21/2020 10/21/2020 9/21/2020 9/21/2020 9/1/2020 9/1/2020 1/28/2020   Pre op / Initial Wt 302 - 302 - 367 - 302   Today's Wt - 273 lb - 277 lb 9.6 oz - 290 lb -   BMI - 40.32 kg/m2 - 40.99 kg/m2 - 42.83 kg/m2 -   Ideal Body Wt 146 - 146 - 146 - 146   Excess Body Wt 156 - 156 - 221 - 156   Goal Wt - - - - 190 - 177   Wt loss to date 29 - 24.4 - 77 - 52   % Wt Loss 0.23 - 0.2 - 0.44 - 0.42   80% .8 - 124.8 - 176.8 - 124.8       Body mass index is 40.32 kg/m².     Post op medications:  MVI: flintstones twice daily  Calcium Citrate: 1500 millgrams  B-1 100 milligrams  B-12 1000 micrograms  Vit D 3 5000 units

## 2020-10-23 NOTE — PATIENT INSTRUCTIONS
Orders Placed This Encounter  phentermine (ADIPEX-P) 37.5 mg tablet Sig: Take 1 Tab by mouth every morning. Max Daily Amount: 37.5 mg. Indications: weight loss management for an obese person Dispense:  30 Tab   Refill:  0

## 2020-11-19 ENCOUNTER — VIRTUAL VISIT (OUTPATIENT)
Dept: SURGERY | Age: 30
End: 2020-11-19
Payer: MEDICAID

## 2020-11-19 VITALS — BODY MASS INDEX: 39.69 KG/M2 | WEIGHT: 268 LBS | HEIGHT: 69 IN

## 2020-11-19 DIAGNOSIS — E66.01 MORBID OBESITY WITH BMI OF 40.0-44.9, ADULT (HCC): ICD-10-CM

## 2020-11-19 DIAGNOSIS — Z98.84 HISTORY OF ROUX-EN-Y GASTRIC BYPASS: ICD-10-CM

## 2020-11-19 DIAGNOSIS — R63.5 WEIGHT GAIN: ICD-10-CM

## 2020-11-19 PROCEDURE — 99213 OFFICE O/P EST LOW 20 MIN: CPT | Performed by: NURSE PRACTITIONER

## 2020-11-19 RX ORDER — PHENTERMINE HYDROCHLORIDE 37.5 MG/1
37.5 TABLET ORAL
Qty: 30 TAB | Refills: 0 | Status: SHIPPED | OUTPATIENT
Start: 2020-11-19 | End: 2020-12-23 | Stop reason: SDUPTHER

## 2020-11-19 NOTE — PROGRESS NOTES
CC: Weight Management    Sarahy Quach is a 26 y.o. female who is here for her  f/up medical provider visit for the pharmaceutical weight loss therapy. The patient is 4 years status post laparoscopic gastric bypass surgery 2014. She has had weight gain after child birth and wish to get back to lowest post operative weight of 165 lbs. Patient doing well with phentermine, denies side effects of medication. She is down 5 lbs since last month visit, and down a total of 22 lbs since starting phentermine.        Weight History    Ideal body weight: 66.2 kg (145 lb 15.1 oz)  Adjusted ideal body weight: 88.3 kg (194 lb 12.3 oz) (Based on [employer] Tables)  Goal weight: 180    Wt Readings from Last 10 Encounters:   20 121.6 kg (268 lb)   10/21/20 123.8 kg (273 lb)   20 125.9 kg (277 lb 9.6 oz)   20 131.5 kg (290 lb)   20 113.4 kg (250 lb)   19 119.7 kg (264 lb)   08/15/19 117.8 kg (259 lb 12.8 oz)   07/10/19 117 kg (258 lb)   19 119.7 kg (264 lb)   19 120.2 kg (265 lb)       Weight Metrics 2020 10/21/2020 2020 2020 2020 2019 8/15/2019   Weight 268 lb 273 lb 277 lb 9.6 oz 290 lb 250 lb 264 lb 259 lb 12.8 oz   BMI 39.58 kg/m2 40.32 kg/m2 40.99 kg/m2 42.83 kg/m2 36.92 kg/m2 38.99 kg/m2 38.37 kg/m2             History    Past Medical History:   Diagnosis Date    Anemia     Arrhythmia     palpitations/pt denies    Asthma     Depression     Dizziness     daily     Joint pain     Morbid obesity with BMI of 50.0-59.9, adult ([address]     Shortness of breath     Sleep apnea     Unspecified sleep apnea        Past Surgical History:   Procedure Laterality Date    ABDOMEN SURGERY PROC UNLISTED  2014    gastric bypass with repair hiatal hernia    DELIVERY       x 3    HX GI      gastric bypass    HX GYN      c section x 4    HX ORTHOPAEDIC  2017    R ring finger fx repair, due to scar tissue Current Outpatient Medications   Medication Sig Dispense Refill    phentermine (ADIPEX-P) 37.5 mg tablet Take 1 Tab by mouth every morning. Max Daily Amount: 37.5 mg. Indications: weight loss management for an obese person 30 Tab 0    ferrous sulfate (Iron) 325 mg (65 mg iron) tablet Take  by mouth Daily (before breakfast).  thiamine HCL (VITAMIN B-1) 100 mg tablet Take  by mouth daily.  calcium citrate-vitamin d2 1,500-200 mg-unit tab Take 500 mg by mouth three (3) times daily.  montelukast (SINGULAIR) 10 mg tablet Take 10 mg by mouth daily.  multivitamin with iron (FLINTSTONES) chewable tablet Take 1 tablet by mouth two (2) times a day.  Cholecalciferol, Vitamin D3, (VITAMIN D3) 2,000 unit cap capsule Take 5,000 Units by mouth daily.  cyanocobalamin (VITAMIN B12) 500 mcg tablet Take 1,000 mcg by mouth two (2) times a day.  albuterol (PROAIR HFA) 90 mcg/actuation inhaler Take 2 Puffs by inhalation as needed.          Allergies   Allergen Reactions    Pollen Extracts Other (comments)     Nasal symptoms       Social History     Tobacco Use    Smoking status: Former Smoker     Last attempt to quit: 2014     Years since quittin.4    Smokeless tobacco: Never Used   Substance Use Topics    Alcohol use: Not Currently     Alcohol/week: 1.7 standard drinks     Types: 2 Standard drinks or equivalent per week     Comment: occassionally    Drug use: No       Family History   Problem Relation Age of Onset    No Known Problems Father     Obesity Mother     Asthma Mother        Family Status   Relation Name Status    Father  Alive    Other Son Alive    Mother  Alive           Review of Systems  Positives in [place]    Constitutional:  Unexpected weight gain/loss, night sweats,fatigue/maliase/lethargy, change in sleep, fever, rash  Eyes:  Visual changes, headaches, eye pain, floaters  ENT:  Rhinorrhea, epistaxis, sinus pain, change in hearing, gingival bleeding, sore throat, dysphagia, dysphonia  CV:  Chest pain, shortness of breath, difficulty breathing, orthopnea, palpitations, loss of consciousness, claudication  Resp: Cough, wheeze, haemoptysis, sob, exercise intolerence  GI:  Abdominal pain, dysphagia, reflux, bloating, cramping. Obstipation, haematemesis, brbpr, hematochezia,dark tarry stools. Nausea, vomitting, diarrhea, constipation. : Change in frequency of urination, dysuria, hematuria, change in force of Stream  Neuro: Paresthesias, numbness, weakness, changes in balance, changes in speech, loss of control of bowel or bladder,   Psych:Changes in depression, anxiety, sleep patterns, change in energy Levels  Endocrine: Temperature intolerance, dry skin, hair loss, fatigue,  Episodes of hypoglycemia, changes in libido  Heme: Anemia, pupura, petechia  Skin: Rashes, itchiness, excessive bruising    Remainder of ROS as per HPI. Since your last visit, have you experienced any complications? no  If yes, please list:     Are you taking an appetite suppressant? yes  If so:  Do you need a refill? yes  Are you experiencine any Chest Pain, Palpitations or Dizziness? no    BP Readings from Last 3 Encounters:   10/21/20 114/74   09/21/20 130/86   07/28/20 (!) 127/91         Have you received any other medical care this week? no  If yes, where and for what? Have you discontinued or changed any medicine or dose of your medicine(s) since your last visit with [facility] provider? no    If yes, where and for what? Diet  How many ounces of calorie-free liquids did you consume each day? 64 oz      Exercise  Aerobic exercise: 30 min walks daily  Any discomfort?  no     If yes, where? Objective  Visit Vitals  Ht 5' 9\" (1.753 m)   Wt 121.6 kg (268 lb)   BMI 39.58 kg/m²     No LMP recorded.     Vital Signs: (As obtained by patient/caregiver at home)  Visit Vitals  Ht 5' 9\" (1.753 m)   Wt 121.6 kg (268 lb)   BMI 39.58 kg/m²        Constitutional: [x] Appears well-developed and well-nourished [x] No apparent distress      [] Abnormal -     Mental status: [x] Alert and awake  [x] Oriented to person/place/time [x] Able to follow commands    [] Abnormal -     Eyes:   EOM    [x]  Normal    [] Abnormal -   Sclera  [x]  Normal    [] Abnormal -          Discharge [x]  None visible   [] Abnormal -     HENT: [x] Normocephalic, atraumatic  [] Abnormal -   [x] Mouth/Throat: Mucous membranes are moist    External Ears [x] Normal  [] Abnormal -    Neck: [x] No visualized mass [] Abnormal -     Pulmonary/Chest: [x] Respiratory effort normal   [x] No visualized signs of difficulty breathing or respiratory distress        [] Abnormal -      Musculoskeletal:   [x] Normal gait with no signs of ataxia         [x] Normal range of motion of neck        [] Abnormal -     Neurological:        [x] No Facial Asymmetry (Cranial nerve 7 motor function) (limited exam due to video visit)          [x] No gaze palsy        [] Abnormal -          Skin:        [x] No significant exanthematous lesions or discoloration noted on facial skin         [] Abnormal -            Psychiatric:       [x] Normal Affect [] Abnormal -        [x] No Hallucinations        Assessment / Plan    Encounter Diagnoses   Name Primary?  History of Antony-en-Y gastric bypass     Weight gain     Morbid obesity with BMI of 40.0-44.9, adult ([facility] [address]        1. Weight management    Degree of control: doing well   Progress was reviewed with patient after 12 weeks   Goal(s) for next appointment: continue phentermine          2. Labs    Latest results reviewed with patient   Routine monitoring labs ordered  Orders Placed This Encounter    phentermine (ADIPEX-P) 37.5 mg tablet       3. Goals   Continue phentermine   Eat 3 to 4 small meals per day of mainly protein and vegetables   Exercise a minimum of 150  Min per week         I have reviewed/discussed the above normal BMI with the patient.   I have recommended the following interventions: dietary management education, guidance, and counseling, encourage exercise, monitor weight and prescribed dietary intake . Coding Help - Use CPT Codes 84581-10590, 94001-37184 for Established and New Patients respectively, either employing EM elements or Time rules. Other codes (example consult codes) may also apply. I spent at least 15 minutes with this established patient, and >50% of the time was spent counseling and/or coordinating care regarding weight loss strategies     We discussed the expected course, resolution and complications of the diagnosis(es) in detail. Medication risks, benefits, costs, interactions, and alternatives were discussed as indicated. I advised her to contact the office if her condition worsens, changes or fails to improve as anticipated. She expressed understanding with the diagnosis(es) and plan. Pursuant to the emergency declaration under the [facility], [facility] waiver authority and the [employer] General Act, this Virtual  Visit was conducted, with patient's consent, to reduce the patient's risk of exposure to COVID-19 and provide continuity of care for an established patient. Services were provided through a video synchronous discussion virtually to substitute for in-person clinic visit.         William Ontiveros NP

## 2020-11-19 NOTE — PROGRESS NOTES
[id number] DerekCristino Vázquez is a 26 y.o. female  Chief Complaint   Patient presents with    Follow-up     GBP 8/12/2014. weight chec and refill of medication     Pt ID confirmed    Weight Loss Metrics 11/19/2020 11/19/2020 10/21/2020 10/21/2020 9/21/2020 9/21/2020 9/1/2020   Pre op / Initial Wt 302 - 302 - 302 - 367   Today's Wt - 268 lb - 273 lb - 277 lb 9.6 oz -   BMI - 39.58 kg/m2 - 40.32 kg/m2 - 40.99 kg/m2 -   Ideal Body Wt 146 - 146 - 146 - 146   Excess Body Wt 156 - 156 - 156 - 221   Goal Wt - - - - - - 190   Wt loss to date 34 - 29 - 24.4 - 77   % Wt Loss 0.27 - 0.23 - 0.2 - 0.44   80% .8 - 124.8 - 124.8 - 176.8       Body mass index is 39.58 kg/m².     Post op medications:  MVI: flintstones twice daily  Calcium Citrate: 1500 milligrams  B-1 100 milligrams  B-12 1000 micrograms  Vit D 3 5000 units

## 2020-12-03 ENCOUNTER — HOSPITAL ENCOUNTER (OUTPATIENT)
Dept: BARIATRICS/WEIGHT MGMT | Age: 30
Discharge: HOME | End: 2020-12-03

## 2020-12-23 ENCOUNTER — OFFICE VISIT (OUTPATIENT)
Dept: SURGERY | Age: 30
End: 2020-12-23
Payer: MEDICAID

## 2020-12-23 VITALS
TEMPERATURE: 98 F | DIASTOLIC BLOOD PRESSURE: 84 MMHG | WEIGHT: 262 LBS | SYSTOLIC BLOOD PRESSURE: 130 MMHG | RESPIRATION RATE: 18 BRPM | HEIGHT: 69 IN | OXYGEN SATURATION: 99 % | BODY MASS INDEX: 38.8 KG/M2 | HEART RATE: 74 BPM

## 2020-12-23 DIAGNOSIS — R63.5 WEIGHT GAIN: ICD-10-CM

## 2020-12-23 DIAGNOSIS — K91.2 POSTOPERATIVE MALABSORPTION: ICD-10-CM

## 2020-12-23 DIAGNOSIS — Z98.84 HISTORY OF ROUX-EN-Y GASTRIC BYPASS: Primary | ICD-10-CM

## 2020-12-23 DIAGNOSIS — Z98.84 HISTORY OF ROUX-EN-Y GASTRIC BYPASS: ICD-10-CM

## 2020-12-23 DIAGNOSIS — E66.01 MORBID OBESITY WITH BMI OF 40.0-44.9, ADULT (HCC): ICD-10-CM

## 2020-12-23 PROCEDURE — 99213 OFFICE O/P EST LOW 20 MIN: CPT | Performed by: NURSE PRACTITIONER

## 2020-12-23 RX ORDER — PHENTERMINE HYDROCHLORIDE 37.5 MG/1
37.5 TABLET ORAL
Qty: 30 TAB | Refills: 0 | Status: SHIPPED | OUTPATIENT
Start: 2020-12-23

## 2020-12-23 NOTE — PROGRESS NOTES
[facility] Progress Note:   F/up Provider Visit     CC: Weight Management      Rico Andrew is a 26 y.o. female who is here for her  f/up medical provider visit for weight loss management. Patient is here for her f/up medical provider visit for the pharmaceutical weight loss therapy. The patient is 4 years status post laparoscopic gastric bypass surgery 2014. She is doing well since taking phentermine without side effects. She is down 29 lbs from start of phentermine 2 months ago.       Weight History    Ideal body weight: 66.2 kg (145 lb 15.1 oz)  Adjusted ideal body weight: 87.3 kg (192 lb 5.9 oz) (Based on [employer] Tables)      Wt Readings from Last 10 Encounters:   20 118.8 kg (262 lb)   20 121.6 kg (268 lb)   10/21/20 123.8 kg (273 lb)   20 125.9 kg (277 lb 9.6 oz)   20 131.5 kg (290 lb)   20 113.4 kg (250 lb)   19 119.7 kg (264 lb)   08/15/19 117.8 kg (259 lb 12.8 oz)   07/10/19 117 kg (258 lb)   19 119.7 kg (264 lb)       Weight Metrics 2020 2020 10/21/2020 2020 2020 2020 2019   Weight 262 lb 268 lb 273 lb 277 lb 9.6 oz 290 lb 250 lb 264 lb   BMI 38.69 kg/m2 39.58 kg/m2 40.32 kg/m2 40.99 kg/m2 42.83 kg/m2 36.92 kg/m2 38.99 kg/m2             History    Past Medical History:   Diagnosis Date    Anemia     Arrhythmia     palpitations/pt denies    Asthma     Depression     Dizziness     daily     Joint pain     Morbid obesity with BMI of 50.0-59.9, adult ([address]     Shortness of breath     Sleep apnea     Unspecified sleep apnea        Past Surgical History:   Procedure Laterality Date    ABDOMEN SURGERY PROC UNLISTED  2014    gastric bypass with repair hiatal hernia    DELIVERY       x 3    HX GI      gastric bypass    HX GYN      c section x 4    HX ORTHOPAEDIC  2017    R ring finger fx repair, due to scar tissue       Current Outpatient Medications   Medication Sig Dispense Refill    phentermine (ADIPEX-P) 37.5 mg tablet Take 1 Tab by mouth every morning. Max Daily Amount: 37.5 mg. Indications: weight loss management for an obese person 30 Tab 0    ferrous sulfate (Iron) 325 mg (65 mg iron) tablet Take  by mouth Daily (before breakfast).  thiamine HCL (VITAMIN B-1) 100 mg tablet Take  by mouth daily.  calcium citrate-vitamin d2 1,500-200 mg-unit tab Take 500 mg by mouth three (3) times daily.  montelukast (SINGULAIR) 10 mg tablet Take 10 mg by mouth daily.  multivitamin with iron (FLINTSTONES) chewable tablet Take 1 tablet by mouth two (2) times a day.  Cholecalciferol, Vitamin D3, (VITAMIN D3) 2,000 unit cap capsule Take 5,000 Units by mouth daily.  cyanocobalamin (VITAMIN B12) 500 mcg tablet Take 1,000 mcg by mouth two (2) times a day.  albuterol (PROAIR HFA) 90 mcg/actuation inhaler Take 2 Puffs by inhalation as needed. Allergies   Allergen Reactions    Pollen Extracts Other (comments)     Nasal symptoms       Social History     Tobacco Use    Smoking status: Former Smoker     Quit date: 2014     Years since quittin.5    Smokeless tobacco: Never Used   Substance Use Topics    Alcohol use: Not Currently     Alcohol/week: 1.7 standard drinks     Types: 2 Standard drinks or equivalent per week     Comment: occassionally    Drug use: No       Family History   Problem Relation Age of Onset    No Known Problems Father     Obesity Mother     Asthma Mother        Family Status   Relation Name Status    Father  Alive    Other Son Alive    Mother  Alive           Since your last visit, have you experienced any complications? no  If yes, please list:     Are you taking an appetite suppressant?  yes  If so:  Do you need a refill? yes  Are you experiencine any Chest Pain, Palpitations or Dizziness? no    BP Readings from Last 3 Encounters:   20 130/84   10/21/20 114/74   20 130/86 Have you received any other medical care this week? no  If yes, where and for what? Have you discontinued or changed any medicine or dose of your medicine(s) since your last visit with [facility] provider? no    If yes, where and for what? Diet  How many ounces of calorie-free liquids did you consume each day? 64 oz        Objective  Visit Vitals  /84   Pulse 74   Temp 98 °F (36.7 °C) (Temporal)   Resp 18   Ht 5' 9\" (1.753 m)   Wt 118.8 kg (262 lb)   LMP 12/17/2020 (Exact Date)   SpO2 99%   BMI 38.69 kg/m²     Patient's last menstrual period was 12/17/2020 (exact date). Physical Exam      General: AAOX3, pleasant and cooperative to exam. Appropriately groomed. NAD. Non-toxic in appearance. Appears stated age. Neck:  Trachea is midline. No visible JVD, Lymphadenopathy. Chest: Good equal bilateral expansion  Lungs: Clear to auscultation bilaterally without e/o crackles, wheezes or rhales. Heart: RRR, S1 and S2 noted. No c/r/m/g/vpmi. Neuro: CN II-XII appear to be grossly intact without focal deficit. Ambulatory. Skin: Clean, warm and dry. Assessment / Plan    Encounter Diagnoses   Name Primary?  History of Antony-en-Y gastric bypass Yes    Weight gain     Postoperative malabsorption     Morbid obesity with BMI of 40.0-44.9, adult ([facility] [address]          2.  Labs    Latest results reviewed with patient   Routine monitoring labs ordered  Orders Placed This Encounter    CBC WITH AUTOMATED DIFF    IRON    VITAMIN B1, WHOLE BLOOD    METABOLIC PANEL, COMPREHENSIVE    VITAMIN B12 & FOLATE    LIPID PANEL    phentermine (ADIPEX-P) 37.5 mg tablet       3.  Goals   Continue phentermine 37.5 mg daily   Monthly provider visit   -10 lbs for next visit   Obtain bariatric labs in August for Annual follow up    Resume exercise routine     Waist Circumference: I personally reviewed patient's Weight Management Doc Flowsheet  Neck Circumference: I personally reviewed patient's Weight Management Doc Flowsheet  Percent Body Fat: I personally reviewed patient's Weight Management Doc Flowsheet       I have reviewed/discussed the above normal BMI with the patient. I have recommended the following interventions: dietary management education, guidance, and counseling, encourage exercise, monitor weight and prescribed dietary intake . Follow-up and Dispositions    · Return in about 1 month (around 1/23/2021).           >50% of 15 min visit spent counseling     CRYSTAL Sweeney-BC

## 2020-12-23 NOTE — PROGRESS NOTES
[id number] DerekCristino Vázquez is a 26 y.o. female  Chief Complaint   Patient presents with    Follow-up     GBP 8/12/2014 weight check and med refill     Pt ID confirmed    Weight Loss Metrics 12/23/2020 12/23/2020 11/19/2020 11/19/2020 10/21/2020 10/21/2020 9/21/2020   Pre op / Initial Wt 302 - 302 - 302 - 302   Today's Wt - 262 lb - 268 lb - 273 lb -   BMI - 38.69 kg/m2 - 39.58 kg/m2 - 40.32 kg/m2 -   Ideal Body Wt 146 - 146 - 146 - 146   Excess Body Wt 156 - 156 - 156 - 156   Goal Wt - - - - - - -   Wt loss to date 40 - 34 - 29 - 24.4   % Wt Loss 0.32 - 0.27 - 0.23 - 0.2   80% .8 - 124.8 - 124.8 - 124.8       Body mass index is 38.69 kg/m².     Post op medications:  MVI: flintstones twice daily  Calcium Citrate: 1500 milligrams  B-1 100 milligrams  B-12 1000 micrograms  Vit D 3 5000 units

## 2021-02-17 ENCOUNTER — DOCUMENTATION ONLY (OUTPATIENT)
Dept: BARIATRICS/WEIGHT MGMT | Age: 31
End: 2021-02-17

## 2021-02-17 NOTE — PROGRESS NOTES
2/17/2021:  Patient did not show for her follow up nutrition appointment. She was left a voicemail to reschedule.     No Bacon MS RD

## 2021-04-16 LAB
25(OH)D3+25(OH)D2 SERPL-MCNC: 50.9 NG/ML (ref 30–100)
BASOPHILS # BLD AUTO: 0.1 X10E3/UL (ref 0–0.2)
BASOPHILS NFR BLD AUTO: 1 %
BUN SERPL-MCNC: 8 MG/DL (ref 6–20)
BUN/CREAT SERPL: 10 (ref 9–23)
CALCIUM SERPL-MCNC: 9.7 MG/DL (ref 8.7–10.2)
CHLORIDE SERPL-SCNC: 105 MMOL/L (ref 96–106)
CHOLEST SERPL-MCNC: 169 MG/DL (ref 100–199)
CO2 SERPL-SCNC: 25 MMOL/L (ref 20–29)
CREAT SERPL-MCNC: 0.83 MG/DL (ref 0.57–1)
EOSINOPHIL # BLD AUTO: 0.3 X10E3/UL (ref 0–0.4)
EOSINOPHIL NFR BLD AUTO: 4 %
ERYTHROCYTE [DISTWIDTH] IN BLOOD BY AUTOMATED COUNT: 13.8 % (ref 11.7–15.4)
FOLATE SERPL-MCNC: 10.9 NG/ML
GLUCOSE SERPL-MCNC: 95 MG/DL (ref 65–99)
HCT VFR BLD AUTO: 38.3 % (ref 34–46.6)
HDLC SERPL-MCNC: 58 MG/DL
HGB BLD-MCNC: 11.9 G/DL (ref 11.1–15.9)
IMM GRANULOCYTES # BLD AUTO: 0 X10E3/UL (ref 0–0.1)
IMM GRANULOCYTES NFR BLD AUTO: 0 %
IRON SATN MFR SERPL: 11 % (ref 15–55)
IRON SERPL-MCNC: 37 UG/DL (ref 27–159)
LDLC SERPL CALC-MCNC: 97 MG/DL (ref 0–99)
LYMPHOCYTES # BLD AUTO: 2.3 X10E3/UL (ref 0.7–3.1)
LYMPHOCYTES NFR BLD AUTO: 36 %
MAGNESIUM SERPL-MCNC: 2 MG/DL (ref 1.6–2.3)
MCH RBC QN AUTO: 26.4 PG (ref 26.6–33)
MCHC RBC AUTO-ENTMCNC: 31.1 G/DL (ref 31.5–35.7)
MCV RBC AUTO: 85 FL (ref 79–97)
MONOCYTES # BLD AUTO: 0.6 X10E3/UL (ref 0.1–0.9)
MONOCYTES NFR BLD AUTO: 10 %
NEUTROPHILS # BLD AUTO: 3.2 X10E3/UL (ref 1.4–7)
NEUTROPHILS NFR BLD AUTO: 49 %
PLATELET # BLD AUTO: 322 X10E3/UL (ref 150–450)
POTASSIUM SERPL-SCNC: 3.9 MMOL/L (ref 3.5–5.2)
RBC # BLD AUTO: 4.5 X10E6/UL (ref 3.77–5.28)
SODIUM SERPL-SCNC: 141 MMOL/L (ref 134–144)
SPECIMEN STATUS REPORT, ROLRST: NORMAL
TIBC SERPL-MCNC: 334 UG/DL (ref 250–450)
TRIGL SERPL-MCNC: 72 MG/DL (ref 0–149)
UIBC SERPL-MCNC: 297 UG/DL (ref 131–425)
VIT B1 BLD-SCNC: 147.8 NMOL/L (ref 66.5–200)
VIT B12 SERPL-MCNC: >2000 PG/ML (ref 232–1245)
VLDLC SERPL CALC-MCNC: 14 MG/DL (ref 5–40)
WBC # BLD AUTO: 6.4 X10E3/UL (ref 3.4–10.8)

## 2021-12-21 ENCOUNTER — DOCUMENTATION ONLY (OUTPATIENT)
Dept: PULMONOLOGY | Age: 31
End: 2021-12-21

## 2021-12-21 NOTE — PROGRESS NOTES
Spoke to pt to schedule a new pt sleep apt-pt wants something closer to her, [place] is too far to come.

## 2022-02-09 ENCOUNTER — HOSPITAL ENCOUNTER (OUTPATIENT)
Dept: PHYSICAL THERAPY | Age: 32
Discharge: HOME | End: 2022-02-09
Payer: MEDICAID

## 2022-02-09 PROCEDURE — 97161 PT EVAL LOW COMPLEX 20 MIN: CPT

## 2022-02-09 NOTE — PROGRESS NOTES
PHYSICAL THERAPY - DAILY TREATMENT NOTE      Patient Name: Harriett Leavitt        Date: 2022  : 1990   YES Patient  Verified  Visit #:   1   of   8  Insurance: Payor: Ruben Cuevas / Plan: [facility] / Product Type: Managed Care Medicaid /      In time: 3:30 Out time: 4:10   Total Treatment Time: 40     Medicare Time/BCBS Tracking (below)   Total Timed Codes (min):  n/a 1:1 Treatment Time:  n/a     TREATMENT AREA = Left knee pain [M25.562]     SUBJECTIVE    Pain Level (on 0 to 10 scale):  5  / 10   Medication Changes/New allergies or changes in medical history, any new surgeries or procedures?     NO    If yes, update Summary List   Subjective Functional Status/Changes:  []  No changes reported       See Plan of Care       OBJECTIVE  Modalities Rationale:     decrease pain to improve patient's ability to perform ADLs/work   min [] Estim, type/location:                                      []  att     []  unatt     []  w/US     []  w/ice    []  w/heat    min []  Mechanical Traction: type/lbs                   []  pro   []  sup   []  int   []  cont    []  before manual    []  after manual    min []  Ultrasound, settings/location:      min []  Iontophoresis w/ dexamethasone, location:                                               []  take home patch       []  in clinic   10 min [x]  Ice     []  Heat    location/position: Supine left knee    min []  Vasopneumatic Device, press/temp:        min []  Other:    [x] Skin assessment post-treatment (if applicable):    []  intact    [x]  redness- no adverse reaction                  []redness - adverse reaction:        10 min Self Care:    Rationale:    increase ROM and increase strength to improve the patients ability to perform ADLs/work    Billed With/As:   [] TE   [] TA   [] Neuro   [x] Self Care Patient Education: [x] Review HEP    [] Progressed/Changed HEP based on:   [] positioning   [] body mechanics   [] transfers   [] heat/ice application    [] other:      Other Objective/Functional Measures:    See Plan of Care     Post Treatment Pain Level (on 0 to 10) scale:   5  / 10     ASSESSMENT    Assessment/Changes in Function:     See Plan of Care     []  See Progress Note/Recertification   Patient will continue to benefit from skilled PT services to modify and progress therapeutic interventions, address functional mobility deficits, address ROM deficits, address strength deficits, analyze and address soft tissue restrictions, analyze and cue movement patterns, analyze and modify body mechanics/ergonomics and assess and modify postural abnormalities to attain remaining goals. to attain remaining goals.    Progress toward goals / Updated goals:    See Plan of Care     PLAN    [x]  Upgrade activities as tolerated YES Continue plan of care   []  Discharge due to :    []  Other:      Therapist: Layo Sanchez PT    Date: 2/9/2022 Time: 4:13 PM     Future Appointments   Date Time Provider Diana Keene   2/23/2022  3:00 PM Lorna Fonseca, PT [id number]   2/25/2022  2:15 PM Sarlorenzoa Rj [facility]   2/28/2022  3:00 PM Lorna Fonseca, PT [facility]   3/2/2022  3:30 PM Arelis Lashmeet [facility]   3/7/2022  3:00 PM Lorna Fonseca, PT [facility]   3/10/2022  3:00 PM Saratha Skains [facility]   3/14/2022  3:00 PM Lorna Fonseca, PT [facility]   3/17/2022  3:00 PM Rony Ly

## 2022-02-09 NOTE — PROGRESS NOTES
[address], [place] [address],[facility], [address] [facility] - Phone: [phone]  Fax: [phone] / 9682 [facility]  Patient Name: Ruthann Cha : 1990   Medical   Diagnosis: Left knee pain [M25.562] Treatment Diagnosis: Left Knee Pain   Onset Date: 22     Referral Source: Oscar Jensen MD [facility]): 2022   Prior Hospitalization: See medical history Provider #: [id number]   Prior Level of Function: Hx right knee subluxation   Comorbidities: Asthma, bariatric surgery   Medications: Verified on Patient Summary List   The Plan of Care and following information is based on the information from the initial evaluation.   ===========================================================================================  Assessment / moya information:  Ruthann Cha is a 33 y.o.  yo female with Dx of Left knee pain [M25.562]. Patient reports onset of symptoms while playing basketball on 22. Patient reported \"pop\" and described a possible subluxation of left patella. Reports of mild edema lateral knee. Patient currently rates pain as 9/10 at worst, 3/10 at best, primarily located at left lateral knee. Patient complains of difficulty and increase pain with walking, sit to stand, stair negotiation and squatting with reports of intermittent edema. Objective Findings:  Left Knee AROM: 0-135 degrees with pain at end range flexion. Manual Muscle Testing: Left quad set reduced, knee ext and flex 4/5, hip abd, add, ext 4/5. Gait: decreased minh, left LE step length and decreased left hip extension. Complaints of increased anterior shin pain, most likely due to gait abnormalities and overuse of dorsiflexors. Special Tests: Negative testing for meniscus liagmentous involvement. Left lateral tracking patella noted. FOTO Score= 48 points.   Pt instructed in HEP and will f/u in clinic for PT.  ===========================================================================================  Eval Complexity: History HIGH Complexity :3+ comorbidities / personal factors will impact the outcome/ POC ;  Examination  HIGH Complexity : 4+ Standardized tests and measures addressing body structure, function, activity limitation and / or participation in recreation ; Presentation LOW Complexity : Stable, uncomplicated ;  Decision Making MEDIUM Complexity : FOTO score of 26-74; Overall Complexity LOW   Problem List: pain affecting function, decrease ROM, decrease strength, impaired gait/ balance, decrease ADL/ functional abilitiies, decrease activity tolerance and decrease flexibility/ joint mobility   Treatment Plan may include any combination of the following: Therapeutic exercise, Therapeutic activities, Neuromuscular re-education, Physical agent/modality, Manual therapy and Patient education  Patient / Family readiness to learn indicated by: asking questions, trying to perform skills and interest  Persons(s) to be included in education: patient (P)  Barriers to Learning/Limitations: None  Measures taken, if barriers to learning:    Patient Goal (s): \"I am able to get back to moving my knee without pain. \"   Patient self reported health status: good  Rehabilitation Potential: good   Short Term Goals: To be accomplished in  2  weeks:  1. 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 2. Patient will achieve +2 on GROC to improve tolerance to stair negotiation. 3. Patient will report 3/10 pain at worst to improve tolerance to ADLs.  Long Term Goals: To be accomplished in  4  weeks:  1. 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 2. Patient will achieve +4 on GROC to improve tolerance to stair negotiation.   3. Patient will report 1/10 pain at worst to improve tolerance to ADLs.  Frequency / Duration:   Patient to be seen  2  times per week for 4  weeks:  Patient / Caregiver education and instruction: exercises  Therapist Signature: Mathew Bland PT Date: 8/6/6073   Certification Period: n/a Time: 4:19 PM   ===========================================================================================  I certify that the above Physical Therapy Services are being furnished while the patient is under my care. I agree with the treatment plan and certify that this therapy is necessary. Physician Signature:        Date:       Time:                                        Perry Purdy MD  Please sign and return to [facility] at [facility], [facility]. or you may fax the signed copy to [phone]. Thank you.

## 2022-02-23 ENCOUNTER — HOSPITAL ENCOUNTER (OUTPATIENT)
Dept: PHYSICAL THERAPY | Age: 32
Discharge: HOME | End: 2022-02-23
Payer: MEDICAID

## 2022-02-23 PROCEDURE — 97112 NEUROMUSCULAR REEDUCATION: CPT

## 2022-02-23 PROCEDURE — 97110 THERAPEUTIC EXERCISES: CPT

## 2022-02-23 PROCEDURE — 97535 SELF CARE MNGMENT TRAINING: CPT

## 2022-02-23 PROCEDURE — 97530 THERAPEUTIC ACTIVITIES: CPT

## 2022-02-23 NOTE — PROGRESS NOTES
PHYSICAL THERAPY - DAILY TREATMENT NOTE      Patient Name: Erika Ornelas        Date: 2022  : 1990   YES Patient  Verified  Visit #:   2   of   8  Insurance: Payor: Logan Leyvaeker / Plan: [facility] / Product Type: Managed Care Medicaid /      In time: 12:30 Out time: 1:27   Total Treatment Time: 57     TREATMENT AREA = Left knee pain [M25.562]     SUBJECTIVE  Pain Level (on 0 to 10 scale):  6  / 10   Medication Changes/New allergies or changes in medical history, any new surgeries or procedures? NO    If yes, update Summary List   Subjective Functional Status/Changes:  []  No changes reported     Reports no change since IE. Notes she has been off her feet for the past 5 days because of pain, however she goes back to work tomorrow. OBJECTIVE  Modalities Rationale:     decrease pain to improve patient's ability to perform ADLs/work   min [] Estim, type/location:                                      []  att     []  unatt     []  w/US     []  w/ice    []  w/heat    min []  Mechanical Traction: type/lbs                   []  pro   []  sup   []  int   []  cont    []  before manual    []  after manual    min []  Ultrasound, settings/location:      min []  Iontophoresis w/ dexamethasone, location:                                               []  take home patch       []  in clinic   10 min [x]  Ice     []  Heat    location/position: Supine left knee    min []  Vasopneumatic Device, press/temp:        min []  Other:    [x] Skin assessment post-treatment (if applicable):    []  intact    [x]  redness- no adverse reaction                  []redness - adverse reaction:        15 min Therapeutic Exercise: [x]  See flow sheet   Rationale:    increase strength and improve coordination to improve the patients ability to perform transfers and ADLs. 4 min Manual Therapy: TPR along popliteus and lateral gastroc, STM for medial HS.    Rationale:      decrease pain and decrease trigger points to improve patient's ability to bend knee and ambulate. The manual therapy interventions were performed at a separate and distinct time from the therapeutic activities interventions. 10 min Therapeutic Activity: [x]  See flow sheet   Rationale:    increase strength and improve coordination to improve the patients ability to perform transfers and ADLs. 8 min Neuromuscular Re-ed: [x]  See flow sheet   Rationale:    improve coordination to improve the patients ability to recruit quad for improved function and ambulation. 10 min Self Care: HEP review, POC, prognosis/diagnosis, brace use and wear; KT tape techniques   Rationale:    increase ROM and increase strength to improve the patients ability to perform ADLs/work    Billed With/As:   [] TE   [] TA   [] Neuro   [x] Self Care Patient Education: [x] Review HEP    [] Progressed/Changed HEP based on:   [] positioning   [] body mechanics   [] transfers   [] heat/ice application    [] other:      Other Objective/Functional Measures:    See flow sheet for exercises performed. Post Treatment Pain Level (on 0 to 10) scale:   5  / 10     ASSESSMENT  Assessment/Changes in Function:     Chart reviewed and subjective taken. Note sig instability with patellar tracking and knee complex. MT to decrease tight HS and popliteal space f/b therex to strengthen around knee. Will attempt KT tape NV to improve patellar tracking. Spoke to pt around different kinds of braces and when to where one due to job activities and continued pain levels.      []  See Progress Note/Recertification   Patient will continue to benefit from skilled PT services to modify and progress therapeutic interventions, address functional mobility deficits, address ROM deficits, address strength deficits, analyze and address soft tissue restrictions, analyze and cue movement patterns, analyze and modify body mechanics/ergonomics and assess and modify postural abnormalities to attain remaining goals. to attain remaining goals. Progress toward goals / Updated goals: · Short Term Goals: To be accomplished in  2  weeks:  1. 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 2. Patient will achieve +2 on GROC to improve tolerance to stair negotiation. 3. Patient will report 3/10 pain at worst to improve tolerance to ADLs. · Long Term Goals: To be accomplished in  4  weeks:  1. 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 2. Patient will achieve +4 on GROC to improve tolerance to stair negotiation. 3. Patient will report 1/10 pain at worst to improve tolerance to ADLs. Pt is compliant with HEP thus far.      PLAN  [x]  Upgrade activities as tolerated YES Continue plan of care   []  Discharge due to :    []  Other:      Therapist: Ivelisse Fajardo PT    Date: 2/23/2022 Time: 4:13 PM     Future Appointments   Date Time Provider Diana Keene   2/23/2022 12:30 PM Sofía Carbone, PT [id number]   2/25/2022  2:15 PM Derl Pancho [facility]   2/28/2022  3:00 PM Sofía Carbone,  [facility]   3/2/2022  3:30 PM Misha Dean [facility]   3/7/2022  3:00 PM Sofía Carbone,  [address] [facility]   3/10/2022  3:00 PM Derl Pancho [facility]   3/14/2022  3:00 PM Sofía Carbone,  [address] [facility]   3/17/2022  3:00 PM Cassie Raya

## 2022-02-25 ENCOUNTER — HOSPITAL ENCOUNTER (OUTPATIENT)
Dept: PHYSICAL THERAPY | Age: 32
Discharge: HOME | End: 2022-02-25
Payer: MEDICAID

## 2022-02-25 PROCEDURE — 97112 NEUROMUSCULAR REEDUCATION: CPT

## 2022-02-25 PROCEDURE — 97535 SELF CARE MNGMENT TRAINING: CPT

## 2022-02-25 PROCEDURE — 97110 THERAPEUTIC EXERCISES: CPT

## 2022-02-25 NOTE — PROGRESS NOTES
PHYSICAL THERAPY - DAILY TREATMENT NOTE      Patient Name: Tracy Reed        Date: 2022  : 1990   YES Patient  Verified  Visit #:   3   of   8  Insurance: Payor: Vladimir Blackwell / Plan: [address] [facility] / Product Type: Managed Care Medicaid /      In time: 2:09 Out time: 2:58   Total Treatment Time: 49     Medicare/BCBS Time Tracking (below)   Total Timed Codes (min):  39 1:1 Treatment Time:  n/a     TREATMENT AREA = Left knee pain [M25.562]     SUBJECTIVE  Pain Level (on 0 to 10 scale):  4  / 10   Medication Changes/New allergies or changes in medical history, any new surgeries or procedures? NO    If yes, update Summary List   Subjective Functional Status/Changes:  []  No changes reported     Patient reports not having too much pain     SEE PN     OBJECTIVE  Modalities Rationale:     decrease pain to improve patient's ability to perform ADLs/work   min [] Estim, type/location:                                      []  att     []  unatt     []  w/US     []  w/ice    []  w/heat    min []  Mechanical Traction: type/lbs                   []  pro   []  sup   []  int   []  cont    []  before manual    []  after manual    min []  Ultrasound, settings/location:      min []  Iontophoresis w/ dexamethasone, location:                                               []  take home patch       []  in clinic   10 min [x]  Ice     []  Heat    location/position: Supine left knee    min []  Vasopneumatic Device, press/temp:        min []  Other:    [x] Skin assessment post-treatment (if applicable):    []  intact    [x]  redness- no adverse reaction                  []redness - adverse reaction:        10 min Therapeutic Exercise: [x]  See flow sheet   Rationale:    increase strength and improve coordination to improve the patients ability to perform transfers and ADLs.     10 min Neuromuscular Re-ed: [x]  See flow sheet   Rationale:    improve coordination to improve the patients ability to recruit quad for improved function and ambulation. 19 min Self Care: POC/biomechanical reassessment. Issued and reviewed updated HEP. Reviewed current diagnosis, prognosis, and updated POC (patient goals, PT goals, and treatment). Jersey nunez education    Rationale:  to improve understanding of current diagnosis with realistic expectation of PT to improve compliance/adherence and satisfaction. Billed With/As:   [x] TE   [] TA   [] Neuro   [] Self Care Patient Education: [x] Review HEP:   [employer] Access Code Date Issued   [facility] 02/25 with yellow miniband     [x] Progressed/Changed HEP based on:   [] Addressed barriers and behaviors     [] Therapeutic Neuroscience Pain Education, metaphor, reframing, contexts. [] Sleep Education   [] Body Mechanics [] Healing Timeframe     [] Self STM with ball at \"the spot\"  [] Walking Program/Global Activity   [] other:        Other Objective/Functional Measures:    Access Code: [email or web address]  URL: [email or web address]  Date: 02/25/2022  Prepared by: Darling Sanchez    Program Notes  PERFORM ALL EXERCISES TO YOUR TOLERANCE. IF SHARP PAIN OR RED FLAGS OCCUR, IMMEDIATELY STOP EXERCISE.       Exercises  USE YELLOW LOOPED BAND AROUND KNEES - Supine Bridge with Resistance Band - 2 x daily - 5-7 x weekly - 15 reps - 5 hold  Supine Bridge with Mini Swiss Ball (OR COUCH PILLOW) Between Knees - 2 x daily - 5-7 x weekly - 15 reps - 5 hold  Supine Quad Set - 2 x daily - 5-7 x weekly - 15 reps - 5 hold  Active Straight Leg Raise with Quad Set - 2 x daily - 5-7 x weekly - 2 sets - 10 reps  USE YELLOW LOOPED BAND AROUND KNEES - Squat with Chair Touch - 1-2 x daily - 4-5 x weekly - 2 sets - 10 reps    SEE PN   Post Treatment Pain Level (on 0 to 10) scale:   5  / 10     ASSESSMENT  Assessment/Changes in Function:     SEE PN     []  See Progress Note/Recertification   Patient will continue to benefit from skilled PT services to modify and progress therapeutic interventions, address functional mobility deficits, address ROM deficits, address strength deficits, analyze and address soft tissue restrictions, analyze and cue movement patterns, analyze and modify body mechanics/ergonomics and assess and modify postural abnormalities to attain remaining goals. to attain remaining goals.    Progress toward goals / Updated goals:    SEE PN     PLAN  [x]  Upgrade activities as tolerated YES Continue plan of care   []  Discharge due to :    []  Other:      Therapist: [place] Shows    Date: 2/25/2022 Time: 4:13 PM     Future Appointments   Date Time Provider Diana Keene   2/25/2022  2:15 PM Lyly Alvarez [facility]   2/28/2022  3:00 PM Rhina Ashley, TRAY [facility]   3/2/2022  3:30 PM Columba Bhaita [facility]   3/7/2022  3:00 PM Rhina Ashley PT [facility]   3/10/2022  3:00 PM Lyly Alvarez [facility]   3/14/2022  3:00 PM Rhina Ashley PT [facility]   3/17/2022  3:00 PM Zander Chun

## 2022-02-25 NOTE — PROGRESS NOTES
[address] [facility] PHYSICAL THERAPY  [address] [facility] [address] [facility] [address],[facility], 70 [facility] - Phone: [phone]  Fax: [phone]  PROGRESS NOTE  Patient Name: Tracy Reed : 1990   Treatment/Medical Diagnosis: Left knee pain [M25.562]   Referral Source: Elicia Angulo MD     Date of Initial Visit: 22 Attended Visits: 3 Missed Visits: 0     SUMMARY OF TREATMENT  Patient has attended 3 PT sessions for L knee pain. PT interventions have included an initial evaluation, manual therapy, therapeutic exercises, patient education, and HEP for L knee pain. CP PRN for pain control. CURRENT STATUS  Patient has made minimal progress thus far due to limited attendance due to insurance, however pt reports 50% overall improvement since beginning PT. Pain has ranged between 2-8/10 in the last 2 weeks. Noting elevated pain and \"clicking, popping\" with pivoting, stair negotiation, and kneeling. Notes functional improvement with decrease swelling and McConnel taping for lateral tracking. Improved tolerance with painful activities with use of McConnel taping. Demonstrates good HEP compliance and understanding of self management of symptoms with modalities. Goal/Measure of Progress TOWARDS SHORT TERM GOALS Goal Met? 1. 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 Status at last Eval:  Current Status: Not assessed n/a   2. Patient will achieve +2 on GROC to improve tolerance to stair negotiation. Status at last Eval: Goal established Current Status: Not assessed n/a   3. Patient will report 3/10 pain at worst to improve tolerance to ADLs. Status at last Eval: 9/10 Current Status: 8/10 Minimal progress      New Goals to be achieved in __4__  weeks:  1. 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 Status at last eval:   2.  Patient will achieve +2 on GROC to improve tolerance to stair negotiation. Status at last eval: goal established  3. Patient will report 3/10 pain at worst to improve tolerance to ADLs. Status at last eval: 8/10    RECOMMENDATIONS  Recommend continuation of skilled PT services 2x/week for 4 weeks. Thank you for this referral.  If you have any questions/comments please contact us directly at [phone]. Thank you for allowing us to assist in the care of your patient. Therapist Signature: BONY Turk, PT, DPT Date: 2/25/2022     Time: 4:04 PM   NOTE TO PHYSICIAN:  PLEASE COMPLETE THE ORDERS BELOW AND FAX TO   [facility]: [phone]  If you are unable to process this request in 24 hours please contact our office: [phone]    ___ I have read the above report and request that my patient continue as recommended.   ___ I have read the above report and request that my patient continue therapy with the following changes/special instructions:_________________________________________________________   ___ I have read the above report and request that my patient be discharged from therapy.      Physician Signature:        Date:       Time:                                 Autumn Lawton MD

## 2022-02-28 ENCOUNTER — HOSPITAL ENCOUNTER (OUTPATIENT)
Dept: PHYSICAL THERAPY | Age: 32
Discharge: HOME | End: 2022-02-28
Payer: MEDICAID

## 2022-02-28 PROCEDURE — 97530 THERAPEUTIC ACTIVITIES: CPT

## 2022-02-28 PROCEDURE — 97535 SELF CARE MNGMENT TRAINING: CPT

## 2022-02-28 PROCEDURE — 97112 NEUROMUSCULAR REEDUCATION: CPT

## 2022-02-28 PROCEDURE — 97110 THERAPEUTIC EXERCISES: CPT

## 2022-02-28 NOTE — PROGRESS NOTES
PHYSICAL THERAPY - DAILY TREATMENT NOTE      Patient Name: Natalie Szymanski        Date: 2022  : 1990   YES Patient  Verified  Visit #:   4   of   8  Insurance: Payor: Stephan Stuart / Plan: [address] [place] [address] / Product Type: Managed Care Medicaid /      In time: 2:59 Out time: 342   Total Treatment Time: 43     Medicare/BCBS Time Tracking (below)   Total Timed Codes (min):  na 1:1 Treatment Time:  n/a     TREATMENT AREA = Left knee pain [M25.562]     SUBJECTIVE  Pain Level (on 0 to 10 scale):  4  / 10   Medication Changes/New allergies or changes in medical history, any new surgeries or procedures? NO    If yes, update Summary List   Subjective Functional Status/Changes:  []  No changes reported     MD gave her a medial patellar tendon brace and said to strengthen medial side of knee (patella dislocated medially) per pt. OBJECTIVE  Modalities Rationale:     decrease pain to improve patient's ability to perform ADLs/work   min [] Estim, type/location:                                      []  att     []  unatt     []  w/US     []  w/ice    []  w/heat    min []  Mechanical Traction: type/lbs                   []  pro   []  sup   []  int   []  cont    []  before manual    []  after manual    min []  Ultrasound, settings/location:      min []  Iontophoresis w/ dexamethasone, location:                                               []  take home patch       []  in clinic   PD min [x]  Ice     []  Heat    location/position: Supine left knee    min []  Vasopneumatic Device, press/temp:        min []  Other:    [] Skin assessment post-treatment (if applicable):    []  intact    []  redness- no adverse reaction                  []redness - adverse reaction:        10 min Therapeutic Exercise: [x]  See flow sheet   Rationale:    increase strength and improve coordination to improve the patients ability to perform transfers and ADLs.     8 min Therapeutic Activity: [x]  See flow sheet   Rationale:    increase strength and improve coordination to improve the patients ability to perform transfers and ADLs. 12 min Neuromuscular Re-ed: [x]  See flow sheet   Rationale:    improve coordination to improve the patients ability to recruit quad for improved function and ambulation. 5 min Manual Therapy: Lateral quad and IT band STM   Rationale:      decrease pain, increase tissue extensibility and decrease edema  to improve patient's ability to ambulate. The manual therapy interventions were performed at a separate and distinct time from the therapeutic activities interventions. 8 min Self Care: Pt education on brace and when to use. Explanation of diagnosis and prognosis with reasoning for strengthening lateral knee (can also strengthen medial knee). Rationale:  to improve understanding of current diagnosis with realistic expectation of PT to improve compliance/adherence and satisfaction. Billed With/As:   [x] NICOLAS   [] JORDYN   [] Neuro   [] Self Care Patient Education: [x] Review HEP:   [employer] Access Code Date Issued   [facility] 02/25 with yellow miniband     [x] Progressed/Changed HEP based on:   [] Addressed barriers and behaviors     [] Therapeutic Neuroscience Pain Education, metaphor, reframing, contexts. [] Sleep Education   [] Body Mechanics [] Healing Timeframe     [] Self STM with ball at \"the spot\"  [] Walking Program/Global Activity   [] other:        Other Objective/Functional Measures:    See flow sheet for exercises performed. Presented donning medial patellar tendon brace - exercises performed with brace off   Post Treatment Pain Level (on 0 to 10) scale:   2  / 10     ASSESSMENT  Assessment/Changes in Function:     Good effort with session. Pt with good form throughout therex. Advised on gym routine and brace wearing. Continue to progress as tolerated being mindful of swelling.      []  See Progress Note/Recertification   Patient will continue to benefit from skilled PT services to modify and progress therapeutic interventions, address functional mobility deficits, address ROM deficits, address strength deficits, analyze and address soft tissue restrictions, analyze and cue movement patterns, analyze and modify body mechanics/ergonomics and assess and modify postural abnormalities to attain remaining goals. to attain remaining goals. Progress toward goals / Updated goals:    1. 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 Status at last eval: 48/100  2. Patient will achieve +2 on GROC to improve tolerance to stair negotiation. Status at last eval: goal established  3. Patient will report 3/10 pain at worst to improve tolerance to ADLs.  Status at last eval: 8/10 progressing 2/28/22          PLAN  [x]  Upgrade activities as tolerated YES Continue plan of care   []  Discharge due to :    []  Other:      Therapist: Joaquín Pennington, PT    Date: 2/28/2022 Time: 4:13 PM     Future Appointments   Date Time Provider Diana Keene   2/28/2022  3:00 PM Ovi Simon,  [facility] [address]   3/2/2022  3:30 PM Carol Ascencio [address]   3/7/2022  3:00 PM Ovi Simon,  [facility] [address]   3/10/2022  3:00 PM Carol Sonu [address]   3/14/2022  3:00 PM Ovi Simon,  [facility] [address]   3/17/2022  3:00 PM Chris Dey

## 2022-03-02 ENCOUNTER — HOSPITAL ENCOUNTER (OUTPATIENT)
Dept: PHYSICAL THERAPY | Age: 32
Discharge: HOME | End: 2022-03-02
Payer: MEDICAID

## 2022-03-02 PROCEDURE — 97112 NEUROMUSCULAR REEDUCATION: CPT

## 2022-03-02 PROCEDURE — 97535 SELF CARE MNGMENT TRAINING: CPT

## 2022-03-02 PROCEDURE — 97110 THERAPEUTIC EXERCISES: CPT

## 2022-03-02 NOTE — PROGRESS NOTES
PHYSICAL THERAPY - DAILY TREATMENT NOTE  8-    Patient Name: Butch Rios        Date: 3/2/2022  : 1990   YES Patient  Verified  Visit #:   4   of   8  Insurance: Payor: Maki Dorian / Plan: [facility] / Product Type: Managed Care Medicaid /      In time: 3:20 Out time: 4:09   Total Treatment Time: 49     Medicare/BCBS Time Tracking (below)   Total Timed Codes (min):  49 1:1 Treatment Time:  n/a     TREATMENT AREA = Left knee pain [M25.562]     SUBJECTIVE  Pain Level (on 0 to 10 scale):  4  / 10   Medication Changes/New allergies or changes in medical history, any new surgeries or procedures? NO    If yes, update Summary List   Subjective Functional Status/Changes:  []  No changes reported     Pt reports just getting off work and wore the knee brace at work. Felt okay wearing it, but still had pain. States she had L knee pain when she had her knee straight for a long time then went to bend it and turned into sharp pain into thigh       OBJECTIVE      17 min Therapeutic Exercise: [x]  See flow sheet   Rationale:    increase strength and improve coordination to improve the patients ability to perform transfers and ADLs. 10 min Neuromuscular Re-ed: [x]  See flow sheet   Rationale:    improve coordination to improve the patients ability to recruit quad for improved function and ambulation. 22 min Self Care: POC/biomechanical reassessment. Reviewed updated HEP. Reviewed current diagnosis, prognosis, and updated POC (patient goals, PT goals, and treatment). Patellarfemoral kinesiotape - education on wear time and red flags. Rationale:  to improve understanding of current diagnosis with realistic expectation of PT to improve compliance/adherence and satisfaction.       Billed With/As:   [x] TE   [] TA   [] Neuro   [] Self Care Patient Education: [x] Review HEP:   [email or web address] Access Code Date Issued   [facility]  with yellow miniband     [x] Progressed/Changed HEP based on:   [] Addressed barriers and behaviors     [] Therapeutic Neuroscience Pain Education, metaphor, reframing, contexts. [] Sleep Education   [] Body Mechanics [] Healing Timeframe     [] Self STM with ball at \"the spot\"  [] Walking Program/Global Activity   [] other:        Other Objective/Functional Measures:    *continued exercises per flowsheet      Post Treatment Pain Level (on 0 to 10) scale:   3  / 10     ASSESSMENT  Assessment/Changes in Function:     Initial performed McConnel taping, however pt continued to note popping and pain with kneeling following taping. Therefore performed kinesiotaping with patellar tendon inhibition. Pt denied pain, however still had popping. []  See Progress Note/Recertification   Patient will continue to benefit from skilled PT services to modify and progress therapeutic interventions, address functional mobility deficits, address ROM deficits, address strength deficits, analyze and address soft tissue restrictions, analyze and cue movement patterns, analyze and modify body mechanics/ergonomics and assess and modify postural abnormalities to attain remaining goals. to attain remaining goals. Progress toward goals / Updated goals:    New Goals to be achieved in __4__  weeks:  1. 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 Status at last eval: 48/100  2. Patient will achieve +2 on GROC to improve tolerance to stair negotiation. Status at last eval: goal established  3. Patient will report 3/10 pain at worst to improve tolerance to ADLs.  Status at last eval: 8/10 slowly progressing 03/02 indicated by pre vs post tx pain levels        PLAN  [x]  Upgrade activities as tolerated YES Continue plan of care   []  Discharge due to :    []  Other:      Therapist: Saumya Valdez    Date: 3/2/2022 Time: 5:37 PM     Future Appointments   Date Time Provider Diana Keene   3/2/2022  3:30 PM Gayle Chandler [facility]   3/7/2022  3:00 PM Carlene Mirza,  [facility]   3/10/2022  3:00 PM Gordon Meredith [facility]   3/14/2022  3:00 PM Carlene Mirza,  [facility]   3/17/2022  3:00 PM Kyrie Calderón

## 2022-03-07 ENCOUNTER — HOSPITAL ENCOUNTER (OUTPATIENT)
Dept: PHYSICAL THERAPY | Age: 32
Discharge: HOME | End: 2022-03-07
Payer: MEDICAID

## 2022-03-07 PROCEDURE — 97535 SELF CARE MNGMENT TRAINING: CPT

## 2022-03-07 PROCEDURE — 97112 NEUROMUSCULAR REEDUCATION: CPT

## 2022-03-07 PROCEDURE — 97110 THERAPEUTIC EXERCISES: CPT

## 2022-03-07 PROCEDURE — 97530 THERAPEUTIC ACTIVITIES: CPT

## 2022-03-07 NOTE — PROGRESS NOTES
PHYSICAL THERAPY - DAILY TREATMENT NOTE  8-    Patient Name: Emmy Watts        Date: 3/7/2022  : 1990   YES Patient  Verified  Visit #:   5   of   8  Insurance: Payor: Jet Moranon / Plan: [address] [place] / Product Type: Managed Care Medicaid /      In time: 3:00 Out time: 3:42   Total Treatment Time: 42     Medicare/BCBS Time Tracking (below)   Total Timed Codes (min):  42 1:1 Treatment Time:  n/a     TREATMENT AREA = Left knee pain [M25.562]     SUBJECTIVE  Pain Level (on 0 to 10 scale):  6   10   Medication Changes/New allergies or changes in medical history, any new surgeries or procedures? NO    If yes, update Summary List   Subjective Functional Status/Changes:  []  No changes reported     Went to [address] Wants an MRI to rule out ACL or meniscus tear    Fax [phone] Ramez Galan MD       OBJECTIVE  16 min Therapeutic Exercise: [x]  See flow sheet   Rationale:    increase strength and improve coordination to improve the patients ability to perform transfers and ADLs. 8 min Neuromuscular Re-ed: [x]  See flow sheet   Rationale:    improve coordination to improve the patients ability to recruit quad for improved function and ambulation. 8 min Therapeutic Activity: Reassessment   Rationale:      decrease pain and increase ROM to improve patient's ability to ambulate  The manual therapy interventions were performed at a separate and distinct time from the therapeutic activities interventions. 10 min Self Care: POC/biomechanical reassessment. Reviewed updated HEP. Reviewed current diagnosis, prognosis, and updated POC (patient goals, PT goals, and treatment). .    Rationale:  to improve understanding of current diagnosis with realistic expectation of PT to improve compliance/adherence and satisfaction.     Billed With/As:   [x] TE   [] TA   [] Neuro   [] Self Care Patient Education: [x] Review HEP:   [employer] Access Code Date Issued   [email or web address] 02/25 with yellow miniband     [x] Progressed/Changed HEP based on:   [] Addressed barriers and behaviors     [] Therapeutic Neuroscience Pain Education, metaphor, reframing, contexts. [] Sleep Education   [] Body Mechanics [] Healing Timeframe     [] Self STM with ball at \"the spot\"  [] Walking Program/Global Activity   [] other:        Other Objective/Functional Measures:    *continued exercises per flowsheet      Post Treatment Pain Level (on 0 to 10) scale:   4  / 10     ASSESSMENT  Assessment/Changes in Function:     See PN     []  See Progress Note/Recertification   Patient will continue to benefit from skilled PT services to modify and progress therapeutic interventions, address functional mobility deficits, address ROM deficits, address strength deficits, analyze and address soft tissue restrictions, analyze and cue movement patterns, analyze and modify body mechanics/ergonomics and assess and modify postural abnormalities to attain remaining goals. to attain remaining goals. Progress toward goals / Updated goals:    New Goals to be achieved in __4__  weeks:  1. 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 Status at last eval: 48/100  2. Patient will achieve +2 on GROC to improve tolerance to stair negotiation. Status at last eval: goal established  3. Patient will report 3/10 pain at worst to improve tolerance to ADLs.  Status at last eval: 8/10 slowly progressing 03/02 indicated by pre vs post tx pain levels        PLAN  [x]  Upgrade activities as tolerated YES Continue plan of care   []  Discharge due to :    []  Other:      Therapist: Jose Manuel Ospina PT, DPT    Date: 3/7/2022 Time: 5:37 PM     Future Appointments   Date Time Provider Diana Keene   3/7/2022  3:00 PM Beryle Servant, PT [facility]   3/10/2022  3:00 PM Jade Zamudio [facility]   3/14/2022  3:00 PM Beryle Servant, PT [facility]   3/17/2022  3:00 PM Jade Zamudio [facility]

## 2022-03-07 NOTE — PROGRESS NOTES
[address] [facility] PHYSICAL THERAPY  [address] [place] [address] [place],[facility], [facility] - Phone: [phone]  Fax: [phone]  PROGRESS NOTE  Patient Name: Elana Nageotte : 1990   Treatment/Medical Diagnosis: Left knee pain [M25.562]   Referral Source: Jayla French MD     Date of Initial Visit: 22 Attended Visits: 6 Missed Visits: 0     SUMMARY OF TREATMENT  Pt was seen for IE and 5 f/u visits with treatment consisting of manual therapy, therapeutic exercises, neuromuscular reeducation and self care techniques to improve L knee ROM, strength and stability along with instruction of HEP. CURRENT STATUS  Pt has made slow progress in PT thus far. Notes max pain levels at 8/10 and average pain level of 4/10. Pt reports 70% improvement since beginning therapy, however continue to report pain/instability with bending, prolonged walking, prolonged standing and pivoting. Ligament and mensical testing are negative upon evaluation, however MRI is likely still appropriate due to SANDIE and instances of instability. Pt experiences medial patellar tracking with knee extension and has popping/clicking that can occasionally be addressed with K-tape. Pt notes they are completing their HEP daily. Pt will continue to benefit from skilled PT to progress exercises and improve upon L knee stability. Per pt report, pt will be following up with additional specialist for her condition and continuation of care. Goal/Measure of Progress Goal Met? 1. 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 Status at last Eval: 48/100 Current Status: 49 no   2. Pt will achieve +2 on GROC to improve tolerance to stair negotiation. Status at last Eval: Goal established Current Status: +3 yes   3.   Pt will report 3/10 pain at worst to improve tolerance to ADLs   Status at last Eval: 8/10 Current Status: 8/10 no     New Goals to be achieved in __4__  weeks:  1. Continue with unmet goals above  2. Pt will achieve +4 on GROC to improve tolerance to stair negotiation. RECOMMENDATIONS  Continue with therapy 2x/week for 4 weeks to further improve upon R knee stability, strength, ROM, and functional activities. Please advise. Thank you. If you have any questions/comments please contact us directly at [phone]. Thank you for allowing us to assist in the care of your patient. Therapist Signature: Jose Manuel Ospina PT, DPT Date: 3/7/2022     Time: 9:47 AM   NOTE TO PHYSICIAN:  PLEASE COMPLETE THE ORDERS BELOW AND FAX TO   [facility] Physical Therapy: [phone]  If you are unable to process this request in 24 hours please contact our office: [phone]    ___ I have read the above report and request that my patient continue as recommended.   ___ I have read the above report and request that my patient continue therapy with the following changes/special instructions:_________________________________________________________   ___ I have read the above report and request that my patient be discharged from therapy.      Physician Signature:        Date:       Time:                                 Prudence Benavides MD

## 2022-03-07 NOTE — PROGRESS NOTES
201 [facility] PHYSICAL THERAPY  [address] [place] [address] [address],[facility], 70 [facility] - Phone: [phone]  Fax: [phone]  PROGRESS NOTE  Patient Name: Rosetta Shone : 1990   Treatment/Medical Diagnosis: Left knee pain [M25.562]   Referral Source: Bobby Patel MD     Date of Initial Visit: 22 Attended Visits: 6 Missed Visits: 0     SUMMARY OF TREATMENT  Pt was seen for IE and 5 f/u visits with treatment consisting of manual therapy, therapeutic exercises, neuromuscular reeducation and self care techniques to improve L knee ROM, strength and stability along with instruction of HEP. CURRENT STATUS  Pt has made slow progress in PT thus far. Notes max pain levels at 8/10 and average pain level of 4/10. Pt reports 70% improvement since beginning therapy, however continue to report pain/instability with bending, prolonged walking, prolonged standing and pivoting. Ligament and mensical testing are negative upon evaluation, however MRI is likely still appropriate due to SANDIE and instances of instability. Pt experiences medial patellar tracking with knee extension and has popping/clicking that can occasionally be addressed with K-tape. Pt notes they are completing their HEP daily. Pt will continue to benefit from skilled PT to progress exercises and improve upon L knee stability. Goal/Measure of Progress Goal Met? 1. 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 Status at last Eval: 48/100 Current Status: 49 no   2. Pt will achieve +2 on GROC to improve tolerance to stair negotiation. Status at last Eval: Goal established Current Status: +3 yes   3. Pt will report 3/10 pain at worst to improve tolerance to ADLs   Status at last Eval: 8/10 Current Status: 8/10 no     New Goals to be achieved in __4__  weeks:  1. Continue with unmet goals above  2.  Pt will achieve +4 on GROC to improve tolerance to stair negotiation. RECOMMENDATIONS  Continue with therapy 2x/week for 4 weeks to further improve upon R knee stability, strength, ROM, and functional activities. Please advise. Thank you. If you have any questions/comments please contact us directly at [phone]. Thank you for allowing us to assist in the care of your patient. Therapist Signature: Ivelisse Fajardo PT, DPT Date: 3/7/2022     Time: 9:47 AM   NOTE TO PHYSICIAN:  PLEASE COMPLETE THE ORDERS BELOW AND FAX TO   [facility] Physical Therapy: [phone]  If you are unable to process this request in 24 hours please contact our office: [phone]    ___ I have read the above report and request that my patient continue as recommended.   ___ I have read the above report and request that my patient continue therapy with the following changes/special instructions:_________________________________________________________   ___ I have read the above report and request that my patient be discharged from therapy.      Physician Signature:        Date:       Time:

## 2022-03-10 ENCOUNTER — HOSPITAL ENCOUNTER (OUTPATIENT)
Dept: PHYSICAL THERAPY | Age: 32
Discharge: HOME | End: 2022-03-10
Payer: MEDICAID

## 2022-03-10 PROCEDURE — 97112 NEUROMUSCULAR REEDUCATION: CPT

## 2022-03-10 PROCEDURE — 97535 SELF CARE MNGMENT TRAINING: CPT

## 2022-03-10 PROCEDURE — 97110 THERAPEUTIC EXERCISES: CPT

## 2022-03-10 PROCEDURE — 97140 MANUAL THERAPY 1/> REGIONS: CPT

## 2022-03-10 NOTE — PROGRESS NOTES
0PHYSICAL THERAPY - DAILY TREATMENT NOTE  8-14    Patient Name: Alyson Gaspar        Date: 3/10/2022  : 1990   YES Patient  Verified  Visit #:   7   of   16  Insurance: Payor: [address] / Plan: [facility] / Product Type: Managed Care Medicaid /      In time: 2:58 Out time: 3:39   Total Treatment Time: 41     Medicare/BCBS Time Tracking (below)   Total Timed Codes (min):  41 1:1 Treatment Time:  n/a     TREATMENT AREA = Left knee pain [M25.562]     SUBJECTIVE  Pain Level (on 0 to 10 scale):  6  / 10   Medication Changes/New allergies or changes in medical history, any new surgeries or procedures? NO    If yes, update Summary List   Subjective Functional Status/Changes:  []  No changes reported     Pt reports not feeling too bad. L knee MRI scheduled on 3/18. Functional limitations: sleeping - straightening the knee then   Functional improvements: work tolerance and going up the stairs       OBJECTIVE      20 min Therapeutic Exercise: [x]  See flow sheet   Rationale:    increase strength and improve coordination to improve the patients ability to perform transfers and ADLs. 11 min Neuromuscular Re-ed: [x]  See flow sheet   Rationale:    improve coordination to improve the patients ability to recruit quad for improved function and ambulation. 10 min Self Care: Patellarfemoral kinesiotape - education on wear time and red flags. Rationale:  to improve understanding of current diagnosis with realistic expectation of PT to improve compliance/adherence and satisfaction. Billed With/As:   [x] TE   [] TA   [] Neuro   [] Self Care Patient Education: [x] Review HEP:   MedBridge Access Code Date Issued   [facility]  with yellow miniband     [x] Progressed/Changed HEP based on:   [] Addressed barriers and behaviors     [] Therapeutic Neuroscience Pain Education, metaphor, reframing, contexts.     [] Sleep Education   [] Body Mechanics [] Healing Timeframe [] Self STM with ball at \"the spot\"  [] Walking Program/Global Activity   [] other:        Other Objective/Functional Measures:    *increase resistance with miniband exercises to improve LE and glute strength      Post Treatment Pain Level (on 0 to 10) scale:   5 / 10     ASSESSMENT  Assessment/Changes in Function:     Pt demo good attitude and effort with exercises today. Cues for symmetrical stance during squats at table. Noted popping in posterior knee t/o tx, however denied pain with exercise. Noted slight reduction in pain level following PT session. []  See Progress Note/Recertification   Patient will continue to benefit from skilled PT services to modify and progress therapeutic interventions, address functional mobility deficits, address ROM deficits, address strength deficits, analyze and address soft tissue restrictions, analyze and cue movement patterns, analyze and modify body mechanics/ergonomics and assess and modify postural abnormalities to attain remaining goals. to attain remaining goals. Progress toward goals / Updated goals:    New Goals to be achieved in __4__  weeks:  1. 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 Status at last eval: 49/100  2. Patient will achieve +4 on GROC to improve tolerance to stair negotiation. Status at last eval: +3  3. Patient will report 3/10 pain at worst to improve tolerance to ADLs.  Status at last eval: 8/10 slowly progressing 03/10 - indicated by pre vs post tx pain levels        PLAN  [x]  Upgrade activities as tolerated YES Continue plan of care   []  Discharge due to :    []  Other:      Therapist: Amelia Ramirez    Date: 3/10/2022 Time: 5:26 PM     Future Appointments   Date Time Provider Diana Keene   3/10/2022  3:00 PM Rosie Rasmussen [facility]   3/14/2022  3:00 PM Bartley Buerger, PT [facility]   3/17/2022  3:00 PM Ratna Otoole

## 2022-03-14 ENCOUNTER — HOSPITAL ENCOUNTER (OUTPATIENT)
Dept: PHYSICAL THERAPY | Age: 32
Discharge: HOME | End: 2022-03-14
Payer: MEDICAID

## 2022-03-14 PROCEDURE — 97110 THERAPEUTIC EXERCISES: CPT

## 2022-03-14 PROCEDURE — 97112 NEUROMUSCULAR REEDUCATION: CPT

## 2022-03-14 PROCEDURE — 97535 SELF CARE MNGMENT TRAINING: CPT

## 2022-03-14 NOTE — PROGRESS NOTES
0PHYSICAL THERAPY - DAILY TREATMENT NOTE      Patient Name: Natalie Szymanski        Date: 3/14/2022  : 1990   YES Patient  Verified  Visit #:   8   of   16  Insurance: Payor: Stephan Narciso / Plan: [address] [place] [address] / Product Type: Managed Care Medicaid /      In time: 258 Out time: 3:38   Total Treatment Time: 40     Medicare/BCBS Time Tracking (below)   Total Timed Codes (min):  40 1:1 Treatment Time:  n/a     TREATMENT AREA = Left knee pain [M25.562]     SUBJECTIVE  Pain Level (on 0 to 10 scale):  2  / 10   Medication Changes/New allergies or changes in medical history, any new surgeries or procedures? NO    If yes, update Summary List   Subjective Functional Status/Changes:  []  No changes reported     Notes knee is feeling really good today. Pain with bending down the see the kids but that's all today. OBJECTIVE  Blood Flow Restriction Procedure Note    Blood Flow Restriction Session Number:  1    If applicable, were there any adverse reactions to the last blood flow restriction therapy session? n/a      Chart reviewed for the following:  IDominga, PT, have reviewed the medical history, summary list and precautions/contraindications for Natalie Szymanski. TIME OUT performed immediately prior to start of procedure:  3:07 (enter time the timeout was completed)  Dominga GALLEGOS PT, have performed the following reviews on Natalie Szymanski prior to the start of the session:        [x] Patient was identified by name and date of birth    [x] Purpose of blood flow restriction therapy, side effects, possible complications, risks and benefits were explained to the patient   [x] Procedure site(s) verified  [x] Informed Consent was signed (initial visit) and verified verbally (subsequent visits)  [x] Patient was instructed on the need to report any new medical conditions, procedures or medications.   [x] How to respond to possible adverse effects of treatment  [x] Opportunity was given to ask any questions, all questions were answered            Location(s) of blood flow restriction cuffs:   UE:   []  Right     []  Left             LE:    []  Right    [x]  Left           Pressure applied at the cuffs: 385 mm Hg (80% of 480mm Hg)    Patients response to todays treatment:   [x]  General muscle soreness []  Numbness/tingling of extremity    []  Dizziness      []  Other: na     [x] Skin assessment post-treatment:    [x]  intact    []  redness- no adverse reaction     []  redness - adverse reaction:        8 min Therapeutic Exercise: [x]  See flow sheet   Rationale:    increase strength and improve coordination to improve the patients ability to perform transfers and ADLs. 24 min Neuromuscular Re-ed: [x]  See flow sheet   Rationale:    improve coordination to improve the patients ability to recruit quad for improved function and ambulation. 8 min Self Care: Education on BFR; technique, adverse reactions, expectations, benefits   Rationale:  to improve understanding of current diagnosis with realistic expectation of PT to improve compliance/adherence and satisfaction. Billed With/As:   [x] TE   [] TA   [] Neuro   [] Self Care Patient Education: [x] Review HEP:   [employer] Access Code Date Issued   [facility] 02/25 with yellow miniband     [x] Progressed/Changed HEP based on:   [] Addressed barriers and behaviors     [] Therapeutic Neuroscience Pain Education, metaphor, reframing, contexts. [] Sleep Education   [] Body Mechanics [] Healing Timeframe     [] Self STM with ball at \"the spot\"  [] Walking Program/Global Activity   [] other:        Other Objective/Functional Measures:    See flow sheet for exercises performed. Post Treatment Pain Level (on 0 to 10) scale:   2 / 10     ASSESSMENT  Assessment/Changes in Function:     No complaints or red flags with introduction of BFR today. Educated pt on adverse reactions and expectations.  Initially planning to do SLR, SAQ, and HS curls under BFR, however pt requested to end BFR after SLR and SAQ. Education pt to monitor LE symptoms until NV and call if any prolonged symptoms occur. []  See Progress Note/Recertification   Patient will continue to benefit from skilled PT services to modify and progress therapeutic interventions, address functional mobility deficits, address ROM deficits, address strength deficits, analyze and address soft tissue restrictions, analyze and cue movement patterns, analyze and modify body mechanics/ergonomics and assess and modify postural abnormalities to attain remaining goals. to attain remaining goals. Progress toward goals / Updated goals:    New Goals to be achieved in __4__  weeks:  1. 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 Status at last eval: 49/100  2. Patient will achieve +4 on GROC to improve tolerance to stair negotiation. Status at last eval: +3  3. Patient will report 3/10 pain at worst to improve tolerance to ADLs.  Status at last eval: 8/10 slowly progressing 03/10 - indicated by pre vs post tx pain levels        PLAN  [x]  Upgrade activities as tolerated YES Continue plan of care   []  Discharge due to :    []  Other:      Therapist: Kalie Cannon, PT, DPT    Date: 3/14/2022 Time: 5:26 PM     Future Appointments   Date Time Provider Diana Keene   3/14/2022  3:00 PM [facility]   3/17/2022  3:00 PM Araceli Arreola

## 2022-03-17 ENCOUNTER — HOSPITAL ENCOUNTER (OUTPATIENT)
Dept: PHYSICAL THERAPY | Age: 32
Discharge: HOME | End: 2022-03-17
Payer: MEDICAID

## 2022-03-17 PROCEDURE — 97112 NEUROMUSCULAR REEDUCATION: CPT

## 2022-03-17 PROCEDURE — 97110 THERAPEUTIC EXERCISES: CPT

## 2022-03-17 PROCEDURE — 97535 SELF CARE MNGMENT TRAINING: CPT

## 2022-03-17 NOTE — PROGRESS NOTES
0PHYSICAL THERAPY - DAILY TREATMENT NOTE  8-    Patient Name: Tyra Goyal        Date: 3/17/2022  : 1990   YES Patient  Verified  Visit #:   9   of   16  Insurance: Payor: [address] / [facility] [address] [facility] [place] / Product Type: Managed Care Medicaid /      In time: 3:02 Out time: 3:45   Total Treatment Time: 43     Medicare/BCBS Time Tracking (below)   Total Timed Codes (min):  43 1:1 Treatment Time:  n/a     TREATMENT AREA = Left knee pain [M25.562]     SUBJECTIVE  Pain Level (on 0 to 10 scale):  2  / 10   Medication Changes/New allergies or changes in medical history, any new surgeries or procedures? NO    If yes, update Summary List   Subjective Functional Status/Changes:  []  No changes reported     Patient reports noticing discomfort and aching in the L knee during trial of BFR LV, but felt better that evening and was happy since it was the first time she had really used her muscles in a long time. MRI 3/18 (tomorrow) and MD follow up on 3/24        OBJECTIVE  Blood Flow Restriction Procedure Note    Blood Flow Restriction Session Number:  2    If applicable, were there any adverse reactions to the last blood flow restriction therapy session? n/a      Chart reviewed for the following:  IMorelia, have reviewed the medical history, summary list and precautions/contraindications for Tyra Goyal.     TIME OUT performed immediately prior to start of procedure:  3:45 (enter time the timeout was completed)  IMorelia, have performed the following reviews on Tyra Goyal prior to the start of the session:        [x] Patient was identified by name and date of birth    [x] Purpose of blood flow restriction therapy, side effects, possible complications, risks and benefits were explained to the patient   [x] Procedure site(s) verified  [x] Informed Consent was signed (initial visit) and verified verbally (subsequent visits)  [x] Patient was instructed on the need to report any new medical conditions, procedures or medications. [x] How to respond to possible adverse effects of treatment  [x] Opportunity was given to ask any questions, all questions were answered            Location(s) of blood flow restriction cuffs:   UE:   []  Right     []  Left             LE:    []  Right    [x]  Left           Pressure applied at the cuffs: 400 mm Hg (80% of 500mm Hg)    Patients response to todays treatment:   [x]  General muscle soreness []  Numbness/tingling of extremity    []  Dizziness      []  Other: na     [x] Skin assessment post-treatment:    [x]  intact    []  redness- no adverse reaction     []  redness - adverse reaction:        20 min Therapeutic Exercise: [x]  See flow sheet   Rationale:    increase strength and improve coordination to improve the patients ability to perform transfers and ADLs. 13 min Neuromuscular Re-ed: [x]  See flow sheet   Rationale:    increase strength, improve coordination, improve balance and increase proprioception to improve the patients ability to recruit quad for improved function and ambulation. 10 min Self Care: Education on BFR; technique, adverse reactions, expectations, benefits   Rationale:  to improve understanding of current diagnosis with realistic expectation of PT to improve compliance/adherence and satisfaction. Billed With/As:   [x] TE   [] TA   [] Neuro   [] Self Care Patient Education: [x] Review HEP:   [employer] Access Code Date Issued   [facility] 02/25 with yellow miniband     [x] Progressed/Changed HEP based on:   [] Addressed barriers and behaviors     [] Therapeutic Neuroscience Pain Education, metaphor, reframing, contexts. [] Sleep Education   [] Body Mechanics [] Healing Timeframe     [] Self STM with ball at \"the spot\"  [] Walking Program/Global Activity   [] other:        Other Objective/Functional Measures:    *continued exercises per flowsheet (BFR only).  Required no assistance with SLR today. Post Treatment Pain Level (on 0 to 10) scale:  0 / 10     ASSESSMENT  Assessment/Changes in Function:     Pt noted no pain following PT session or during exercise. Educated pt on likelihood of DOMS, noting this is a normal response, and to utilize ice or medication PRN. Pt acknowledged understanding. []  See Progress Note/Recertification   Patient will continue to benefit from skilled PT services to modify and progress therapeutic interventions, address functional mobility deficits, address ROM deficits, address strength deficits, analyze and address soft tissue restrictions, analyze and cue movement patterns, analyze and modify body mechanics/ergonomics and assess and modify postural abnormalities to attain remaining goals. to attain remaining goals. Progress toward goals / Updated goals:    New Goals to be achieved in __4__  weeks:  1. 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 Status at last eval: 49/100  2. Patient will achieve +4 on GROC to improve tolerance to stair negotiation. Status at last eval: +3  3. Patient will report 3/10 pain at worst to improve tolerance to ADLs.  Status at last eval: 8/10 slowly progressing 03/17 - indicated by pre vs post tx pain levels        PLAN  [x]  Upgrade activities as tolerated YES Continue plan of care   []  Discharge due to :    []  Other:      Therapist: Mack Beach    Date: 3/17/2022 Time: 6:46 PM     Future Appointments   Date Time Provider Diana Keene   3/17/2022  3:00 PM Pollo Geronimo

## 2022-03-29 ENCOUNTER — HOSPITAL ENCOUNTER (OUTPATIENT)
Dept: PHYSICAL THERAPY | Age: 32
Discharge: HOME | End: 2022-03-29
Payer: MEDICAID

## 2022-03-29 PROCEDURE — 97530 THERAPEUTIC ACTIVITIES: CPT

## 2022-03-29 PROCEDURE — 97110 THERAPEUTIC EXERCISES: CPT

## 2022-03-29 PROCEDURE — 97112 NEUROMUSCULAR REEDUCATION: CPT

## 2022-03-29 PROCEDURE — 97535 SELF CARE MNGMENT TRAINING: CPT

## 2022-03-29 NOTE — PROGRESS NOTES
PHYSICAL THERAPY - DAILY TREATMENT NOTE      Patient Name: Nelia Curtis        Date: 3/29/2022  : 1990   YES Patient  Verified  Visit #:   10   of   16  Insurance: Payor: [address] / Plan: [address] [facility] / Product Type: Managed Care Medicaid /      In time: 2:13 Out time: 3:00   Total Treatment Time: 47     Medicare/BCBS Time Tracking (below)   Total Timed Codes (min):  na 1:1 Treatment Time:  n/a     TREATMENT AREA = Left knee pain [M25.562]     SUBJECTIVE  Pain Level (on 0 to 10 scale):  1  / 10   Medication Changes/New allergies or changes in medical history, any new surgeries or procedures? NO    If yes, update Summary List   Subjective Functional Status/Changes:  []  No changes reported     Pt reports the MRI shows a partial tear of ACL and MD wants to do surgery. Notes improvement in pain and stability since LV, however is still going to proceed with surgery. Reports she also had a very painful occurrence on Saturday where both legs \"locked up\" for 5 min and she couldn't move. Notes the next day she had busies all along her legs and increase in varicose veins. OBJECTIVE  Blood Flow Restriction Procedure Note    Blood Flow Restriction Session Number:  3    If applicable, were there any adverse reactions to the last blood flow restriction therapy session? n/a      Chart reviewed for the following:  Zoila GALLEGOS, PT, have reviewed the medical history, summary list and precautions/contraindications for Nelia Curtis.     TIME OUT performed immediately prior to start of procedure:  2:25 (enter time the timeout was completed)  Zoila GALLEGOS PT, have performed the following reviews on Nelia Curtis prior to the start of the session:        [x] Patient was identified by name and date of birth    [x] Purpose of blood flow restriction therapy, side effects, possible complications, risks and benefits were explained to the patient   [x] Procedure site(s) verified  [x] Informed Consent was signed (initial visit) and verified verbally (subsequent visits)  [x] Patient was instructed on the need to report any new medical conditions, procedures or medications. [x] How to respond to possible adverse effects of treatment  [x] Opportunity was given to ask any questions, all questions were answered            Location(s) of blood flow restriction cuffs:   UE:   []  Right     []  Left             LE:    []  Right    [x]  Left           Pressure applied at the cuffs: 300 mm Hg (80% of 380mm Hg)    Patients response to todays treatment:   [x]  General muscle soreness []  Numbness/tingling of extremity    []  Dizziness      []  Other: na     [x] Skin assessment post-treatment:    [x]  intact    []  redness- no adverse reaction     []  redness - adverse reaction:        19 min Therapeutic Exercise: [x]  See flow sheet   Rationale:    increase strength and improve coordination to improve the patients ability to perform transfers and ADLs. 10 min Therapeutic Activity: [x]  See flow sheet   Rationale:    increase strength and improve coordination to improve the patients ability to perform transfers and ADLs. 10 min Neuromuscular Re-ed: [x]  See flow sheet   Rationale:    increase strength, improve coordination, improve balance and increase proprioception to improve the patients ability to recruit quad for improved function and ambulation. 8 min Self Care: Education on BFR; technique, adverse reactions, expectations, benefits; Surgical vs non surgical options for ACL diagnosis and pros/cons of each   Rationale:  to improve understanding of current diagnosis with realistic expectation of PT to improve compliance/adherence and satisfaction.     Billed With/As:   [x] TE   [] TA   [] Neuro   [] Self Care Patient Education: [x] Review HEP:   [employer] Access Code Date Issued   [facility] 02/25 with yellow miniband     [x] Progressed/Changed HEP based on:   [] Addressed barriers and behaviors     [] Therapeutic Neuroscience Pain Education, metaphor, reframing, contexts. [] Sleep Education   [] Body Mechanics [] Healing Timeframe     [] Self STM with ball at \"the spot\"  [] Walking Program/Global Activity   [] other:        Other Objective/Functional Measures:    *continued exercises per flow sheet. Post Treatment Pain Level (on 0 to 10) scale:  0 / 10     ASSESSMENT  Assessment/Changes in Function:     Good tolerance to session. All questions answered during session and pt left feeling less stressed about ACL and without pain. Will continue to utilize BFR for strengthening and to precondition the muscle for surgery. []  See Progress Note/Recertification   Patient will continue to benefit from skilled PT services to modify and progress therapeutic interventions, address functional mobility deficits, address ROM deficits, address strength deficits, analyze and address soft tissue restrictions, analyze and cue movement patterns, analyze and modify body mechanics/ergonomics and assess and modify postural abnormalities to attain remaining goals. to attain remaining goals. Progress toward goals / Updated goals:    New Goals to be achieved in __4__  weeks:  1. 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 Status at last eval: 49/100  2. Patient will achieve +4 on GROC to improve tolerance to stair negotiation. Status at last eval: +3  3. Patient will report 3/10 pain at worst to improve tolerance to ADLs.  Status at last eval: 8/10 Progressing well 03/29 - indicated by pre vs post tx pain levels        PLAN  [x]  Upgrade activities as tolerated YES Continue plan of care   []  Discharge due to :    []  Other:      Therapist: Rosendo Bryant PT, PDT    Date: 3/29/2022 Time: 6:46 PM     Future Appointments   Date Time Provider Diana Keene   3/29/2022  2:15 PM Michael Vega, PT [facility]   3/31/2022  3:30 PM Michael Vega PT [facility] 4/6/2022  3:15 PM Chau Loredo, PT [facility]   4/8/2022  2:15 PM Jeyson Maid [facility]   4/12/2022  3:30 PM Jeyson Maid [facility]   4/14/2022  8:45 AM Jeyson Maid [facility]   4/18/2022  3:00 PM Chau Loredo, PT [facility]   4/20/2022  3:15 PM Chau Loredo, PT [facility]   4/25/2022  3:00 PM Chau Loredo, PT [facility]   4/27/2022  3:15 PM Chau Loredo, PT [facility]

## 2022-03-31 ENCOUNTER — HOSPITAL ENCOUNTER (OUTPATIENT)
Dept: PHYSICAL THERAPY | Age: 32
Discharge: HOME | End: 2022-03-31
Payer: MEDICAID

## 2022-03-31 PROCEDURE — 97530 THERAPEUTIC ACTIVITIES: CPT

## 2022-03-31 PROCEDURE — 97112 NEUROMUSCULAR REEDUCATION: CPT

## 2022-03-31 PROCEDURE — 97535 SELF CARE MNGMENT TRAINING: CPT

## 2022-03-31 PROCEDURE — 97110 THERAPEUTIC EXERCISES: CPT

## 2022-03-31 NOTE — PROGRESS NOTES
PHYSICAL THERAPY - DAILY TREATMENT NOTE      Patient Name: Nelia Curtis        Date: 3/31/2022  : 1990   YES Patient  Verified  Visit #:   11   of   16  Insurance: Payor: Nathan Alvarenga / Plan: [place] / Product Type: Managed Care Medicaid /      In time: 867 Out time: 4:10   Total Treatment Time: 40     Medicare/BCBS Time Tracking (below)   Total Timed Codes (min):  na 1:1 Treatment Time:  n/a     TREATMENT AREA = Left knee pain [M25.562]     SUBJECTIVE  Pain Level (on 0 to 10 scale):  4  / 10   Medication Changes/New allergies or changes in medical history, any new surgeries or procedures? NO    If yes, update Summary List   Subjective Functional Status/Changes:  []  No changes reported     Notes she made a wrong turn in class today and after that, every step has been painful. OBJECTIVE  Blood Flow Restriction Procedure Note    Blood Flow Restriction Session Number:  4    If applicable, were there any adverse reactions to the last blood flow restriction therapy session? n/a      Chart reviewed for the following:  IZoila PT, have reviewed the medical history, summary list and precautions/contraindications for Nelia Curtis. TIME OUT performed immediately prior to start of procedure:  2:25 (enter time the timeout was completed)  Zoila GALLEGOS PT, have performed the following reviews on Nelia Curtis prior to the start of the session:        [x] Patient was identified by name and date of birth    [x] Purpose of blood flow restriction therapy, side effects, possible complications, risks and benefits were explained to the patient   [x] Procedure site(s) verified  [x] Informed Consent was signed (initial visit) and verified verbally (subsequent visits)  [x] Patient was instructed on the need to report any new medical conditions, procedures or medications.   [x] How to respond to possible adverse effects of treatment  [x] Opportunity was given to ask any questions, all questions were answered            Location(s) of blood flow restriction cuffs:   UE:   []  Right     []  Left             LE:    []  Right    [x]  Left           Pressure applied at the cuffs: 380 mm Hg (80% of 480mm Hg)    Patients response to todays treatment:   [x]  General muscle soreness []  Numbness/tingling of extremity    []  Dizziness      []  Other: na     [x] Skin assessment post-treatment:    [x]  intact    []  redness- no adverse reaction     []  redness - adverse reaction:        10 min Therapeutic Exercise: [x]  See flow sheet   Rationale:    increase strength and improve coordination to improve the patients ability to perform transfers and ADLs. 10 min Therapeutic Activity: [x]  See flow sheet   Rationale:    increase strength and improve coordination to improve the patients ability to perform transfers and ADLs. 10 min Neuromuscular Re-ed: [x]  See flow sheet   Rationale:    increase strength, improve coordination, improve balance and increase proprioception to improve the patients ability to recruit quad for improved function and ambulation. 8 min Self Care: Education on BFR and K-tape; technique, adverse reactions, expectations, benefits   Rationale:  to improve understanding of current diagnosis with realistic expectation of PT to improve compliance/adherence and satisfaction. Billed With/As:   [x] TE   [] TA   [] Neuro   [] Self Care Patient Education: [x] Review HEP:   [employer] Access Code Date Issued   [facility] 02/25 with yellow miniband     [x] Progressed/Changed HEP based on:   [] Addressed barriers and behaviors     [] Therapeutic Neuroscience Pain Education, metaphor, reframing, contexts. [] Sleep Education   [] Body Mechanics [] Healing Timeframe     [] Self STM with ball at \"the spot\"  [] Walking Program/Global Activity   [] other:        Other Objective/Functional Measures:    *continued exercises per flow sheet.   Very prominent varicose veins bilaterally this date; no calf swelling, redness, or tenderness. Post Treatment Pain Level (on 0 to 10) scale:  0 / 10     ASSESSMENT  Assessment/Changes in Function:     Pain reduced after quad sets with BFR. Pt noted itchy skin with this, however resolved by end of session. Will continue to progress as tolerated. []  See Progress Note/Recertification   Patient will continue to benefit from skilled PT services to modify and progress therapeutic interventions, address functional mobility deficits, address ROM deficits, address strength deficits, analyze and address soft tissue restrictions, analyze and cue movement patterns, analyze and modify body mechanics/ergonomics and assess and modify postural abnormalities to attain remaining goals. to attain remaining goals. Progress toward goals / Updated goals:    New Goals to be achieved in __4__  weeks:  1. 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 Status at last eval: 49/100  2. Patient will achieve +4 on GROC to improve tolerance to stair negotiation. Status at last eval: +3  3. Patient will report 3/10 pain at worst to improve tolerance to ADLs.  Status at last eval: 8/10 Progressing well 03/29 - indicated by pre vs post tx pain levels        PLAN  [x]  Upgrade activities as tolerated YES Continue plan of care   []  Discharge due to :    []  Other:      Therapist: Chely Mccallum PT, PDT    Date: 3/31/2022 Time: 6:46 PM     Future Appointments   Date Time Provider Diana Keene   3/31/2022  3:30 PM Fredi Cai PT [facility]   4/6/2022  3:15 PM TRAY Montoya [id number]   4/8/2022  2:15 PM Rachel Lazaro [facility]   4/12/2022  3:30 PM Rachel Lazaro [facility]   4/14/2022  8:45 AM Rachel Lazaro [facility]   4/18/2022  3:00 PM Fredi [facility]ai [facility]   4/20/2022  3:15 PM Fredi Cai PT [facility]   4/25/2022  3:00 PM Fredi Cai, PT [facility]   4/27/2022  3:15 PM Fredi Cai, PT [facility]

## 2022-04-04 ENCOUNTER — APPOINTMENT (OUTPATIENT)
Dept: PHYSICAL THERAPY | Age: 32
End: 2022-04-04
Payer: MEDICAID

## 2022-04-06 ENCOUNTER — HOSPITAL ENCOUNTER (OUTPATIENT)
Dept: PHYSICAL THERAPY | Age: 32
Discharge: HOME | End: 2022-04-06
Payer: MEDICAID

## 2022-04-06 PROCEDURE — 97112 NEUROMUSCULAR REEDUCATION: CPT

## 2022-04-06 PROCEDURE — 97535 SELF CARE MNGMENT TRAINING: CPT

## 2022-04-06 PROCEDURE — 97110 THERAPEUTIC EXERCISES: CPT

## 2022-04-06 PROCEDURE — 97530 THERAPEUTIC ACTIVITIES: CPT

## 2022-04-06 NOTE — PROGRESS NOTES
201 [facility] PHYSICAL THERAPY  [address] [place] [address] Zakia Linares [facility] 25 201,[facility], 70 [facility] - Phone: [phone]  Fax: [phone]  PROGRESS NOTE  Patient Name: Jagdeep Gao : 1990   Treatment/Medical Diagnosis: Left knee pain [M25.562]   Referral Source: Arcelia Maravilla MD     Date of Initial Visit: 22 Attended Visits: 12 Missed Visits: 0     SUMMARY OF TREATMENT  Pt was seen for IE and 11 f/u visits with treatment consisting of manual therapy, therapeutic exercises, neuromuscular reeducation and self care techniques to improve L knee ROM, strength and stability along with instruction of HEP. CURRENT STATUS  Pt has made gradual progress in PT thus far. Notes max pain levels at 7/10 and average pain level of 4/10. Pt reports 70% improvement since beginning therapy. We have been utilizing BFR training to strengthen LLE, decrease pain and improve stability with good result thus far. Notes improvement with general knee stability, negotiating stairs and ambulation; Continued difficulty with bending, pivoting, squatting and kneeling. Also has spontaneous moments of swelling per pt report. Pt notes they are completing their HEP daily. Pt will continue to benefit from skilled PT to progress exercises and improve upon L knee stability and quad strength. Goal/Measure of Progress Goal Met? 1. 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 Status at last Eval: 49/100 Current Status: 64/100 no   2. Pt will achieve +4 on GROC to improve tolerance to stair negotiation. Status at last Eval: +3 Current Status: NT n/a   3. Pt will report 3/10 pain at worst to improve tolerance to ADLs   Status at last Eval: 8/10 Current Status: 7/10 at night no     New Goals to be achieved in __4__  weeks:  1. Continue with unmet goals above  2. Pt to demonstrate step down on 6in step with good control to improve knee stability.   3. Pt to demonstrate 4+/5 MMT for L hip and knee musculature for ease of ADLs. RECOMMENDATIONS  Continue with therapy 2x/week for 4 weeks to further improve upon R knee stability, strength, ROM, and functional activities. Please advise. Thank you. If you have any questions/comments please contact us directly at [phone]. Thank you for allowing us to assist in the care of your patient. Therapist Signature: Mariya Johnson PT, DPT Date: 4/6/2022     Time: 9:47 AM   NOTE TO PHYSICIAN:  PLEASE COMPLETE THE ORDERS BELOW AND FAX TO   [facility] Physical Therapy: [phone]  If you are unable to process this request in 24 hours please contact our office: [phone]    ___ I have read the above report and request that my patient continue as recommended.   ___ I have read the above report and request that my patient continue therapy with the following changes/special instructions:_________________________________________________________   ___ I have read the above report and request that my patient be discharged from therapy.      Physician Signature:        Date:       Time:

## 2022-04-06 NOTE — PROGRESS NOTES
PHYSICAL THERAPY - DAILY TREATMENT NOTE      Patient Name: Gwen Dawson        Date: 2022  : 1990   YES Patient  Verified  Visit #:   12   of   16  Insurance: Payor: [address] / [facility] [place] / Product Type: Managed Care Medicaid /      In time: 315 Out time: 4:06   Total Treatment Time: 51     Medicare/BCBS Time Tracking (below)   Total Timed Codes (min):  na 1:1 Treatment Time:  n/a     TREATMENT AREA = Left knee pain [M25.562]     SUBJECTIVE  Pain Level (on 0 to 10 scale):   10   Medication Changes/New allergies or changes in medical history, any new surgeries or procedures? NO    If yes, update Summary List   Subjective Functional Status/Changes:  []  No changes reported     Reports she will be getting the surgery in  or July. Needs to decide between patellar graft and HS graft. OBJECTIVE  Blood Flow Restriction Procedure Note    Blood Flow Restriction Session Number:  5    If applicable, were there any adverse reactions to the last blood flow restriction therapy session? n/a      Chart reviewed for the following:  IClint PT, have reviewed the medical history, summary list and precautions/contraindications for Gwen Dawson. TIME OUT performed immediately prior to start of procedure:  3:25 (enter time the timeout was completed)  Clint GALLEGOS PT, have performed the following reviews on Gwen Dawson prior to the start of the session:        [x] Patient was identified by name and date of birth    [x] Purpose of blood flow restriction therapy, side effects, possible complications, risks and benefits were explained to the patient   [x] Procedure site(s) verified  [x] Informed Consent was signed (initial visit) and verified verbally (subsequent visits)  [x] Patient was instructed on the need to report any new medical conditions, procedures or medications.   [x] How to respond to possible adverse effects of treatment  [x] Opportunity was given to ask any questions, all questions were answered            Location(s) of blood flow restriction cuffs:   UE:   []  Right     []  Left             LE:    []  Right    [x]  Left           Pressure applied at the cuffs: 320 mm Hg (80% of 410mm Hg)    Patients response to todays treatment:   [x]  General muscle soreness []  Numbness/tingling of extremity    []  Dizziness      []  Other: na     [x] Skin assessment post-treatment:    [x]  intact    []  redness- no adverse reaction     []  redness - adverse reaction:        15 min Therapeutic Exercise: [x]  See flow sheet   Rationale:    increase strength and improve coordination to improve the patients ability to perform transfers and ADLs. 8 min Therapeutic Activity: [x]  See flow sheet   Rationale:    increase strength and improve coordination to improve the patients ability to perform transfers and ADLs. 10 min Neuromuscular Re-ed: [x]  See flow sheet   Rationale:    increase strength, improve coordination, improve balance and increase proprioception to improve the patients ability to recruit quad for improved function and ambulation. 18 min Self Care: Education on BFR and K-tape; technique, adverse reactions, expectations, benefits   Rationale:  to improve understanding of current diagnosis with realistic expectation of PT to improve compliance/adherence and satisfaction. Billed With/As:   [x] TE   [] TA   [] Neuro   [] Self Care Patient Education: [x] Review HEP:   MedBridge Access Code Date Issued   [facility] 02/25 with yellow miniband     [x] Progressed/Changed HEP based on:   [] Addressed barriers and behaviors     [] Therapeutic Neuroscience Pain Education, metaphor, reframing, contexts. [] Sleep Education   [] Body Mechanics [] Healing Timeframe     [] Self STM with ball at \"the spot\"  [] Walking Program/Global Activity   [] other:        Other Objective/Functional Measures:    *continued exercises per flow sheet.   Very prominent varicose veins bilaterally this date; no calf swelling, redness, or tenderness. Post Treatment Pain Level (on 0 to 10) scale:  0 / 10     ASSESSMENT  Assessment/Changes in Function:     See PN     [x]  See Progress Note/Recertification   Patient will continue to benefit from skilled PT services to modify and progress therapeutic interventions, address functional mobility deficits, address ROM deficits, address strength deficits, analyze and address soft tissue restrictions, analyze and cue movement patterns, analyze and modify body mechanics/ergonomics and assess and modify postural abnormalities to attain remaining goals. to attain remaining goals. Progress toward goals / Updated goals:    New Goals to be achieved in __4__  weeks:  1. 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 Status at last eval: 49/100  2. Patient will achieve +4 on GROC to improve tolerance to stair negotiation. Status at last eval: +3  3. Patient will report 3/10 pain at worst to improve tolerance to ADLs.  Status at last eval: 8/10 Progressing well 03/29 - indicated by pre vs post tx pain levels        PLAN  [x]  Upgrade activities as tolerated YES Continue plan of care   []  Discharge due to :    []  Other:      Therapist: Julius Villanueva PT, PDT    Date: 4/6/2022 Time: 6:46 PM     Future Appointments   Date Time Provider Diana Keene   4/6/2022  3:15 PM TRAY [address]   4/8/2022  2:15 PM Belgica Duong [facility]   4/12/2022  3:30 PM Belgica Hum [facility]   4/14/2022  8:45 AM Belgica Duong [facility]   4/18/2022  3:00 PM Xochitl Olvera  [address] [facility]   4/20/2022  3:15 PM Xochitl Olvera  [address] [facility]   4/25/2022  3:00 PM Xochitl Olvera,  [address] [facility]   4/27/2022  3:15 PM Xochitl Olvera,  [facility]

## 2022-04-08 ENCOUNTER — HOSPITAL ENCOUNTER (OUTPATIENT)
Dept: PHYSICAL THERAPY | Age: 32
Discharge: HOME | End: 2022-04-08
Payer: MEDICAID

## 2022-04-08 PROCEDURE — 97535 SELF CARE MNGMENT TRAINING: CPT

## 2022-04-08 PROCEDURE — 97110 THERAPEUTIC EXERCISES: CPT

## 2022-04-08 PROCEDURE — 97112 NEUROMUSCULAR REEDUCATION: CPT

## 2022-04-08 NOTE — PROGRESS NOTES
PHYSICAL THERAPY - DAILY TREATMENT NOTE      Patient Name: Maxine Jenkins        Date: 2022  : 1990   YES Patient  Verified  Visit #:   15   of   16  Insurance: Payor: [address] / [facility] [place] / Product Type: Managed Care Medicaid /      In time: 2:15 Out time: 3:00   Total Treatment Time: 45     Medicare/BCBS Time Tracking (below)   Total Timed Codes (min):  na 1:1 Treatment Time:  n/a     TREATMENT AREA = Left knee pain [M25.562]     SUBJECTIVE  Pain Level (on 0 to 10 scale):  5 / 10   Medication Changes/New allergies or changes in medical history, any new surgeries or procedures? NO    If yes, update Summary List   Subjective Functional Status/Changes:  []  No changes reported     Pt reports having more pain today because a lot of work to do. Has swelling anterolateral knee - reporting this is normal because it's the end of the week. Pt reports talking to her insurance about surgery and was told to use current visits it would not affect her visits post ACL surgery. OBJECTIVE  Blood Flow Restriction Procedure Note    Blood Flow Restriction Session Number:  6    If applicable, were there any adverse reactions to the last blood flow restriction therapy session? n/a      Chart reviewed for the following:  IMeir, have reviewed the medical history, summary list and precautions/contraindications for Maxine Jenkins.     TIME OUT performed immediately prior to start of procedure:  2:40 (enter time the timeout was completed)  Meir GALLEGOS, have performed the following reviews on Maxine Jenkins prior to the start of the session:        [x] Patient was identified by name and date of birth    [x] Purpose of blood flow restriction therapy, side effects, possible complications, risks and benefits were explained to the patient   [x] Procedure site(s) verified  [x] Informed Consent was signed (initial visit) and verified verbally (subsequent visits)  [x] Patient was instructed on the need to report any new medical conditions, procedures or medications. [x] How to respond to possible adverse effects of treatment  [x] Opportunity was given to ask any questions, all questions were answered            Location(s) of blood flow restriction cuffs:   UE:   []  Right     []  Left             LE:    []  Right    [x]  Left           Pressure applied at the cuffs: 380 mm Hg (80% of 480mm Hg)    Patients response to todays treatment:   [x]  General muscle soreness []  Numbness/tingling of extremity    []  Dizziness      []  Other: na     [x] Skin assessment post-treatment:    [x]  intact    []  redness- no adverse reaction     []  redness - adverse reaction:        10 min Therapeutic Exercise: [x]  See flow sheet   Rationale:    increase strength and improve coordination to improve the patients ability to perform transfers and ADLs. 20 min Neuromuscular Re-ed: [x]  See flow sheet   Rationale:    increase strength, improve coordination, improve balance and increase proprioception to improve the patients ability to recruit quad for improved function and ambulation. 15 min Self Care: Education on BFR and K-tape; technique, adverse reactions, expectations, benefits   Rationale:  to improve understanding of current diagnosis with realistic expectation of PT to improve compliance/adherence and satisfaction. Billed With/As:   [x] TE   [] TA   [] Neuro   [] Self Care Patient Education: [x] Review HEP:   MedBridge Access Code Date Issued   [facility] 02/25 with yellow miniband     [x] Progressed/Changed HEP based on:   [] Addressed barriers and behaviors     [] Therapeutic Neuroscience Pain Education, metaphor, reframing, contexts. [] Sleep Education   [] Body Mechanics [] Healing Timeframe     [] Self STM with ball at \"the spot\"  [] Walking Program/Global Activity   [] other:        Other Objective/Functional Measures:    *continued exercises per flow sheet.  Reduced weight with LAQ due to elevated pain and visible swelling. Post Treatment Pain Level (on 0 to 10) scale:  0 / 10     ASSESSMENT  Assessment/Changes in Function:     Patient noted no pain following PT session. Able to complete exercises without significant difficulty and denied pain, however required some encouragement in order to complete full range or muscle activation. [x]  See Progress Note/Recertification   Patient will continue to benefit from skilled PT services to modify and progress therapeutic interventions, address functional mobility deficits, address ROM deficits, address strength deficits, analyze and address soft tissue restrictions, analyze and cue movement patterns, analyze and modify body mechanics/ergonomics and assess and modify postural abnormalities to attain remaining goals. to attain remaining goals. Progress toward goals / Updated goals:    New Goals to be achieved in __4__  weeks:  1. 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 Status at last eval: 49/100  2. Patient will achieve +4 on GROC to improve tolerance to stair negotiation. Status at last eval: +3  3. Patient will report 3/10 pain at worst to improve tolerance to ADLs.  Status at last eval: 8/10 Progressing well 04/08 - indicated by pre vs post tx pain levels        PLAN  [x]  Upgrade activities as tolerated YES Continue plan of care   []  Discharge due to :    []  Other:      Therapist: BONY Hernandez    Date: 4/8/2022 Time: 4:18 PM     Future Appointments   Date Time Provider Diana Keene   4/12/2022  3:30 PM Danae Mcdonald [facility]   4/14/2022  8:45 AM Shade Cordoba   4/18/2022  3:00 PM Jaron Son,  [address] [facility]   4/20/2022  3:15 PM Jaron Son,  [address] [facility]   4/25/2022  3:00 PM Jaron Son,  [address] [facility]   4/27/2022  3:15 PM Jaron Son,  [address] [facility]

## 2022-04-12 ENCOUNTER — TELEPHONE (OUTPATIENT)
Dept: PHYSICAL THERAPY | Age: 32
End: 2022-04-12

## 2022-04-12 ENCOUNTER — HOSPITAL ENCOUNTER (OUTPATIENT)
Dept: PHYSICAL THERAPY | Age: 32
End: 2022-04-12
Payer: MEDICAID

## 2022-04-14 ENCOUNTER — HOSPITAL ENCOUNTER (OUTPATIENT)
Dept: PHYSICAL THERAPY | Age: 32
Discharge: HOME | End: 2022-04-14
Payer: MEDICAID

## 2022-04-14 PROCEDURE — 97535 SELF CARE MNGMENT TRAINING: CPT

## 2022-04-14 PROCEDURE — 97112 NEUROMUSCULAR REEDUCATION: CPT

## 2022-04-14 PROCEDURE — 97110 THERAPEUTIC EXERCISES: CPT

## 2022-04-14 NOTE — PROGRESS NOTES
PHYSICAL THERAPY - DAILY TREATMENT NOTE      Patient Name: Whitney Mclaughlin        Date: 2022  : 1990   YES Patient  Verified  Visit #:   15   of   16  Insurance: Payor: [address] / [facility] [place] / Product Type: Managed Care Medicaid /      In time: 8:41 Out time: 9:28   Total Treatment Time: 47     Medicare/BCBS Time Tracking (below)   Total Timed Codes (min):  47 1:1 Treatment Time:  n/a     TREATMENT AREA = Left knee pain [M25.562]     SUBJECTIVE  Pain Level (on 0 to 10 scale):  4 / 10   Medication Changes/New allergies or changes in medical history, any new surgeries or procedures? NO    If yes, update Summary List   Subjective Functional Status/Changes:  []  No changes reported     Pt reports feeling okay this morning. States last night it was aching really bad from the swelling along the middle of the knee. Rating this pain 7/10  Pt reports her surgery date will be . OBJECTIVE  Blood Flow Restriction Procedure Note    Blood Flow Restriction Session Number:  6    If applicable, were there any adverse reactions to the last blood flow restriction therapy session? n/a      Chart reviewed for the following:  IVamshi, have reviewed the medical history, summary list and precautions/contraindications for Whitney Mclaughlin.     TIME OUT performed immediately prior to start of procedure:  9:18 AM (enter time the timeout was completed)  Vamshi GALLEGOS, have performed the following reviews on Whitney Mclaughlin prior to the start of the session:        [x] Patient was identified by name and date of birth    [x] Purpose of blood flow restriction therapy, side effects, possible complications, risks and benefits were explained to the patient   [x] Procedure site(s) verified  [x] Informed Consent was signed (initial visit) and verified verbally (subsequent visits)  [x] Patient was instructed on the need to report any new medical conditions, procedures or medications. [x] How to respond to possible adverse effects of treatment  [x] Opportunity was given to ask any questions, all questions were answered            Location(s) of blood flow restriction cuffs:   UE:   []  Right     []  Left             LE:    []  Right    [x]  Left           Pressure applied at the cuffs: 380 mm Hg (80% of 480mm Hg)    Patients response to todays treatment:   [x]  General muscle soreness []  Numbness/tingling of extremity    []  Dizziness      []  Other: na     [x] Skin assessment post-treatment:    [x]  intact    []  redness- no adverse reaction     []  redness - adverse reaction:        17 min Therapeutic Exercise: [x]  See flow sheet   Rationale:    increase strength and improve coordination to improve the patients ability to perform transfers and ADLs. 20 min Neuromuscular Re-ed: [x]  See flow sheet   Rationale:    increase strength, improve coordination, improve balance and increase proprioception to improve the patients ability to recruit quad for improved function and ambulation. 10 min Self Care: Education on BFR and K-tape; technique, adverse reactions, expectations, benefits   Rationale:  to improve understanding of current diagnosis with realistic expectation of PT to improve compliance/adherence and satisfaction. Billed With/As:   [x] TE   [] TA   [] Neuro   [] Self Care Patient Education: [x] Review HEP:   MedBridge Access Code Date Issued   [facility] 02/25 with yellow miniband     [x] Progressed/Changed HEP based on:   [] Addressed barriers and behaviors     [] Therapeutic Neuroscience Pain Education, metaphor, reframing, contexts. [] Sleep Education   [] Body Mechanics [] Healing Timeframe     [] Self STM with ball at \"the spot\"  [] Walking Program/Global Activity   [] other:        Other Objective/Functional Measures:    *pt presenting with lateral patellar swelling.  No pitting edema   *added KB squats with BFR to further improve LE strength     Post Treatment Pain Level (on 0 to 10) scale:  1 / 10     ASSESSMENT  Assessment/Changes in Function:     Pt demo fatigue throughout exercises, however was able to maintain good form without elevation in pain level. Performed timed rest breaks per BFR protocol. [x]  See Progress Note/Recertification   Patient will continue to benefit from skilled PT services to modify and progress therapeutic interventions, address functional mobility deficits, address ROM deficits, address strength deficits, analyze and address soft tissue restrictions, analyze and cue movement patterns, analyze and modify body mechanics/ergonomics and assess and modify postural abnormalities to attain remaining goals. to attain remaining goals. Progress toward goals / Updated goals:    New Goals to be achieved in __4__  weeks:  1. 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 Status at last eval: 49/100  2. Patient will achieve +4 on GROC to improve tolerance to stair negotiation. Status at last eval: +3  3. Patient will report 3/10 pain at worst to improve tolerance to ADLs.  Status at last eval: 8/10 Progressing well 04/08 - indicated by pre vs post tx pain levels       PLAN  [x]  Upgrade activities as tolerated YES Continue plan of care   []  Discharge due to :    []  Other:      Therapist: Olga Singleton    Date: 4/14/2022 Time: 10:15 AM     Future Appointments   Date Time Provider Diana Keene   4/18/2022  3:00 PM Xochitl Olvera PT [facility]   4/20/2022  3:15 PM Xochitl lOvera PT [facility]   4/25/2022  3:00 PM Xochitl Olvera PT [facility]   4/27/2022  3:15 PM Xochitl Olvera PT [facility]

## 2022-04-18 ENCOUNTER — HOSPITAL ENCOUNTER (OUTPATIENT)
Dept: PHYSICAL THERAPY | Age: 32
Discharge: HOME | End: 2022-04-18
Payer: MEDICAID

## 2022-04-18 PROCEDURE — 97112 NEUROMUSCULAR REEDUCATION: CPT

## 2022-04-18 PROCEDURE — 97530 THERAPEUTIC ACTIVITIES: CPT

## 2022-04-18 PROCEDURE — 97110 THERAPEUTIC EXERCISES: CPT

## 2022-04-18 PROCEDURE — 97535 SELF CARE MNGMENT TRAINING: CPT

## 2022-04-18 NOTE — PROGRESS NOTES
PHYSICAL THERAPY - DAILY TREATMENT NOTE      Patient Name: Rafiq Fischer        Date: 2022  : 1990   YES Patient  Verified  Visit #:   13   of   40  Insurance: Payor: [address] / Plan: [address] [facility] [place] / Product Type: Managed Care Medicaid /      In time: 2:59 Out time: 3:44   Total Treatment Time: 45     Medicare/BCBS Time Tracking (below)   Total Timed Codes (min):  na 1:1 Treatment Time:  n/a     TREATMENT AREA = Left knee pain [M25.562]     SUBJECTIVE  Pain Level (on 0 to 10 scale):  4 / 10   Medication Changes/New allergies or changes in medical history, any new surgeries or procedures? NO    If yes, update Summary List   Subjective Functional Status/Changes:  []  No changes reported     Surgery . Notes the knee is swollen but doesn't hurt today. OBJECTIVE  Blood Flow Restriction Procedure Note    Blood Flow Restriction Session Number:  7    If applicable, were there any adverse reactions to the last blood flow restriction therapy session? n/a      Chart reviewed for the following:  IJose, PT, have reviewed the medical history, summary list and precautions/contraindications for Rafiq Fischer. TIME OUT performed immediately prior to start of procedure:  3:08 PM (enter time the timeout was completed)  Jose GALLEGOS, PT, have performed the following reviews on Rafiq Fischer prior to the start of the session:        [x] Patient was identified by name and date of birth    [x] Purpose of blood flow restriction therapy, side effects, possible complications, risks and benefits were explained to the patient   [x] Procedure site(s) verified  [x] Informed Consent was signed (initial visit) and verified verbally (subsequent visits)  [x] Patient was instructed on the need to report any new medical conditions, procedures or medications.   [x] How to respond to possible adverse effects of treatment  [x] Opportunity was given to ask any questions, all questions were answered            Location(s) of blood flow restriction cuffs:   UE:   []  Right     []  Left             LE:    []  Right    [x]  Left           Pressure applied at the cuffs: 320 mm Hg (80% of 400mm Hg)    Patients response to todays treatment:   [x]  General muscle soreness []  Numbness/tingling of extremity    []  Dizziness      []  Other: na     [x] Skin assessment post-treatment:    [x]  intact    []  redness- no adverse reaction     []  redness - adverse reaction:        15 min Therapeutic Exercise: [x]  See flow sheet   Rationale:    increase strength and improve coordination to improve the patients ability to perform transfers and ADLs. 10 min Therapeutic Activity: [x]  See flow sheet   Rationale:    increase strength, improve coordination, improve balance and increase proprioception to improve the patients ability to recruit quad for improved function and ambulation. 10 min Neuromuscular Re-ed: [x]  See flow sheet   Rationale:    increase strength and improve coordination to improve the patients ability to perform ADLs    10 min Self Care: Surgery expectation, BFR benefits prior to surgery, graft options   Rationale:  to improve understanding of current diagnosis with realistic expectation of PT to improve compliance/adherence and satisfaction. Billed With/As:   [x] TE   [] TA   [] Neuro   [] Self Care Patient Education: [x] Review HEP:   Yo Access Code Date Issued   [facility] 02/25 with yellow miniband     [x] Progressed/Changed HEP based on:   [] Addressed barriers and behaviors     [] Therapeutic Neuroscience Pain Education, metaphor, reframing, contexts. [] Sleep Education   [] Body Mechanics [] Healing Timeframe     [] Self STM with ball at \"the spot\"  [] Walking Program/Global Activity   [] other:        Other Objective/Functional Measures:    See flow sheet for exercises performed.      Post Treatment Pain Level (on 0 to 10) scale:  1 / 10 ASSESSMENT  Assessment/Changes in Function:     See PN     [x]  See Progress Note/Recertification   Patient will continue to benefit from skilled PT services to modify and progress therapeutic interventions, address functional mobility deficits, address ROM deficits, address strength deficits, analyze and address soft tissue restrictions, analyze and cue movement patterns, analyze and modify body mechanics/ergonomics and assess and modify postural abnormalities to attain remaining goals. to attain remaining goals.    Progress toward goals / Updated goals:    See PN       PLAN  [x]  Upgrade activities as tolerated YES Continue plan of care   []  Discharge due to :    []  Other:      Therapist: Khadra Lorenzo PT, DPT    Date: 4/18/2022 Time: 10:15 AM     Future Appointments   Date Time Provider Diana Keene   4/18/2022  3:00 PM Cleo Jayden, PT [facility]   4/20/2022  3:15 PM Cleo Jayden, PT [facility]   4/25/2022  3:00 PM Cleo Jayden, PT [facility]   4/27/2022  3:15 PM Cleo Jayden, PT [facility]   5/2/2022 11:30 AM Lubna Manriquez [facility]   5/4/2022  4:30 PM Poppy Ramirez, PT [facility]   5/9/2022  3:00 PM Cleo Jayden, PT [facility]   5/12/2022  3:45 PM [facility]   5/16/2022  3:00 PM Cleo Jayden, PT [facility]   5/19/2022  3:45 PM [facility]   5/23/2022  3:45 PM Cleo Jayden, PT [facility]   5/26/2022  3:30 PM Cleo Jayden, PT [facility]

## 2022-04-18 NOTE — PROGRESS NOTES
[address] [facility] PHYSICAL THERAPY  [address] [facility] [address],[facility], [facility] - Phone: [phone]  Fax: [phone]  PROGRESS NOTE  Patient Name: Kenneth Tyson : 1990   Treatment/Medical Diagnosis: Left knee pain [M25.562]   Referral Source: Jeronimo Wright MD     Date of Initial Visit: 22 Attended Visits: 15 Missed Visits: 0     SUMMARY OF TREATMENT  Pt was seen for IE and 14 f/u visits with treatment consisting of manual therapy, therapeutic exercises, neuromuscular reeducation and self care techniques to improve L knee ROM, strength and stability along with instruction of HEP. CURRENT STATUS  Pt has attended 3 visits since last PN on 22. Below are comments from last PN. Pt has made gradual progress in PT thus far. Notes max pain levels at 7/10 and average pain level of 4/10. Pt reports 70% improvement since beginning therapy. We have been utilizing BFR training to strengthen LLE, decrease pain and improve stability with good result thus far. Notes improvement with general knee stability, negotiating stairs and ambulation; Continued difficulty with bending, pivoting, squatting and kneeling. Also has spontaneous moments of swelling per pt report. Pt notes they are completing their HEP daily. Pt will continue to benefit from skilled PT to progress exercises and improve upon L knee stability and quad strength in preparation for L ACL reconstruction. Goal/Measure of Progress Goal Met? 1. 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 Status at last Eval: 49/100 Current Status: 64/100 no   2. Pt will achieve +4 on GROC to improve tolerance to stair negotiation. Status at last Eval: +3 Current Status: +6 yes   3. Pt will report 3/10 pain at worst to improve tolerance to ADLs   Status at last Eval: 8/10 Current Status: 7/10 at night no     New Goals to be achieved in __8__  weeks:  1.  Continue with unmet goals above  2. Pt to demonstrate step down on 6in step with good control to improve knee stability. 3. Pt to demonstrate 4+/5 MMT for L hip and knee musculature for ease of ADLs. RECOMMENDATIONS  Continue with therapy 2x/week for 8 weeks to further improve upon R knee stability, strength, ROM, and functional activities. Please advise. Thank you. If you have any questions/comments please contact us directly at [phone]. Thank you for allowing us to assist in the care of your patient. Therapist Signature: Julián Addison PT, DPT Date: 4/18/2022     Time: 9:47 AM   NOTE TO PHYSICIAN:  PLEASE COMPLETE THE ORDERS BELOW AND FAX TO   [facility]: [phone]  If you are unable to process this request in 24 hours please contact our office: [phone]    ___ I have read the above report and request that my patient continue as recommended.   ___ I have read the above report and request that my patient continue therapy with the following changes/special instructions:_________________________________________________________   ___ I have read the above report and request that my patient be discharged from therapy.      Physician Signature:        Date:       Time:

## 2022-04-20 ENCOUNTER — APPOINTMENT (OUTPATIENT)
Dept: PHYSICAL THERAPY | Age: 32
End: 2022-04-20
Payer: MEDICAID

## 2022-04-21 ENCOUNTER — APPOINTMENT (OUTPATIENT)
Dept: PHYSICAL THERAPY | Age: 32
End: 2022-04-21
Payer: MEDICAID

## 2022-04-27 ENCOUNTER — HOSPITAL ENCOUNTER (OUTPATIENT)
Dept: PHYSICAL THERAPY | Age: 32
Discharge: HOME | End: 2022-04-27
Payer: MEDICAID

## 2022-04-27 PROCEDURE — 97110 THERAPEUTIC EXERCISES: CPT

## 2022-04-27 PROCEDURE — 97530 THERAPEUTIC ACTIVITIES: CPT

## 2022-04-27 PROCEDURE — 97112 NEUROMUSCULAR REEDUCATION: CPT

## 2022-04-27 NOTE — PROGRESS NOTES
PHYSICAL THERAPY - DAILY TREATMENT NOTE  8-    Patient Name: Wilber Lema        Date: 2022  : 1990   YES Patient  Verified  Visit #:   12   of   37  Insurance: Payor: [address] / Plan: [address] [employer] [place] / Product Type: Managed Care Medicaid /      In time: 3:02 Out time: 3:42   Total Treatment Time: 40     Medicare/BCBS Time Tracking (below)   Total Timed Codes (min):  na 1:1 Treatment Time:  n/a     TREATMENT AREA = Left knee pain [M25.562]     SUBJECTIVE  Pain Level (on 0 to 10 scale):  6 / 10   Medication Changes/New allergies or changes in medical history, any new surgeries or procedures? NO    If yes, update Summary List   Subjective Functional Status/Changes:  []  No changes reported     Reports she had a really busy day. Knee hurts a more. OBJECTIVE  Blood Flow Restriction Procedure Note    Blood Flow Restriction Session Number:  8    If applicable, were there any adverse reactions to the last blood flow restriction therapy session? n/a      Chart reviewed for the following:  Sathya GALLEGOS, PT, have reviewed the medical history, summary list and precautions/contraindications for Wilber Lema. TIME OUT performed immediately prior to start of procedure:  3:08 PM (enter time the timeout was completed)  Sathya GALLEGOS PT, have performed the following reviews on Wilber Lema prior to the start of the session:        [x] Patient was identified by name and date of birth    [x] Purpose of blood flow restriction therapy, side effects, possible complications, risks and benefits were explained to the patient   [x] Procedure site(s) verified  [x] Informed Consent was signed (initial visit) and verified verbally (subsequent visits)  [x] Patient was instructed on the need to report any new medical conditions, procedures or medications.   [x] How to respond to possible adverse effects of treatment  [x] Opportunity was given to ask any questions, all questions were answered Location(s) of blood flow restriction cuffs:   UE:   []  Right     []  Left             LE:    []  Right    [x]  Left           Pressure applied at the cuffs: 280 mm Hg (80% of 350mm Hg)    Patients response to todays treatment:   [x]  General muscle soreness []  Numbness/tingling of extremity    []  Dizziness      []  Other: na     [x] Skin assessment post-treatment:    [x]  intact    []  redness- no adverse reaction     []  redness - adverse reaction:        15 min Therapeutic Exercise: [x]  See flow sheet   Rationale:    increase strength and improve coordination to improve the patients ability to perform transfers and ADLs. 12 min Therapeutic Activity: [x]  See flow sheet   Rationale:    increase strength, improve coordination, improve balance and increase proprioception to improve the patients ability to recruit quad for improved function and ambulation. 15 min Neuromuscular Re-ed: [x]  See flow sheet   Rationale:    increase strength and improve coordination to improve the patients ability to perform ADLs    Billed With/As:   [x] TE   [] TA   [] Neuro   [] Self Care Patient Education: [x] Review HEP:   [employer] Access Code Date Issued   [facility] 02/25 with yellow miniband     [x] Progressed/Changed HEP based on:   [] Addressed barriers and behaviors     [] Therapeutic Neuroscience Pain Education, metaphor, reframing, contexts. [] Sleep Education   [] Body Mechanics [] Healing Timeframe     [] Self STM with ball at \"the spot\"  [] Walking Program/Global Activity   [] other:        Other Objective/Functional Measures:    See flow sheet for exercises performed. Post Treatment Pain Level (on 0 to 10) scale:  1 / 10     ASSESSMENT  Assessment/Changes in Function:     Pain lessened with BFR SLR. Quad and hamstring strengthening under BFR after pain under control. Will continue to progress strengthening per pt tolerance.      []  See Progress Note/Recertification   Patient will continue to benefit from skilled PT services to modify and progress therapeutic interventions, address functional mobility deficits, address ROM deficits, address strength deficits, analyze and address soft tissue restrictions, analyze and cue movement patterns, analyze and modify body mechanics/ergonomics and assess and modify postural abnormalities to attain remaining goals. to attain remaining goals.    Progress toward goals / Updated goals:    Improvement with pain levels post treatment       PLAN  [x]  Upgrade activities as tolerated YES Continue plan of care   []  Discharge due to :    []  Other:      Therapist: Alexandrea Hart PT, DPT    Date: 4/27/2022 Time: 3:55 PM     Future Appointments   Date Time Provider Diana Keene   4/27/2022  3:15 PM Kathy Gloss, PT [id number]   5/2/2022 11:30 AM Ramírez Mcconnell [facility]   5/5/2022  3:30 PM Kathy Gloss, PT [facility]   5/9/2022  3:00 PM Kathy Gloss, PT [facility]   5/12/2022  3:45 PM [facility]   5/16/2022  3:00 PM Kathy Gloss, PT [facility]   5/19/2022  3:45 PM Letitia [facility]   5/23/2022  3:45 PM Kathy Gloss, PT [facility]   5/26/2022  3:30 PM Kathy Gloss, PT [facility]

## 2022-05-02 ENCOUNTER — HOSPITAL ENCOUNTER (OUTPATIENT)
Dept: PHYSICAL THERAPY | Age: 32
Discharge: HOME | End: 2022-05-02
Payer: MEDICAID

## 2022-05-02 PROCEDURE — 97112 NEUROMUSCULAR REEDUCATION: CPT

## 2022-05-02 PROCEDURE — 97110 THERAPEUTIC EXERCISES: CPT

## 2022-05-02 PROCEDURE — 97530 THERAPEUTIC ACTIVITIES: CPT

## 2022-05-02 NOTE — PROGRESS NOTES
PHYSICAL THERAPY - DAILY TREATMENT NOTE      Patient Name: Heloise Socks        Date: 2022  : 1990   YES Patient  Verified  Visit #:   16   of   37  Insurance: Payor: [address] / [facility]: [address] [facility] [facility] [place] / Product Type: Managed Care Medicaid /      In time: 10:40 Out time: 11:24   Total Treatment Time: 44     Medicare/BCBS Time Tracking (below)   Total Timed Codes (min):  na 1:1 Treatment Time:  n/a     TREATMENT AREA = Left knee pain [M25.562]     SUBJECTIVE  Pain Level (on 0 to 10 scale):  2 / 10   Medication Changes/New allergies or changes in medical history, any new surgeries or procedures? NO    If yes, update Summary List   Subjective Functional Status/Changes:  []  No changes reported     Pt reports walking around [facility] before coming to PT today. States she was able to do the whole thing in 30 minutes. and reported no pain       OBJECTIVE  Blood Flow Restriction Procedure Note    Blood Flow Restriction Session Number:  9    If applicable, were there any adverse reactions to the last blood flow restriction therapy session? n/a      Chart reviewed for the following:  Danis GALLEGOS, have reviewed the medical history, summary list and precautions/contraindications for Heloise Socks. TIME OUT performed immediately prior to start of procedure: 11:24 AM (enter time the timeout was completed)  Danis GALLEGOS, have performed the following reviews on Heloise Socks prior to the start of the session:        [x] Patient was identified by name and date of birth    [x] Purpose of blood flow restriction therapy, side effects, possible complications, risks and benefits were explained to the patient   [x] Procedure site(s) verified  [x] Informed Consent was signed (initial visit) and verified verbally (subsequent visits)  [x] Patient was instructed on the need to report any new medical conditions, procedures or medications.   [x] How to respond to possible adverse effects of treatment  [x] Opportunity was given to ask any questions, all questions were answered            Location(s) of blood flow restriction cuffs:   UE:   []  Right     []  Left             LE:    []  Right    [x]  Left           Pressure applied at the cuffs: 288 mm Hg (80% of 360mm Hg)    Patients response to todays treatment:   [x]  General muscle soreness []  Numbness/tingling of extremity    []  Dizziness      []  Other: na     [x] Skin assessment post-treatment:    [x]  intact    []  redness- no adverse reaction     []  redness - adverse reaction:        15 min Therapeutic Exercise: [x]  See flow sheet   Rationale:    increase strength and improve coordination to improve the patients ability to perform transfers and ADLs. 14 min Therapeutic Activity: [x]  See flow sheet   Rationale:    increase strength, improve coordination, improve balance and increase proprioception to improve the patients ability to recruit quad for improved function and ambulation. 15 min Neuromuscular Re-ed: [x]  See flow sheet   Rationale:    increase strength and improve coordination to improve the patients ability to perform ADLs    Billed With/As:   [x] TE   [] TA   [] Neuro   [] Self Care Patient Education: [x] Review HEP:   [email or web address] Access Code Date Issued   [facility] 02/25 with yellow miniband     [x] Progressed/Changed HEP based on:   [] Addressed barriers and behaviors     [] Therapeutic Neuroscience Pain Education, metaphor, reframing, contexts. [] Sleep Education   [] Body Mechanics [] Healing Timeframe     [] Self STM with ball at \"the spot\"  [] Walking Program/Global Activity   [] other:        Other Objective/Functional Measures:    *continued exercises per flowsheet.  Added 6 inch step ups with L LE leading  *cues for quadriceps activation during step up     Post Treatment Pain Level (on 0 to 10) scale:  0 / 10     ASSESSMENT  Assessment/Changes in Function:     Patient noted nonpainful popping in the L knee during step ups, however denied pain. Educated pt on performing nonreciprocal step ups at [facility] to focus on quadriceps strengthening. Also suggested being near hand rail for safety. []  See Progress Note/Recertification   Patient will continue to benefit from skilled PT services to modify and progress therapeutic interventions, address functional mobility deficits, address ROM deficits, address strength deficits, analyze and address soft tissue restrictions, analyze and cue movement patterns, analyze and modify body mechanics/ergonomics and assess and modify postural abnormalities to attain remaining goals. to attain remaining goals. Progress toward goals / Updated goals:    New Goals to be achieved in __8__  weeks:  1. 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 2. Pt will report 3/10 pain at worst to improve tolerance to ADLs slowly progressing 05/02 indicated by pre vs post tx pain levels  3. Pt to demonstrate step down on 6in step with good control to improve knee stability. 4. Pt to demonstrate 4+/5 MMT for L hip and knee musculature for ease of ADLs.           PLAN  [x]  Upgrade activities as tolerated YES Continue plan of care   []  Discharge due to :    []  Other:      Therapist: BONY Greenfield    Date: 5/2/2022 Time: 3:00 PM     Future Appointments   Date Time Provider Diana Keene   5/2/2022 10:45 AM Hipolitoa July [facility]   5/5/2022  3:30 PM Hamida Hylton, PT [facility]   5/9/2022  3:00 PM Hamida Cola, PT [facility]   5/12/2022  3:45 PM Tr[facility]   5/16/2022  3:00 PM Hamida Cola, PT [facility]   5/19/2022  3:45 PM Traci [facility]   5/23/2022  3:45 PM Hamida Cola, PT [facility]   5/26/2022  3:30 PM Hamida Hylton, PT [facility]

## 2022-05-04 ENCOUNTER — APPOINTMENT (OUTPATIENT)
Dept: PHYSICAL THERAPY | Age: 32
End: 2022-05-04
Payer: MEDICAID

## 2022-05-05 ENCOUNTER — HOSPITAL ENCOUNTER (OUTPATIENT)
Dept: PHYSICAL THERAPY | Age: 32
Discharge: HOME | End: 2022-05-05
Payer: MEDICAID

## 2022-05-05 PROCEDURE — 97112 NEUROMUSCULAR REEDUCATION: CPT

## 2022-05-05 PROCEDURE — 97530 THERAPEUTIC ACTIVITIES: CPT

## 2022-05-05 PROCEDURE — 97110 THERAPEUTIC EXERCISES: CPT

## 2022-05-05 NOTE — PROGRESS NOTES
PHYSICAL THERAPY - DAILY TREATMENT NOTE      Patient Name: Rafiq Fischer        Date: 2022  : 1990   YES Patient  Verified  Visit #:   25   of   40  Insurance: Payor: [address] / [facility] / Product Type: Managed Care Medicaid /      In time: 922 Out time: 4:20   Total Treatment Time: 37     TREATMENT AREA = Left knee pain [M25.562]     SUBJECTIVE  Pain Level (on 0 to 10 scale):  2 / 10   Medication Changes/New allergies or changes in medical history, any new surgeries or procedures? NO    If yes, update Summary List   Subjective Functional Status/Changes:  []  No changes reported     Notes no new complaints. Has a little bit of pain today. Reports not doing the stairs at [facility] yet but is planning to do it this weekend. OBJECTIVE  Blood Flow Restriction Procedure Note    Blood Flow Restriction Session Number:  11    If applicable, were there any adverse reactions to the last blood flow restriction therapy session? n/a      Chart reviewed for the following:  IJose, PT, have reviewed the medical history, summary list and precautions/contraindications for Rafiq Fischer. TIME OUT performed immediately prior to start of procedure: 3:45 PM (enter time the timeout was completed)  Jose GALLEGOS, PT, have performed the following reviews on Rafiq Fischer prior to the start of the session:        [x] Patient was identified by name and date of birth    [x] Purpose of blood flow restriction therapy, side effects, possible complications, risks and benefits were explained to the patient   [x] Procedure site(s) verified  [x] Informed Consent was signed (initial visit) and verified verbally (subsequent visits)  [x] Patient was instructed on the need to report any new medical conditions, procedures or medications.   [x] How to respond to possible adverse effects of treatment  [x] Opportunity was given to ask any questions, all questions were answered Location(s) of blood flow restriction cuffs:   UE:   []  Right     []  Left             LE:    []  Right    [x]  Left           Pressure applied at the cuffs: 250 mm Hg (80% of 310mm Hg)    Patients response to todays treatment:   [x]  General muscle soreness []  Numbness/tingling of extremity    []  Dizziness      []  Other: na     [x] Skin assessment post-treatment:    [x]  intact    []  redness- no adverse reaction     []  redness - adverse reaction:        15 min Therapeutic Exercise: [x]  See flow sheet   Rationale:    increase strength and improve coordination to improve the patients ability to perform transfers and ADLs. 12 min Therapeutic Activity: [x]  See flow sheet   Rationale:    increase strength, improve coordination, improve balance and increase proprioception to improve the patients ability to recruit quad for improved function and ambulation. 10 min Neuromuscular Re-ed: [x]  See flow sheet   Rationale:    increase strength and improve coordination to improve the patients ability to perform ADLs    Billed With/As:   [x] TE   [] TA   [] Neuro   [] Self Care Patient Education: [x] Review HEP:   [employer] Access Code Date Issued   [facility] [place] with yellow miniband     [x] Progressed/Changed HEP based on:   [] Addressed barriers and behaviors     [] Therapeutic Neuroscience Pain Education, metaphor, reframing, contexts. [] Sleep Education   [] Body Mechanics [] Healing Timeframe     [] Self STM with ball at \"the spot\"  [] Walking Program/Global Activity   [] other:        Other Objective/Functional Measures:    *continued exercises per flow sheet under BFR     Post Treatment Pain Level (on 0 to 10) scale:  0 / 10     ASSESSMENT  Assessment/Changes in Function:     No complaints with session. Continued with quad exercises f/b HS exercise f/b functional exercise. Will continue to progress.       []  See Progress Note/Recertification   Patient will continue to benefit from skilled PT services to modify and progress therapeutic interventions, address functional mobility deficits, address ROM deficits, address strength deficits, analyze and address soft tissue restrictions, analyze and cue movement patterns, analyze and modify body mechanics/ergonomics and assess and modify postural abnormalities to attain remaining goals. to attain remaining goals. Progress toward goals / Updated goals:    New Goals to be achieved in __8__  weeks:  1. 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 2. Pt will report 3/10 pain at worst to improve tolerance to ADLs slowly progressing 05/02 indicated by pre vs post tx pain levels  3. Pt to demonstrate step down on 6in step with good control to improve knee stability. 4. Pt to demonstrate 4+/5 MMT for L hip and knee musculature for ease of ADLs.           PLAN  [x]  Upgrade activities as tolerated YES Continue plan of care   []  Discharge due to :    []  Other:      Therapist: Khadra Lorenzo PT, DPT    Date: 5/5/2022 Time: 4:30 PM     Future Appointments   Date Time Provider Diana Keene   5/5/2022  3:30 PM Cleo Jayden, PT [facility]   5/9/2022  3:00 PM Cleo Jayden, PT [facility]   5/12/2022  3:45 PM  [facility]   5/16/2022  3:00 PM Cleo Jayden, PT [facility]   5/19/2022  3:45 PM  [facility]   5/23/2022  3:45 PM Cleo Jayden, PT [facility]   5/26/2022  3:30 PM Cleo Jayden, PT [facility]

## 2022-05-09 ENCOUNTER — HOSPITAL ENCOUNTER (OUTPATIENT)
Dept: PHYSICAL THERAPY | Age: 32
Discharge: HOME | End: 2022-05-09
Payer: MEDICAID

## 2022-05-09 PROCEDURE — 97110 THERAPEUTIC EXERCISES: CPT

## 2022-05-09 PROCEDURE — 97112 NEUROMUSCULAR REEDUCATION: CPT

## 2022-05-09 PROCEDURE — 97530 THERAPEUTIC ACTIVITIES: CPT

## 2022-05-09 NOTE — PROGRESS NOTES
PHYSICAL THERAPY - DAILY TREATMENT NOTE      Patient Name: Jarrod Mejia        Date: 2022  : 1990   YES Patient  Verified  Visit #:      40  Insurance: Payor: Paulinealexandriajose Peer / Plan: [address] [facility] / Product Type: Managed Care Medicaid /      In time: 250 Out time: 3:30   Total Treatment Time: 40     TREATMENT AREA = Left knee pain [M25.562]     SUBJECTIVE  Pain Level (on 0 to 10 scale):  4 / 10   Medication Changes/New allergies or changes in medical history, any new surgeries or procedures? NO    If yes, update Summary List   Subjective Functional Status/Changes:  []  No changes reported     Notes this weekend the knee was hurting a lot. OBJECTIVE  Blood Flow Restriction Procedure Note    Blood Flow Restriction Session Number:  12    If applicable, were there any adverse reactions to the last blood flow restriction therapy session? n/a      Chart reviewed for the following:  Anahy GALLEGOS, PT, have reviewed the medical history, summary list and precautions/contraindications for Jarrod Mejia. TIME OUT performed immediately prior to start of procedure: 2:50 PM (enter time the timeout was completed)  Anahy GALLEGOS, PT, have performed the following reviews on Jarrod Mejia prior to the start of the session:        [x] Patient was identified by name and date of birth    [x] Purpose of blood flow restriction therapy, side effects, possible complications, risks and benefits were explained to the patient   [x] Procedure site(s) verified  [x] Informed Consent was signed (initial visit) and verified verbally (subsequent visits)  [x] Patient was instructed on the need to report any new medical conditions, procedures or medications.   [x] How to respond to possible adverse effects of treatment  [x] Opportunity was given to ask any questions, all questions were answered            Location(s) of blood flow restriction cuffs:   UE:   []  Right     []  Left             LE: []  Right    [x]  Left           Pressure applied at the cuffs: 240 mm Hg (80% of 300mm Hg)    Patients response to todays treatment:   [x]  General muscle soreness []  Numbness/tingling of extremity    []  Dizziness      []  Other: na     [x] Skin assessment post-treatment:    [x]  intact    []  redness- no adverse reaction     []  redness - adverse reaction:        15 min Therapeutic Exercise: [x]  See flow sheet   Rationale:    increase strength and improve coordination to improve the patients ability to perform transfers and ADLs. 15 min Therapeutic Activity: [x]  See flow sheet   Rationale:    increase strength, improve coordination, improve balance and increase proprioception to improve the patients ability to recruit quad for improved function and ambulation. 10 min Neuromuscular Re-ed: [x]  See flow sheet   Rationale:    increase strength and improve coordination to improve the patients ability to perform ADLs    Billed With/As:   [x] TE   [] TA   [] Neuro   [] Self Care Patient Education: [x] Review HEP:   [email or web address] Access Code Date Issued   [facility] 02/25 with yellow miniband     [x] Progressed/Changed HEP based on:   [] Addressed barriers and behaviors     [] Therapeutic Neuroscience Pain Education, metaphor, reframing, contexts. [] Sleep Education   [] Body Mechanics [] Healing Timeframe     [] Self STM with ball at \"the spot\"  [] Walking Program/Global Activity   [] other:        Other Objective/Functional Measures:    *continued exercises per flow sheet under BFR     Post Treatment Pain Level (on 0 to 10) scale:  0 / 10     ASSESSMENT  Assessment/Changes in Function:     Added bridge under BFR this date to improve glute strength. Sig improvements in quad strength since IE. BFR is also helping to reduce pain levels.      []  See Progress Note/Recertification   Patient will continue to benefit from skilled PT services to modify and progress therapeutic interventions, address functional mobility deficits, address ROM deficits, address strength deficits, analyze and address soft tissue restrictions, analyze and cue movement patterns, analyze and modify body mechanics/ergonomics and assess and modify postural abnormalities to attain remaining goals. to attain remaining goals. Progress toward goals / Updated goals:    New Goals to be achieved in __8__  weeks:  1. 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 2. Pt will report 3/10 pain at worst to improve tolerance to ADLs slowly progressing 05/02 indicated by pre vs post tx pain levels  3. Pt to demonstrate step down on 6in step with good control to improve knee stability. Progressing today with BFR at [address]  4. Pt to demonstrate 4+/5 MMT for L hip and knee musculature for ease of ADLs.           PLAN  [x]  Upgrade activities as tolerated YES Continue plan of care   []  Discharge due to :    []  Other:      Therapist: Sawyer Mayes, PT, DPT    Date: 5/9/2022 Time: 4:30 PM     Future Appointments   Date Time Provider Diana Keene   5/9/2022  3:00 PM George Ferraro,  [address] [facility]   5/12/2022  3:45 PM Franco Mejia [address] [facility]   5/16/2022  3:00 PM George Ferraro,  [address] [facility]   5/19/2022  3:45 PM Franco eMjia [address] [facility]   5/23/2022  3:45 PM George Ferraro,  [address] [facility]   5/26/2022  3:30 PM George Ferraro  [address] [facility]

## 2022-05-12 ENCOUNTER — HOSPITAL ENCOUNTER (OUTPATIENT)
Dept: PHYSICAL THERAPY | Age: 32
Discharge: HOME | End: 2022-05-12
Payer: MEDICAID

## 2022-05-12 PROCEDURE — 97530 THERAPEUTIC ACTIVITIES: CPT

## 2022-05-12 PROCEDURE — 97110 THERAPEUTIC EXERCISES: CPT

## 2022-05-12 PROCEDURE — 97112 NEUROMUSCULAR REEDUCATION: CPT

## 2022-05-12 NOTE — PROGRESS NOTES
PHYSICAL THERAPY - DAILY TREATMENT NOTE      Patient Name: Cruz Horvath        Date: 2022  : 1990   YES Patient  Verified  Visit #:   21   of   40  Insurance: Payor: Charline GONZALEZ Fitzpatrick Lydia / Plan: [employer] / Product Type: Managed Care Medicaid /      In time: 3:46 Out time: 4:30   Total Treatment Time: 44     TREATMENT AREA = Left knee pain [M25.562]     SUBJECTIVE  Pain Level (on 0 to 10 scale):  2 / 10   Medication Changes/New allergies or changes in medical history, any new surgeries or procedures? NO    If yes, update Summary List   Subjective Functional Status/Changes:  []  No changes reported      Pt reports feeling good, only having pain when kneeling on to the L knee. OBJECTIVE  Blood Flow Restriction Procedure Note    Blood Flow Restriction Session Number:  13    If applicable, were there any adverse reactions to the last blood flow restriction therapy session? n/a      Chart reviewed for the following:  Cyn GALLEGOS, have reviewed the medical history, summary list and precautions/contraindications for Cruz Horvath. TIME OUT performed immediately prior to start of procedure: 4:20 PM (enter time the timeout was completed)  Cyn GALLEGOS, have performed the following reviews on Cruz Horvath prior to the start of the session:        [x] Patient was identified by name and date of birth    [x] Purpose of blood flow restriction therapy, side effects, possible complications, risks and benefits were explained to the patient   [x] Procedure site(s) verified  [x] Informed Consent was signed (initial visit) and verified verbally (subsequent visits)  [x] Patient was instructed on the need to report any new medical conditions, procedures or medications.   [x] How to respond to possible adverse effects of treatment  [x] Opportunity was given to ask any questions, all questions were answered            Location(s) of blood flow restriction cuffs:   UE:   []  Right []  Left             LE:    []  Right    [x]  Left           Pressure applied at the cuffs: 385 mm Hg (80% of 480mm Hg)    Patients response to todays treatment:   [x]  General muscle soreness []  Numbness/tingling of extremity    []  Dizziness      []  Other: na     [x] Skin assessment post-treatment:    [x]  intact    []  redness- no adverse reaction     []  redness - adverse reaction:        15 min Therapeutic Exercise: [x]  See flow sheet   Rationale:    increase strength and improve coordination to improve the patients ability to perform transfers and ADLs. 15 min Therapeutic Activity: [x]  See flow sheet   Rationale:    increase strength, improve coordination, improve balance and increase proprioception to improve the patients ability to recruit quad for improved function and ambulation. 14 min Neuromuscular Re-ed: [x]  See flow sheet   Rationale:    increase strength and improve coordination to improve the patients ability to perform ADLs    Billed With/As:   [x] TE   [] TA   [] Neuro   [] Self Care Patient Education: [x] Review HEP:   [employer] Access Code Date Issued   [facility] 02/25 with yellow miniband     [x] Progressed/Changed HEP based on:   [] Addressed barriers and behaviors     [] Therapeutic Neuroscience Pain Education, metaphor, reframing, contexts. [] Sleep Education   [] Body Mechanics [] Healing Timeframe     [] Self STM with ball at \"the spot\"  [] Walking Program/Global Activity   [] other:        Other Objective/Functional Measures:    *continued exercises per flow sheet under BFR  *performed L single leg bridges with R ankle on L knee (figure 4 position)     Post Treatment Pain Level (on 0 to 10) scale:  0 / 10     ASSESSMENT  Assessment/Changes in Function:     Patient adrián improved ease and tolerance with single leg bridges compared to previous PT session. Noted occasional clicking  In the L knee with eccentric step downs, however denied elevation in pain level.  Pt also demonstrating improved pain levels, pt typically reporting more pain in the L knee at the end of the week. []  See Progress Note/Recertification   Patient will continue to benefit from skilled PT services to modify and progress therapeutic interventions, address functional mobility deficits, address ROM deficits, address strength deficits, analyze and address soft tissue restrictions, analyze and cue movement patterns, analyze and modify body mechanics/ergonomics and assess and modify postural abnormalities to attain remaining goals. to attain remaining goals. Progress toward goals / Updated goals:    New Goals to be achieved in __8__  weeks:  1. 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 2. Pt will report 3/10 pain at worst to improve tolerance to ADLs slowly progressing 05/12 indicated by pre vs post tx pain levels  3. Pt to demonstrate step down on 6in step with good control to improve knee stability. Progressing 05/12 today with BFR at [address]  4. Pt to demonstrate 4+/5 MMT for L hip and knee musculature for ease of ADLs.         PLAN  [x]  Upgrade activities as tolerated YES Continue plan of care   []  Discharge due to :    []  Other:      Therapist: BONY Serrano    Date: 5/12/2022 Time: 5:51 PM     Future Appointments   Date Time Provider Diana Keene   5/12/2022  3:45 PM Adis Love [facility]   5/16/2022  3:00 PM Luis Valverde PT [facility]   5/19/2022  3:45 PM Liliya Voss [facility]   5/23/2022  3:45 PM Luis Valverde PT [facility]   5/26/2022  3:30 PM Luis Valverde PT [facility]

## 2022-05-16 ENCOUNTER — HOSPITAL ENCOUNTER (OUTPATIENT)
Dept: PHYSICAL THERAPY | Age: 32
End: 2022-05-16
Payer: MEDICAID

## 2022-05-19 ENCOUNTER — APPOINTMENT (OUTPATIENT)
Dept: PHYSICAL THERAPY | Age: 32
End: 2022-05-19
Payer: MEDICAID

## 2022-05-23 ENCOUNTER — HOSPITAL ENCOUNTER (OUTPATIENT)
Dept: PHYSICAL THERAPY | Age: 32
Discharge: HOME | End: 2022-05-23
Payer: MEDICAID

## 2022-05-23 PROCEDURE — 97110 THERAPEUTIC EXERCISES: CPT

## 2022-05-23 PROCEDURE — 97112 NEUROMUSCULAR REEDUCATION: CPT

## 2022-05-23 PROCEDURE — 97535 SELF CARE MNGMENT TRAINING: CPT

## 2022-05-23 PROCEDURE — 97530 THERAPEUTIC ACTIVITIES: CPT

## 2022-05-23 NOTE — PROGRESS NOTES
PHYSICAL THERAPY - DAILY TREATMENT NOTE      Patient Name: Noris Mcmahan        Date: 2022  : 1990   YES Patient  Verified  Visit #:   21   of   40  Insurance: Payor: [address] / Plan: [facility] / Product Type: Managed Care Medicaid /      In time: 102 Out time: 4:19   Total Treatment Time: 37     TREATMENT AREA = Left knee pain [M25.562]     SUBJECTIVE  Pain Level (on 0 to 10 scale):  1 / 10   Medication Changes/New allergies or changes in medical history, any new surgeries or procedures? NO    If yes, update Summary List   Subjective Functional Status/Changes:  []  No changes reported      Notes surgery is . Wants to take a week off before having the surgery. OBJECTIVE  Blood Flow Restriction Procedure Note    Blood Flow Restriction Session Number:  14    If applicable, were there any adverse reactions to the last blood flow restriction therapy session? n/a      Chart reviewed for the following:  I, Spencer Ormond, PT, have reviewed the medical history, summary list and precautions/contraindications for Noris Mcmahan. TIME OUT performed immediately prior to start of procedure: 4:20 PM (enter time the timeout was completed)  I, Spencer Ormond, PT, have performed the following reviews on Noris Mcmahan prior to the start of the session:        [x] Patient was identified by name and date of birth    [x] Purpose of blood flow restriction therapy, side effects, possible complications, risks and benefits were explained to the patient   [x] Procedure site(s) verified  [x] Informed Consent was signed (initial visit) and verified verbally (subsequent visits)  [x] Patient was instructed on the need to report any new medical conditions, procedures or medications.   [x] How to respond to possible adverse effects of treatment  [x] Opportunity was given to ask any questions, all questions were answered            Location(s) of blood flow restriction cuffs:   UE:   []  Right []  Left             LE:    []  Right    [x]  Left           Pressure applied at the cuffs: 240 mm Hg (80% of 300mm Hg)    Patients response to todays treatment:   [x]  General muscle soreness []  Numbness/tingling of extremity    []  Dizziness      []  Other: na     [x] Skin assessment post-treatment:    [x]  intact    []  redness- no adverse reaction     []  redness - adverse reaction:        8 min Therapeutic Exercise: [x]  See flow sheet   Rationale:    increase strength and improve coordination to improve the patients ability to perform transfers and ADLs. 9 min Therapeutic Activity: [x]  See flow sheet   Rationale:    increase strength, improve coordination, improve balance and increase proprioception to improve the patients ability to recruit quad for improved function and ambulation. 8 min Neuromuscular Re-ed: [x]  See flow sheet   Rationale:    increase strength and improve coordination to improve the patients ability to perform ADLs    8 min Self Care: Pt education on expectations with surgery and post op precautions. POC the next 2 weeks and HEP review. Answered pt questions about insurance authorization. Rationale:    Pt education to improve the patients ability to progress PT. Billed With/As:   [x] TE   [] TA   [] Neuro   [] Self Care Patient Education: [x] Review HEP:   [facility] Access Code Date Issued   [facility] 02/25 with yellow miniband     [x] Progressed/Changed HEP based on:   [] Addressed barriers and behaviors     [] Therapeutic Neuroscience Pain Education, metaphor, reframing, contexts.     [] Sleep Education   [] Body Mechanics [] Healing Timeframe     [] Self STM with ball at \"the spot\"  [] Walking Program/Global Activity   [] other:        Other Objective/Functional Measures:    *continued exercises per flow sheet under BFR  *performed L single leg bridges with R ankle on L knee (figure 4 position)     Post Treatment Pain Level (on 0 to 10) scale:  0 / 10 ASSESSMENT  Assessment/Changes in Function:     See PN     [x]  See Progress Note/Recertification   Patient will continue to benefit from skilled PT services to modify and progress therapeutic interventions, address functional mobility deficits, address ROM deficits, address strength deficits, analyze and address soft tissue restrictions, analyze and cue movement patterns, analyze and modify body mechanics/ergonomics and assess and modify postural abnormalities to attain remaining goals. to attain remaining goals. Progress toward goals / Updated goals:    New Goals to be achieved in __8__  weeks:  1. 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 2. Pt will report 3/10 pain at worst to improve tolerance to ADLs slowly progressing 05/12 indicated by pre vs post tx pain levels  3. Pt to demonstrate step down on 6in step with good control to improve knee stability. Progressing 05/12 today with BFR at [address]  4. Pt to demonstrate 4+/5 MMT for L hip and knee musculature for ease of ADLs.         PLAN  [x]  Upgrade activities as tolerated YES Continue plan of care   []  Discharge due to :    []  Other:      Therapist: Veronica Rahman, PT, DPT    Date: 5/23/2022 Time: 5:51 PM     Future Appointments   Date Time Provider Diana Keene   5/23/2022  3:45 PM Hamida Hylton PT [facility]   5/26/2022  3:30 PM Hamida Hylton PT [facility]

## 2022-05-26 ENCOUNTER — HOSPITAL ENCOUNTER (OUTPATIENT)
Dept: PHYSICAL THERAPY | Age: 32
Discharge: HOME | End: 2022-05-26
Payer: MEDICAID

## 2022-05-26 PROCEDURE — 97530 THERAPEUTIC ACTIVITIES: CPT

## 2022-05-26 PROCEDURE — 97110 THERAPEUTIC EXERCISES: CPT

## 2022-05-26 PROCEDURE — 97112 NEUROMUSCULAR REEDUCATION: CPT

## 2022-05-26 NOTE — PROGRESS NOTES
PHYSICAL THERAPY - DAILY TREATMENT NOTE      Patient Name: Noris Mcmahan        Date: 2022  : 1990   YES Patient  Verified  Visit #:   25   of   40  Insurance: Payor: [address] / Plan: [address] [facility] / Product Type: Managed Care Medicaid /      In time: 3:30 Out time: 4:18   Total Treatment Time: 48     TREATMENT AREA = Left knee pain [M25.562]     SUBJECTIVE  Pain Level (on 0 to 10 scale):  5 / 10   Medication Changes/New allergies or changes in medical history, any new surgeries or procedures? NO    If yes, update Summary List   Subjective Functional Status/Changes:  []  No changes reported     Pt reports having a lot of pain for the last 2 days along the (pointing to) lateral joint line. States that she cannot keep the knee bent for long before she needs to straighten it back out. OBJECTIVE  Blood Flow Restriction Procedure Note    Blood Flow Restriction Session Number:  15    If applicable, were there any adverse reactions to the last blood flow restriction therapy session? n/a      Chart reviewed for the following:  Janeen GALLEGOS, have reviewed the medical history, summary list and precautions/contraindications for Noris Mcmahan. TIME OUT performed immediately prior to start of procedure: 4:18 PM (enter time the timeout was completed)  Janeen GALLEGOS, have performed the following reviews on Noris Mcmahan prior to the start of the session:        [x] Patient was identified by name and date of birth    [x] Purpose of blood flow restriction therapy, side effects, possible complications, risks and benefits were explained to the patient   [x] Procedure site(s) verified  [x] Informed Consent was signed (initial visit) and verified verbally (subsequent visits)  [x] Patient was instructed on the need to report any new medical conditions, procedures or medications.   [x] How to respond to possible adverse effects of treatment  [x] Opportunity was given to ask any questions, all questions were answered            Location(s) of blood flow restriction cuffs:   UE:   []  Right     []  Left             LE:    []  Right    [x]  Left           Pressure applied at the cuffs: 300 mm Hg (80% of 375mm Hg)    Patients response to todays treatment:   [x]  General muscle soreness []  Numbness/tingling of extremity    []  Dizziness      []  Other: na     [x] Skin assessment post-treatment:    [x]  intact    []  redness- no adverse reaction     []  redness - adverse reaction:        21 Min Therapeutic Exercise: [x]  See flow sheet   Rationale:    increase strength and improve coordination to improve the patients ability to perform transfers and ADLs. 10 Min Therapeutic Activity: [x]  See flow sheet   Rationale:    increase strength, improve coordination, improve balance and increase proprioception to improve the patients ability to recruit quad for improved function and ambulation. 10 min Neuromuscular Re-ed: [x]  See flow sheet   Rationale:    increase strength and improve coordination to improve the patients ability to perform ADLs    7 min Self Care: Reviewed pt's expectations with surgery and post op precautions. POC the next 2 weeks and HEP. Rationale:    Pt education to improve the patients ability to progress PT. Billed With/As:   [x] TE   [] TA   [] Neuro   [] Self Care Patient Education: [x] Review HEP:   [employer] Access Code Date Issued   [facility] 02/25 with yellow miniband     [x] Progressed/Changed HEP based on:   [] Addressed barriers and behaviors     [] Therapeutic Neuroscience Pain Education, metaphor, reframing, contexts.     [] Sleep Education   [] Body Mechanics [] Healing Timeframe     [] Self STM with ball at \"the spot\"  [] Walking Program/Global Activity   [] other:        Other Objective/Functional Measures:    *continued exercises per flowsheet     Post Treatment Pain Level (on 0 to 10) scale:  1 / 10     ASSESSMENT  Assessment/Changes in Function:     Initiated PT session with patellar taping due to elevated pain and increase c/o swelling. Able to complete exercises with minimal pain and improved tolerance. Encouraged pt to perform HEP exercises to tolerance and continuing utilizing ice to manage symptoms. Pt acknowledged understanding. [x]  See Progress Note/Recertification   Patient will continue to benefit from skilled PT services to modify and progress therapeutic interventions, address functional mobility deficits, address ROM deficits, address strength deficits, analyze and address soft tissue restrictions, analyze and cue movement patterns, analyze and modify body mechanics/ergonomics and assess and modify postural abnormalities to attain remaining goals. to attain remaining goals. Progress toward goals / Updated goals:    New Goals to be achieved in __8__  weeks:  1. 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 2. Pt will report 3/10 pain at worst to improve tolerance to ADLs slowly progressing 05/26 indicated by pre (5/10) vs post (1/10) tx pain levels  3. Pt to demonstrate step down on 6in step with good control to improve knee stability. Progressing 05/26; performed with BFR at [address]  4. Pt to demonstrate 4+/5 MMT for L hip and knee musculature for ease of ADLs.         PLAN  [x]  Upgrade activities as tolerated YES Continue plan of care   []  Discharge due to :    []  Other:      Therapist: Katie Caceres    Date: 5/26/2022 Time: 4:40 PM     Future Appointments   Date Time Provider Diana Keene   5/26/2022  3:45 PM Mani Ford [facility]   6/6/2022  3:00 PM Pita Gallardo, [place] [address] [facility]   6/9/2022  2:15 PM Mani Ford [facility]   6/13/2022  3:00 PM Pita Gallardo  [address] [facility]   6/15/2022  3:30 PM Celestino Hung [address] [facility]

## 2022-06-06 ENCOUNTER — HOSPITAL ENCOUNTER (OUTPATIENT)
Dept: PHYSICAL THERAPY | Age: 32
Discharge: HOME | End: 2022-06-06
Payer: MEDICAID

## 2022-06-06 PROCEDURE — 97530 THERAPEUTIC ACTIVITIES: CPT

## 2022-06-06 PROCEDURE — 97110 THERAPEUTIC EXERCISES: CPT

## 2022-06-06 PROCEDURE — 97112 NEUROMUSCULAR REEDUCATION: CPT

## 2022-06-06 NOTE — PROGRESS NOTES
PHYSICAL THERAPY - DAILY TREATMENT NOTE      Patient Name: Cruz Horvath        Date: 2022  : 1990   YES Patient  Verified  Visit #:   21   of   40  Insurance: Payor: Elian Jero / Plan: [address] [facility] / Product Type: Managed Care Medicaid /      In time: 3:00 Out time: 335   Total Treatment Time: 35     TREATMENT AREA = Left knee pain [M25.562]     SUBJECTIVE  Pain Level (on 0 to 10 scale):  3  10   Medication Changes/New allergies or changes in medical history, any new surgeries or procedures? NO    If yes, update Summary List   Subjective Functional Status/Changes:  []  No changes reported     Notes last week was really bad. Has off today so its doing okay. OBJECTIVE  Blood Flow Restriction Procedure Note    Blood Flow Restriction Session Number:  16    If applicable, were there any adverse reactions to the last blood flow restriction therapy session? n/a      Chart reviewed for the following:  INatasha, PT, have reviewed the medical history, summary list and precautions/contraindications for Cruz Horvath. TIME OUT performed immediately prior to start of procedure: 302 PM (enter time the timeout was completed)  INatasha, PT, have performed the following reviews on Cruz Horvath prior to the start of the session:        [x] Patient was identified by name and date of birth    [x] Purpose of blood flow restriction therapy, side effects, possible complications, risks and benefits were explained to the patient   [x] Procedure site(s) verified  [x] Informed Consent was signed (initial visit) and verified verbally (subsequent visits)  [x] Patient was instructed on the need to report any new medical conditions, procedures or medications.   [x] How to respond to possible adverse effects of treatment  [x] Opportunity was given to ask any questions, all questions were answered            Location(s) of blood flow restriction cuffs:   UE:   []  Right     []  Left LE:    []  Right    [x]  Left           Pressure applied at the cuffs: 320 mm Hg (80% of 400mm Hg)    Patients response to todays treatment:   [x]  General muscle soreness []  Numbness/tingling of extremity    []  Dizziness      []  Other: na     [x] Skin assessment post-treatment:    [x]  intact    []  redness- no adverse reaction     []  redness - adverse reaction:        15 Min Therapeutic Exercise: [x]  See flow sheet   Rationale:    increase strength and improve coordination to improve the patients ability to perform transfers and ADLs. 10 Min Therapeutic Activity: [x]  See flow sheet   Rationale:    increase strength, improve coordination, improve balance and increase proprioception to improve the patients ability to recruit quad for improved function and ambulation. 10 min Neuromuscular Re-ed: [x]  See flow sheet   Rationale:    increase strength and improve coordination to improve the patients ability to perform ADLs    Billed With/As:   [x] TE   [] TA   [] Neuro   [] Self Care Patient Education: [x] Review HEP:   [employer] Access Code Date Issued   [facility] 02/25 with yellow miniband     [x] Progressed/Changed HEP based on:   [] Addressed barriers and behaviors     [] Therapeutic Neuroscience Pain Education, metaphor, reframing, contexts. [] Sleep Education   [] Body Mechanics [] Healing Timeframe     [] Self STM with ball at \"the spot\"  [] Walking Program/Global Activity   [] other:        Other Objective/Functional Measures:    *continued exercises per flowsheet     Post Treatment Pain Level (on 0 to 10) scale:  1 / 10     ASSESSMENT  Assessment/Changes in Function:     No complaints with POC or BFR this date. Will continue to strengthen LLE prior to ACL recon.      []  See Progress Note/Recertification   Patient will continue to benefit from skilled PT services to modify and progress therapeutic interventions, address functional mobility deficits, address ROM deficits, address strength deficits, analyze and address soft tissue restrictions, analyze and cue movement patterns, analyze and modify body mechanics/ergonomics and assess and modify postural abnormalities to attain remaining goals. to attain remaining goals. Progress toward goals / Updated goals:    New Goals to be achieved in __8__  weeks:  1. 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 2. Pt will report 3/10 pain at worst to improve tolerance to ADLs slowly progressing 05/26 indicated by pre (5/10) vs post (1/10) tx pain levels  3. Pt to demonstrate step down on 6in step with good control to improve knee stability. Progressing 05/26; performed with BFR at [address]  4. Pt to demonstrate 4+/5 MMT for L hip and knee musculature for ease of ADLs.         PLAN  [x]  Upgrade activities as tolerated YES Continue plan of care   []  Discharge due to :    []  Other:      Therapist: Sawyer Mayes, PT, DPT    Date: 6/6/2022 Time: 4:40 PM     Future Appointments   Date Time Provider Diana Keene   6/6/2022  3:00 PM George Ferraro,  [address] [facility]   6/9/2022  2:15 PM Ngozi Velasco [facility]   6/13/2022  3:00 PM George Ferraro,  [address] [facility]   6/15/2022  3:30 PM Franco Mejia [address] [facility]   7/1/2022  2:00 PM George Ferraro,  [address] [facility]

## 2022-06-09 ENCOUNTER — HOSPITAL ENCOUNTER (OUTPATIENT)
Dept: PHYSICAL THERAPY | Age: 32
Discharge: HOME | End: 2022-06-09
Payer: MEDICAID

## 2022-06-09 PROCEDURE — 97110 THERAPEUTIC EXERCISES: CPT

## 2022-06-09 PROCEDURE — 97112 NEUROMUSCULAR REEDUCATION: CPT

## 2022-06-09 PROCEDURE — 97530 THERAPEUTIC ACTIVITIES: CPT

## 2022-06-09 NOTE — PROGRESS NOTES
PHYSICAL THERAPY - DAILY TREATMENT NOTE      Patient Name: Moses Xiong        Date: 2022  : 1990   YES Patient  Verified  Visit #:   25   of   40  Insurance: Payor: [address] / Plan: [address] [facility] / Product Type: Managed Care Medicaid /      In time: 2:13 Out time: 3:05   Total Treatment Time: 52     TREATMENT AREA = Left knee pain [M25.562]     SUBJECTIVE  Pain Level (on 0 to 10 scale):  2 / 10   Medication Changes/New allergies or changes in medical history, any new surgeries or procedures? NO    If yes, update Summary List   Subjective Functional Status/Changes:  []  No changes reported     Reports having pain in the L lower leg into the toes while at work, but not sure why. States she was able to massage it out and went away. OBJECTIVE  Blood Flow Restriction Procedure Note    Blood Flow Restriction Session Number:  17    If applicable, were there any adverse reactions to the last blood flow restriction therapy session? n/a      Chart reviewed for the following:  Nhna GALLEGOS [place], have reviewed the medical history, summary list and precautions/contraindications for Moses Xiong. TIME OUT performed immediately prior to start of procedure: 3:05 PM (enter time the timeout was completed)  Nhan GALLEGOS [place], have performed the following reviews on Moses Xiong prior to the start of the session:        [x] Patient was identified by name and date of birth    [x] Purpose of blood flow restriction therapy, side effects, possible complications, risks and benefits were explained to the patient   [x] Procedure site(s) verified  [x] Informed Consent was signed (initial visit) and verified verbally (subsequent visits)  [x] Patient was instructed on the need to report any new medical conditions, procedures or medications.   [x] How to respond to possible adverse effects of treatment  [x] Opportunity was given to ask any questions, all questions were answered Location(s) of blood flow restriction cuffs:   UE:   []  Right     []  Left             LE:    []  Right    [x]  Left           Pressure applied at the cuffs: 300 mm Hg (80% of 375mm Hg)    Patients response to todays treatment:   [x]  General muscle soreness []  Numbness/tingling of extremity    []  Dizziness      []  Other: na     [x] Skin assessment post-treatment:    [x]  intact    []  redness- no adverse reaction     []  redness - adverse reaction:        22 Min Therapeutic Exercise: [x]  See flow sheet   Rationale:    increase strength and improve coordination to improve the patients ability to perform transfers and ADLs. 10 Min Therapeutic Activity: [x]  See flow sheet   Rationale:    increase strength, improve coordination, improve balance and increase proprioception to improve the patients ability to recruit quad for improved function and ambulation. 20 min Neuromuscular Re-ed: [x]  See flow sheet   Rationale:    increase strength and improve coordination to improve the patients ability to perform ADLs    Billed With/As:   [x] TE   [] TA   [] Neuro   [] Self Care Patient Education: [x] Review HEP:   [employer] Access Code Date Issued   [facility] 02/25 with yellow miniband     [x] Progressed/Changed HEP based on:   [] Addressed barriers and behaviors     [] Therapeutic Neuroscience Pain Education, metaphor, reframing, contexts. [] Sleep Education   [] Body Mechanics [] Healing Timeframe     [] Self STM with ball at \"the spot\"  [] Walking Program/Global Activity   [] other:        Other Objective/Functional Measures:    *continued exercises per flowsheet  *R hamstring cramping with SL bridges, rest break with stretch to reduce strain      Post Treatment Pain Level (on 0 to 10) scale:  1 / 10     ASSESSMENT  Assessment/Changes in Function:     Continues to note fatigue with increase weight and light progression with current POC, however denies pain or red flags.  Will continue to strengthen LLE prior to ACL recon. []  See Progress Note/Recertification   Patient will continue to benefit from skilled PT services to modify and progress therapeutic interventions, address functional mobility deficits, address ROM deficits, address strength deficits, analyze and address soft tissue restrictions, analyze and cue movement patterns, analyze and modify body mechanics/ergonomics and assess and modify postural abnormalities to attain remaining goals. to attain remaining goals. Progress toward goals / Updated goals:    New Goals to be achieved in __8__  weeks:  1. 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 2. Pt will report 3/10 pain at worst to improve tolerance to ADLs slowly progressing 05/26 indicated by pre (5/10) vs post (1/10) tx pain levels  3. Pt to demonstrate step down on 6in step with good control to improve knee stability. Progressing 05/26; performed with BFR at [address]  4. Pt to demonstrate 4+/5 MMT for L hip and knee musculature for ease of ADLs.  Progressing 06/08 indicated by pre vs post tx pain levels        PLAN  [x]  Upgrade activities as tolerated YES Continue plan of care   []  Discharge due to :    []  Other:      Therapist: BONY Harris    Date: 6/9/2022 Time: 3:26 PM     Future Appointments   Date Time Provider Diana Keene   6/13/2022  3:00 PM George Ferraro PT [facility]   6/15/2022  3:30 PM Ngozi [facility]   7/1/2022  2:00 PM George Ferraro PT [facility]

## 2022-06-13 ENCOUNTER — HOSPITAL ENCOUNTER (OUTPATIENT)
Dept: PHYSICAL THERAPY | Age: 32
Discharge: HOME | End: 2022-06-13
Payer: MEDICAID

## 2022-06-13 PROCEDURE — 97530 THERAPEUTIC ACTIVITIES: CPT

## 2022-06-13 PROCEDURE — 97110 THERAPEUTIC EXERCISES: CPT

## 2022-06-13 PROCEDURE — 97112 NEUROMUSCULAR REEDUCATION: CPT

## 2022-06-13 PROCEDURE — 97535 SELF CARE MNGMENT TRAINING: CPT

## 2022-06-13 NOTE — PROGRESS NOTES
PHYSICAL THERAPY - DAILY TREATMENT NOTE      Patient Name: Nelia Curtis        Date: 2022  : 1990   YES Patient  Verified  Visit #:   22   of   40  Insurance: Payor: [address] / Plan: 231 [facility] / Product Type: Managed Care Medicaid /      In time: 2:58 Out time: 3:35   Total Treatment Time: 37     TREATMENT AREA = Left knee pain [M25.562]     SUBJECTIVE  Pain Level (on 0 to 10 scale):  2 / 10   Medication Changes/New allergies or changes in medical history, any new surgeries or procedures? NO    If yes, update Summary List   Subjective Functional Status/Changes:  []  No changes reported     Notes the knee is hanging in there. OBJECTIVE  Blood Flow Restriction Procedure Note    Blood Flow Restriction Session Number:  18    If applicable, were there any adverse reactions to the last blood flow restriction therapy session? no      Chart reviewed for the following:  Zoila GALLEGOS PT, have reviewed the medical history, summary list and precautions/contraindications for Nelia Curtis. TIME OUT performed immediately prior to start of procedure: 3:00 PM (enter time the timeout was completed)  Zoila GALLEGOS PT, have performed the following reviews on Nelia Curtis prior to the start of the session:        [x] Patient was identified by name and date of birth    [x] Purpose of blood flow restriction therapy, side effects, possible complications, risks and benefits were explained to the patient   [x] Procedure site(s) verified  [x] Informed Consent was signed (initial visit) and verified verbally (subsequent visits)  [x] Patient was instructed on the need to report any new medical conditions, procedures or medications.   [x] How to respond to possible adverse effects of treatment  [x] Opportunity was given to ask any questions, all questions were answered            Location(s) of blood flow restriction cuffs:   UE:   []  Right     []  Left             LE:    []  Right [x]  Left           Pressure applied at the cuffs: 300 mm Hg (80% of 375mm Hg)    Patients response to todays treatment:   [x]  General muscle soreness []  Numbness/tingling of extremity    []  Dizziness      []  Other: na     [x] Skin assessment post-treatment:    [x]  intact    []  redness- no adverse reaction     []  redness - adverse reaction:        10 Min Therapeutic Exercise: [x]  See flow sheet   Rationale:    increase strength and improve coordination to improve the patients ability to perform transfers and ADLs. 10 Min Therapeutic Activity: [x]  See flow sheet   Rationale:    increase strength, improve coordination, improve balance and increase proprioception to improve the patients ability to recruit quad for improved function and ambulation. 9 min Neuromuscular Re-ed: [x]  See flow sheet   Rationale:    increase strength and improve coordination to improve the patients ability to perform ADLs    8 min Self Care: Pt education in post of precautions, brace use, and return to work timeline. Rationale:    Pt education to improve the patients ability to understand ALCR to ensure safety and mitigate risk. Billed With/As:   [x] NICOLAS   [] JORDYN   [] Neuro   [] Self Care Patient Education: [x] Review HEP:   [employer] Access Code Date Issued   [facility] 02/25 with yellow miniband     [x] Progressed/Changed HEP based on:   [] Addressed barriers and behaviors     [] Therapeutic Neuroscience Pain Education, metaphor, reframing, contexts. [] Sleep Education   [] Body Mechanics [] Healing Timeframe     [] Self STM with ball at \"the spot\"  [] Walking Program/Global Activity   [] other:        Other Objective/Functional Measures:    *continued exercises per flowsheet  *Added hip ABD this date for lateral column strengthening     Post Treatment Pain Level (on 0 to 10) scale:  1 / 10     ASSESSMENT  Assessment/Changes in Function:     No complaints with POC or session. Will DC NV due to ACLR.      []  See Progress Note/Recertification   Patient will continue to benefit from skilled PT services to modify and progress therapeutic interventions, address functional mobility deficits, address ROM deficits, address strength deficits, analyze and address soft tissue restrictions, analyze and cue movement patterns, analyze and modify body mechanics/ergonomics and assess and modify postural abnormalities to attain remaining goals. to attain remaining goals. Progress toward goals / Updated goals:    New Goals to be achieved in __8__  weeks:  1. 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 2. Pt will report 3/10 pain at worst to improve tolerance to ADLs slowly progressing 05/26 indicated by pre (5/10) vs post (1/10) tx pain levels  3. Pt to demonstrate step down on 6in step with good control to improve knee stability. Progressing 05/26; performed with BFR at [address]  4. Pt to demonstrate 4+/5 MMT for L hip and knee musculature for ease of ADLs.  Progressing 06/08 indicated by pre vs post tx pain levels        PLAN  [x]  Upgrade activities as tolerated YES Continue plan of care   []  Discharge due to :    []  Other:      Therapist: Edwar Mujica, PT, DPT    Date: 6/13/2022 Time: 3:26 PM     Future Appointments   Date Time Provider Diana Keene   6/13/2022  3:00 PM Louisa Alexander, PT [facility]   6/15/2022  3:30 PM [facility]   7/1/2022  2:00 PM Louisa Alexander PT [facility]   7/5/2022 11:15 AM Louisa Alexander PT [facility]   7/7/2022  9:45 AM Louisa Munoz PT [facility]   7/8/2022  8:45 AM Aditi Chandler [facility]   7/11/2022 11:30 AM Waynard Harvard [facility]   7/13/2022  9:15 AM Waynard Harvard [facility]   7/15/2022  8:00 AM Waynard Harvard [facility]   7/18/2022  2:00 PM Waynard Harvard [facility]   7/20/2022  8:00 AM [facility] Alexander, PT [facility]   7/22/2022  8:00 AM Waynard Harvard [facility]   7/25/2022  9:00 AM [facility] Alexander, PT [facility]   7/27/2022  1:15 PM David [facility]   7/29/2022  9:00 AM Kathy Gloss,  [facility]   8/1/2022  9:00 AM Kathy Gloss,  [facility]   8/3/2022  8:00 AM Kathy Gloss,  [facility]   8/5/2022  8:00 AM Letitia Spar [facility]   8/8/2022  8:30 AM Vanesa WOODWARD [facility]   8/10/2022  8:00 AM Kathy Gloss,  [facility]   8/12/2022  8:00 AM Letitia Spar [facility]   8/15/2022  8:30 AM Vanesa WOODWARD [facility]   8/17/2022  8:00 AM Kathy Gloss,  [facility]   8/19/2022  8:00 AM Letitia Spar [facility]   8/22/2022  8:30 AM Letitia Spar [facility]   8/24/2022  8:00 AM Kathy Gloss,  [facility]   8/26/2022  8:00 AM Letitia Spar [facility]   8/29/2022  8:30 AM Letitia Spar [address] [facility]   8/31/2022  8:00 AM Kathy Gloss,  [facility]   9/2/2022  8:00 AM Letitia Spar [address] [facility]

## 2022-06-15 ENCOUNTER — APPOINTMENT (OUTPATIENT)
Dept: PHYSICAL THERAPY | Age: 32
End: 2022-06-15
Payer: MEDICAID

## 2022-06-15 NOTE — PROGRESS NOTES
[address] [facility] PHYSICAL THERAPY  [address] [place] Linares [facility] [address],Daniela [place], [address] [facility] [address] - Phone: [phone]  Fax: [phone]  DISCHARGE NOTE  Patient Name: Gloria Plummer : 1990   Treatment/Medical Diagnosis: Left knee pain [M25.562]   Referral Source: Paulie Polk MD     Date of Initial Visit: 22 Attended Visits: 25 Missed Visits: 3     SUMMARY OF TREATMENT  Pt was seen for IE and 24 f/u visits with treatment consisting of manual therapy, therapeutic exercises, neuromuscular reeducation and self care techniques to improve L knee ROM, strength and stability along with instruction of HEP. CURRENT STATUS  Pt was seen 4 times after last PN with focus on strengthening L knee prior to ACLR. She was scheduled to return today for her last visit, however had to cancel due to family reasons. She will be DC at this time and return after surgery. Below are comments and goals from last PN. Pt has made good progress in PT thus far. Notes max pain levels at 6/10 and average pain level of 3-4/10. Since beginning BFR training, pt reports good improvement in stability and strength. Reports she has started walking around [facility] again with less instances of instability and pain, however has not done the stairs due to fear of re-injury. Notes more pain at the end of the week after kneeling, squatting, and being on her feet due to her job. She also reports crossing her knees is painful, along with sitting in a low chair from prolonged time. Pt notes they are completing their HEP daily. Goal/Measure of Progress Goal Met? 1. 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 Status at last Eval: 64/100 Current Status: Will take at DC in 2 weeks n/a   2. Pt to demonstrate step down on 6in step with good control to improve knee stability. Status at last Eval: Goal established Current Status: MET yes   3.   Pt will report 3/10 pain at worst to improve tolerance to ADLs   Status at last Eval: 7/10 at night Current Status: 6/10 no     RECOMMENDATIONS  DC at this time due to pt getting surgery. If you have any questions/comments please contact us directly at [phone]. Thank you for allowing us to assist in the care of your patient. Therapist Signature: Mariya Johnson PT, DPT Date: 6/15/2022     Time: 9:47 AM   NOTE TO PHYSICIAN:  PLEASE COMPLETE THE ORDERS BELOW AND FAX TO   [facility] Physical [facility]: [phone]  If you are unable to process this request in 24 hours please contact our office: [phone]    ___ I have read the above report and request that my patient continue as recommended.   ___ I have read the above report and request that my patient continue therapy with the following changes/special instructions:_________________________________________________________   ___ I have read the above report and request that my patient be discharged from therapy.      Physician Signature:        Date:       Time:

## 2022-07-01 ENCOUNTER — HOSPITAL ENCOUNTER (OUTPATIENT)
Dept: PHYSICAL THERAPY | Age: 32
Discharge: HOME | End: 2022-07-01
Payer: MEDICAID

## 2022-07-01 PROCEDURE — 97162 PT EVAL MOD COMPLEX 30 MIN: CPT

## 2022-07-01 NOTE — PROGRESS NOTES
[address], [facility] [address],[facility], 70 [facility] - Phone: [phone]  Fax: [phone] OF [facility] / [address] [facility]  Patient Name: Chino Do : 1990   Medical   Diagnosis: Left knee pain [M25.562] Treatment Diagnosis: L knee pain   Onset Date: 22     Referral Source: [facility]): 2022   Prior Hospitalization: See medical history Provider #: [id number]   Prior Level of Function: No pain with stairs, ambulation, or basketball   Comorbidities: Asthma, depression, BMI>30   Medications: Verified on Patient Summary List   The Plan of Care and following information is based on the information from the initial evaluation.   ===========================================================================================  Assessment / key information:  Chino Do is a 27 y.o. female with Dx: L knee pain, who reports undergoing L ACLR with hamstring graft on 22. Was previously seen in PT for prehab prior to surgery and we are well aware of her case. Notes everything in surgery went well. Reports she finished the oxycodone yesterday and was in a lot of pain last night and today. Trouble sleeping without pain meds but needs to drive to get to PT and to take child to . Been sleeping with the brace on and walking with crutches. Pt rates pain as 10/10 max, 2/10 at best, 6/10 currently. Objective: FOTO score = 1 (an established functional score where 100 = no disability). Gait: bilat crutches with step to pattern and brace locked in extension, forward trunk. ; gait training performed with appropriate crutch height, step to pattern, and pushing through hands instead of leaning on axilla; instructed in stair negotiation as well. Palpation swelling around L knee as expected post ALCR. Knee ROM Flexion R 128 L 70, Extension R 0 L lacking 2. Strength: R hip globally 4/5, L NT; R knee globally 5/5, L NT  Girth measurement at superior patella: R: 45cm, L 51.8cm  Vasopnuematic compression justification:  Per bilateral girth measures taken and listed above the edema is considered significant and having an impact on the patient's strength, balance, gait, transfers, self care and ADLs  Current deficits include decreased ROM, strength and stability through L knee secondary to L ACLR with hamstring autograft. Pt will benefit from a comprehensive POC/HEP to address impairments and restore function in order to return to prior level of function and prevent secondary impairments.  ===========================================================================================  Eval Complexity: History HIGH Complexity :3+ comorbidities / personal factors will impact the outcome/ POC ;  Examination  HIGH Complexity : 4+ Standardized tests and measures addressing body structure, function, activity limitation and / or participation in recreation ; Presentation MEDIUM Complexity : Evolving with changing characteristics ;   Decision Making HIGH Complexity : FOTO score of 1- 25 ; Overall Complexity MEDIUM  Problem List: pain affecting function, decrease ROM, decrease strength, impaired gait/ balance, decrease ADL/ functional abilitiies, decrease activity tolerance, decrease flexibility/ joint mobility, and decrease transfer abilities   Treatment Plan may include any combination of the following: Therapeutic exercise, Therapeutic activities, Neuromuscular re-education, Physical agent/modality, Gait/balance training, Manual therapy, Aquatic therapy, Patient education, Self Care training, Functional mobility training, Home safety training, and Stair training  Patient / Family readiness to learn indicated by: asking questions, trying to perform skills, and interest  Persons(s) to be included in education: patient (P)  Barriers to Learning/Limitations: None  Measures taken, if barriers to learning:    Patient Goal (s): Walking, jogging, running, kneeling, squatting   Patient self reported health status: fair  Rehabilitation Potential: good   Short Term Goals: To be accomplished in  6  weeks:  1. Independent with HEP to progress to meet goals. 2. Pt to report decreased max pain levels less than 6/10 for improvement in ADLs. 3. Pt to demonstrate appropriate gait without crutches and good quad control to improve community ambulation.  Long Term Goals: To be accomplished in  12  weeks:  1. Improve FOTO score to 45/100 to show a significant functional change. 2. Pt to report decreased max pain levels less than 3/10 for improvement in QoL. 3. Pt to demonstrate AROM of L knee to 0-125 for ease of functional activities. Frequency / Duration:   Patient to be seen  2-3  times per week for 12  weeks:  Patient / Caregiver education and instruction: self care, activity modification, brace/ splint application and exercises  Therapist Signature: Ernie Christine PT DPT Date: 9/4/0207   Certification Period: na Time: 12:37 PM   ===========================================================================================  I certify that the above Physical Therapy Services are being furnished while the patient is under my care. I agree with the treatment plan and certify that this therapy is necessary. Physician Signature:        Date:       Time:                                        Brittany Allen,*  Please sign and return to [facility] at [facility], [facility]. or you may fax the signed copy to [phone]. Thank you.

## 2022-07-01 NOTE — PROGRESS NOTES
PHYSICAL THERAPY - DAILY TREATMENT NOTE    Patient Name: Vic Owens        Date: 2022  : 1990   yes Patient  Verified  Visit #:   1   of   36  Insurance: Payor: Charline Tate carli / Plan: 231 [facility] / Product Type: Managed Care Medicaid /      In time: 148 Out time: 239   Total Treatment Time: 51     TREATMENT AREA =  Left knee pain [M25.562]    SUBJECTIVE  Pain Level (on 0 to 10 scale):  6  / 10   Medication Changes/New allergies or changes in medical history, any new surgeries or procedures?    no  If yes, update Summary List   Subjective Functional Status/Changes:  []  No changes reported     See POC          OBJECTIVE  5 min Self Care: Pt education on HEP, POC, prognosis, and diagnosis. Rationale:    Improve pt understanding to improve the patients ability to progress therapy at home. Billed With/As:   [] TE   [] TA   [] Neuro   [x] Self Care Patient Education: [x] Review HEP    [] Progressed/Changed HEP based on:   [] positioning   [] body mechanics   [] transfers   [] heat/ice application    [] other:      Other Objective/Functional Measures:    Shown and performed HEP     Post Treatment Pain Level (on 0 to 10) scale:   6  / 10     ASSESSMENT  Assessment/Changes in Function:     See POC     []  See Progress Note/Recertification   Patient will continue to benefit from skilled PT services to modify and progress therapeutic interventions, address functional mobility deficits, analyze and cue movement patterns, and analyze and modify body mechanics/ergonomics to attain remaining goals.    Progress toward goals / Updated goals:    See POC     PLAN  [x]  Upgrade activities as tolerated yes Continue plan of care   []  Discharge due to :    []  Other: Frequency: 2-3x/week for 12 weeks     Therapist: Dilan Haines, PT , DPT    Date: 2022 Time: 12:34 PM     Future Appointments   Date Time Provider Diana Keene   2022  2:00 PM Jessi Pruett, PT [facility]   2022 11:15 AM Ruthy Resendez, PT [facility]   7/7/2022  9:45 AM Ruthy Resendez, PT [facility]   7/8/2022  8:45 AM Niesha Port [facility]   7/13/2022  9:15 AM Niesha Port [facility]   7/14/2022  9:30 AM Niesha Port [facility]   7/15/2022  8:00 AM [facility]   7/18/2022  3:00 PM Ruthy Resendez, PT [facility]   7/20/2022  8:00 AM Ruthy Resendez, PT [facility]   7/22/2022  8:00 AM Niesha Port [facility]   7/25/2022  9:00 AM Ruthy Resendez, PT [facility]   7/27/2022  1:15 PM Niesha Port [facility]   7/29/2022  9:00 AM Ruthy Resendez, PT [facility]   8/1/2022  9:00 AM Ruthy Resendez, PT [facility]   8/3/2022  8:00 AM Ruthy Resendez, PT [facility]   8/5/2022  8:00 AM [facility]   8/8/2022  9:00 AM Ruthy Resendez, PT [facility]   8/10/2022  8:00 AM Ruthy Resendez, PT [facility]   8/12/2022  8:00 AM Niesha Port [facility]   8/15/2022  9:00 AM Ruthy Resendez, PT [facility]   8/17/2022  8:00 AM Ruthy Resendez, PT [facility]   8/19/2022  8:00 AM Niesha Port [facility]   8/22/2022  9:00 AM Ruthy Resendez, PT [facility]   8/24/2022  8:00 AM Ruthy Resendez, PT [facility]   8/26/2022  8:00 AM Niesha Port [facility]   8/29/2022  9:00 AM Ruthy Resendez, PT [facility]   8/31/2022  8:00 AM Ruthy Resendez, PT [facility]   9/2/2022  8:00 AM Graciela Oates [facility]

## 2022-07-05 ENCOUNTER — HOSPITAL ENCOUNTER (OUTPATIENT)
Dept: PHYSICAL THERAPY | Age: 32
Discharge: HOME | End: 2022-07-05
Payer: MEDICAID

## 2022-07-05 PROCEDURE — 97112 NEUROMUSCULAR REEDUCATION: CPT

## 2022-07-05 PROCEDURE — 97110 THERAPEUTIC EXERCISES: CPT

## 2022-07-05 PROCEDURE — 97016 VASOPNEUMATIC DEVICE THERAPY: CPT

## 2022-07-05 PROCEDURE — 97535 SELF CARE MNGMENT TRAINING: CPT

## 2022-07-05 NOTE — PROGRESS NOTES
PHYSICAL THERAPY - DAILY TREATMENT NOTE    Patient Name: Chino Do        Date: 2022  : 1990   yes Patient  Verified  Visit #:   2      36  Insurance: Payor: [address] / Plan: [address] [facility] / Product Type: Managed Care Medicaid /      In time:  Out time: 502   Total Treatment Time: 53     TREATMENT AREA =  Left knee pain [M25.562]    SUBJECTIVE  Pain Level (on 0 to 10 scale):  5  / 10   Medication Changes/New allergies or changes in medical history, any new surgeries or procedures?    no  If yes, update Summary List   Subjective Functional Status/Changes:  []  No changes reported   Reports she got back on the oxycodone but isn't taking it as often as she was. Johana Wood with the crutches has gotten easier but notes she feels like its gotten stiffer.        OBJECTIVE  Modalities Rationale:     decrease edema and decrease pain to improve patient's ability to perform transfers   min [] Estim, type/location:                                      []  att     []  unatt     []  w/US     []  w/ice    []  w/heat    min []  Mechanical Traction: type/lbs                   []  pro   []  sup   []  int   []  cont    []  before manual    []  after manual    min []  Ultrasound, settings/location:      min []  Iontophoresis w/ dexamethasone, location:                                               []  take home patch       []  in clinic    min []  Ice     []  Heat    location/position:    10 min [x]  Vasopneumatic Device, press/temp: Mod, 36deg on L leg elevated   If using vaso (only need to measure limb vaso being performed on)      pre-treatment girth : 51.6cm      post-treatment girth : 50.2cm      measured at (landmark location) :  Superior patella    min []  Other:    [x] Skin assessment post-treatment (if applicable):    [x]  intact    []  redness- no adverse reaction                  []redness - adverse reaction:        15 min Therapeutic Exercise:  [x]  See flow sheet   Rationale: increase ROM, increase strength and improve coordination to improve the patients ability to recruit quad     14 min Neuromuscular Re-ed: [x]  See flow sheet   Rationale:    increase strength, improve coordination, improve balance and increase proprioception to improve the patients ability to have appropriate gait. 4 min Manual Therapy: PROM knee flex at EOB, gentle overpressure with extension. Rationale:      increase ROM to improve patient's ability to perform gait and transfers. The manual therapy interventions were performed at a separate and distinct time from the therapeutic activities interventions. 10 min Self Care: Pt education on HEP, POC, prognosis, and diagnosis. Brace and post op precautions   Rationale:    Improve pt understanding to improve the patients ability to progress therapy at home. Billed With/As:   [] TE   [] TA   [] Neuro   [x] Self Care Patient Education: [x] Review HEP    [] Progressed/Changed HEP based on:   [] positioning   [] body mechanics   [] transfers   [] heat/ice application    [] other:      Other Objective/Functional Measures:    See flow sheet for exercises performed  ROM 0-87     Post Treatment Pain Level (on 0 to 10) scale:   5  / 10     ASSESSMENT  Assessment/Changes in Function:     No complaints with session. Performed PROM f/b gentle therex to improve quad recruitment. Performed 3 way SLR with brace due to weak quad and standing weight shifts and heel raises for improved WB. Vaso to conclude session and decreased edema. Will continue to progress as tolerated. []  See Progress Note/Recertification   Patient will continue to benefit from skilled PT services to modify and progress therapeutic interventions, address functional mobility deficits, analyze and cue movement patterns, and analyze and modify body mechanics/ergonomics to attain remaining goals. Progress toward goals / Updated goals:    Progressing with quad set strength and coordination. PLAN  [x]  Upgrade activities as tolerated yes Continue plan of care   []  Discharge due to :    []  Other: Frequency: 2-3x/week for 12 weeks     Therapist: Yarely Edmonds PT , DPT    Date: 7/5/2022 Time: 12:34 PM     Future Appointments   Date Time Provider Diana Keene   7/5/2022 11:15 AM Delaplainecindy Leija, PT [facility]   7/7/2022  9:45 AM Delaplaine Liss, PT [address]   7/8/2022  8:45 AM Chanthu Wick   7/13/2022  9:15 AM Lorna Blush [facility]   7/14/2022  9:30 AM Rhonda [address]   7/15/2022  8:00 AM Rhonda WOODWARD [address]   7/18/2022  3:00 PM Jorje Leija, PT [facility]   7/20/2022  8:00 AM Delaplaine Liss, PT [facility]   7/22/2022  8:00 AM Lorna Blush [facility]   7/25/2022  9:00 AM Delaplainesurendra Leija, PT [facility]   7/27/2022  1:15 PM Jan Staff [facility]   7/29/2022  9:00 AM Delaplaine Liss, PT [facility]   8/1/2022  9:00 AM Delaplaine Liss, PT [facility]   8/3/2022  8:00 AM Delaplaine Liss, PT [facility]   8/5/2022  8:00 AM Lorna Blush [facility]   8/8/2022  9:00 AM Delaplaine Liss, PT [facility]   8/10/2022  8:00 AM Delaplaine Liss, PT [facility]   8/12/2022  8:00 AM Lorna Blush [facility]   8/15/2022  9:00 AM Delaplaine Liss, PT [facility]   8/17/2022  8:00 AM Delaplaine Liss, PT [facility]   8/19/2022  8:00 AM Lorna Blush [facility]   8/22/2022  9:00 AM Delaplaine Liss, PT [facility]   8/24/2022  8:00 AM Delaplaine Liss, PT [facility]   8/26/2022  8:00 AM Lorna Blush [facility]   8/29/2022  9:00 AM Delaplaine Liss, PT [facility]   8/31/2022  8:00 AM Delaplaine Liss, PT [facility]   9/2/2022  8:00 AM Jan Staff [facility]

## 2022-07-07 ENCOUNTER — HOSPITAL ENCOUNTER (OUTPATIENT)
Dept: PHYSICAL THERAPY | Age: 32
Discharge: HOME | End: 2022-07-07
Payer: MEDICAID

## 2022-07-07 PROCEDURE — 97110 THERAPEUTIC EXERCISES: CPT

## 2022-07-07 PROCEDURE — 97016 VASOPNEUMATIC DEVICE THERAPY: CPT

## 2022-07-07 PROCEDURE — 97112 NEUROMUSCULAR REEDUCATION: CPT

## 2022-07-07 NOTE — PROGRESS NOTES
PHYSICAL THERAPY - DAILY TREATMENT NOTE    Patient Name: Yassine Barboza        Date: 2022  : 1990   yes Patient  Verified  Visit #:   3      36  Insurance: Payor: [address] [facility] / Plan: [address] [facility] / Product Type: Managed Care Medicaid /      In time: 940 Out time:    Total Treatment Time: 58     TREATMENT AREA =  Left knee pain [M25.562]    SUBJECTIVE  Pain Level (on 0 to 10 scale):  5  / 10   Medication Changes/New allergies or changes in medical history, any new surgeries or procedures?    no  If yes, update Summary List   Subjective Functional Status/Changes:  []  No changes reported     MD argueta went well yesterday. Notes he was happy about the ROM and quad function. Wants her to do 300 quad sets a day.        OBJECTIVE  Modalities Rationale:     decrease edema and decrease pain to improve patient's ability to perform transfers   min [] Estim, type/location:                                      []  att     []  unatt     []  w/US     []  w/ice    []  w/heat    min []  Mechanical Traction: type/lbs                   []  pro   []  sup   []  int   []  cont    []  before manual    []  after manual    min []  Ultrasound, settings/location:      min []  Iontophoresis w/ dexamethasone, location:                                               []  take home patch       []  in clinic    min []  Ice     []  Heat    location/position:    10 min [x]  Vasopneumatic Device, press/temp: Mod, 36deg on L leg elevated   If using vaso (only need to measure limb vaso being performed on)      pre-treatment girth : 51.4cm      post-treatment girth : 50cm      measured at (landmark location) :  Superior patella    min []  Other:    [x] Skin assessment post-treatment (if applicable):    [x]  intact    []  redness- no adverse reaction                  []redness - adverse reaction:        33 min Therapeutic Exercise:  [x]  See flow sheet   Rationale:      increase ROM, increase strength and improve coordination to improve the patients ability to recruit quad     15 min Neuromuscular Re-ed: [x]  See flow sheet   Rationale:    increase strength, improve coordination, improve balance and increase proprioception to improve the patients ability to have appropriate gait. Billed With/As:   [x] TE   [] TA   [] Neuro   [] Self Care Patient Education: [x] Review HEP    [] Progressed/Changed HEP based on:   [] positioning   [] body mechanics   [] transfers   [] heat/ice application    [] other:      Other Objective/Functional Measures:    See flow sheet for exercises performed  ROM 0-82    Access Code: [email or web address]  URL: [email or web address]  Date: 07/07/2022  Prepared by: [employer] Plaster    Exercises  Long Sitting Quad Set - 3 x daily - 7 x weekly - 100 reps - 5sec hold  Active Straight Leg Raise with Quad Set - 3 x daily - 7 x weekly - 2 sets - 10 reps  Sidelying Hip Abduction - 3 x daily - 7 x weekly - 2 sets - 10 reps  Prone Hip Extension - 3 x daily - 7 x weekly - 2 sets - 10 reps  Supine Heel Slide - 3 x daily - 7 x weekly - 10 reps  Standing Heel Raise - 3 x daily - 7 x weekly - 3 sets - 10 reps       Post Treatment Pain Level (on 0 to 10) scale:   3  / 10     ASSESSMENT  Assessment/Changes in Function:     No adverse reactions with session. Added mini squats and step ups this date with brace unlocked and no complaints. ROM looks good. Will continue to progress as tolerated. []  See Progress Note/Recertification   Patient will continue to benefit from skilled PT services to modify and progress therapeutic interventions, address functional mobility deficits, analyze and cue movement patterns, and analyze and modify body mechanics/ergonomics to attain remaining goals.    Progress toward goals / Updated goals:    Progressing with quad strength      PLAN  [x]  Upgrade activities as tolerated yes Continue plan of care   []  Discharge due to :    []  Other: Frequency: 2-3x/week for 12 weeks Therapist: Tejas Short PT , DPT    Date: 7/7/2022 Time: 12:34 PM     Future Appointments   Date Time Provider Diana Yecenia   7/7/2022  9:45 AM Cynthia Preciado, PT [facility]   7/8/2022  8:45 AM Gemma Fragmin [facility]   7/13/2022  9:15 AM [place] Hotter [facility]   7/14/2022  9:30 AM Reid Frannie M [facility]   7/15/2022  8:00 AM Gemma Fragmin [facility]   7/18/2022  3:00 PM Cynthia Preciado, PT [facility]   7/20/2022  8:00 AM Cynthia Preciado, PT [facility]   7/22/2022  8:00 AM Carlene Hotter [facility]   7/25/2022  9:00 AM Cynthia Preciado, PT [facility]   7/27/2022  1:15 PM Gemmanaila Lugo [facility]   7/29/2022  9:00 AM Cynthia Preciado, PT [facility]   8/1/2022  9:00 AM Cynthia Preciado, PT [facility]   8/3/2022  8:00 AM Cynthia Preciado, PT [facility]   8/5/2022  8:00 AM [place] Hotter [facility]   8/8/2022  9:00 AM Cynthia Preciado, PT [facility]   8/10/2022  8:00 AM Cynthia Preciado, PT [facility]   8/12/2022  8:00 AM Carlene Hotter [facility]   8/15/2022  9:00 AM Cynthia Preciado, PT [facility]   8/17/2022  8:00 AM Cynthia Preciado, PT [facility]   8/19/2022  8:00 AM Carlene Hotter [facility]   8/22/2022  9:00 AM Cynthia Preciado, PT [facility]   8/24/2022  8:00 AM Cynthia Preciado, PT [facility]   8/26/2022  8:00 AM Carlene Hotter [facility]   8/29/2022  9:00 AM Cynthia Preciado, PT [facility]   8/31/2022  8:00 AM Cynthia Preciado, PT [facility]   9/2/2022  8:00 AM Gemma Lugo [facility]

## 2022-07-08 ENCOUNTER — HOSPITAL ENCOUNTER (OUTPATIENT)
Dept: PHYSICAL THERAPY | Age: 32
Discharge: HOME | End: 2022-07-08
Payer: MEDICAID

## 2022-07-08 PROCEDURE — 97016 VASOPNEUMATIC DEVICE THERAPY: CPT

## 2022-07-08 PROCEDURE — 97530 THERAPEUTIC ACTIVITIES: CPT

## 2022-07-08 PROCEDURE — 97110 THERAPEUTIC EXERCISES: CPT

## 2022-07-08 PROCEDURE — 97112 NEUROMUSCULAR REEDUCATION: CPT

## 2022-07-08 NOTE — PROGRESS NOTES
PHYSICAL THERAPY - DAILY TREATMENT NOTE    Patient Name: Luisa Maldonado        Date: 2022  : 1990   yes Patient  Verified  Visit #:   4   of   36  Insurance: Payor: [address] / [facility] / Product Type: Managed Care Medicaid /      In time: 8:43 Out time: 9:48   Total Treatment Time: 65     Medicare/BCBS Time Tracking (below)   Total Timed Codes (min):  60 1:1 Treatment Time:  n/a     TREATMENT AREA =  Left knee pain [M25.562]    SUBJECTIVE  Pain Level (on 0 to 10 scale): 6  / 10   Medication Changes/New allergies or changes in medical history, any new surgeries or procedures?    no  If yes, update Summary List   Subjective Functional Status/Changes:  []  No changes reported       Patient reports having more swelling and pain last night. Contributed this to the weather.  Still had some R calf pain           OBJECTIVE  Modalities Rationale:     decrease edema and decrease pain to improve patient's ability to perform transfers   min [] Estim, type/location:                                      []  att     []  unatt     []  w/US     []  w/ice    []  w/heat    min []  Mechanical Traction: type/lbs                   []  pro   []  sup   []  int   []  cont    []  before manual    []  after manual    min []  Ultrasound, settings/location:      min []  Iontophoresis w/ dexamethasone, location:                                               []  take home patch       []  in clinic    min []  Ice     []  Heat    location/position:    10 min [x]  Vasopneumatic Device, press/temp: Mod, 36deg on L LE on wedge and HOB elevated post-session   If using vaso (only need to measure limb vaso being performed on)      pre-treatment girth : 54.5 cm, 50 cm, 47 cm       post-treatment girth : 54 cm, 47 cm, 47 cm      measured at (landmark location) :  Superior patella    min []  Other:    [x] Skin assessment post-treatment (if applicable):    [x]  intact    []  redness- no adverse reaction []redness - adverse reaction:        20 min Therapeutic Exercise:  [x]  See flow sheet   Rationale:      increase ROM, increase strength and improve coordination to improve the patients ability to recruit quad     15 min Therapeutic Activity: [x]  See flow sheet   Rationale:    increase ROM and improve coordination to improve the patients ability to safely negotiate with AD within the community. 15 min Neuromuscular Re-ed: [x]  See flow sheet   Rationale:    increase strength, improve coordination, improve balance and increase proprioception to improve the patients ability to have appropriate gait. 5 Min Manual Therapy: L knee PROM flex to 90 (NB)   Rationale:      decrease pain and increase ROM to improve patient's ability to decrease c/o stiffness. The manual therapy interventions were performed at a separate and distinct time from the therapeutic activities interventions. Billed With/As:   [x] TE   [] TA   [] Neuro   [] Self Care Patient Education: [x] Review HEP    [] Progressed/Changed HEP based on:   [] positioning   [] body mechanics   [] transfers   [] heat/ice application    [] other:      Other Objective/Functional Measures:    *continued exercises per flowsheet  *pt noting stiffness with AAROM flex today due to swelling  *performed 1 set of standing HR (10x) followed by 30 seconds of incline board stretch to reduce c/o strain in R calf. Discussed signs of DVT/red flags. Post Treatment Pain Level (on 0 to 10) scale:   1  / 10     ASSESSMENT  Assessment/Changes in Function:     Patient noted significant reduction in pain level following PT session. Discussed icing and elevating the L LE at the end of the day to reduce swelling and pain. Pt acknowledged understanding.       []  See Progress Note/Recertification   Patient will continue to benefit from skilled PT services to modify and progress therapeutic interventions, address functional mobility deficits, analyze and cue movement patterns, and analyze and modify body mechanics/ergonomics to attain remaining goals.    Progress toward goals / Updated goals:    Progressing with quad strength      PLAN  [x]  Upgrade activities as tolerated yes Continue plan of care   []  Discharge due to :    []  Other: F     Therapist: BONY Alas    Date: 7/8/2022 Time: 12:38 PM     Future Appointments   Date Time Provider Diana Yecenia   7/8/2022  8:45 AM Dana Wild [facility]   7/13/2022  9:15 AM Irion Wild [facility]   7/14/2022  9:30 AM Gualberto [address]   7/15/2022  8:00 AM Gualberto Miller [address]   7/18/2022  3:00 PM Palmer Mariscal,  [address] [facility]   7/20/2022  8:00 AM Palmer Mariscal,  [address] [facility]   7/22/2022  8:00 AM Irion Wild [facility]   7/25/2022  9:00 AM Palmer Mariscal,  [address] [facility]   7/27/2022  1:15 PM Dana Wild [facility]   7/29/2022  9:00 AM Palmer Mariscal,  [address] [facility]   8/1/2022  9:00 AM Gurley Loida,  [address] [facility]   8/3/2022  8:00 AM Palmer Mariscal,  [address] [facility]   8/5/2022  8:00 AM Irion Wild [facility]   8/8/2022  9:00 AM Palmer Mariscal,  [address] [facility]   8/10/2022  8:00 AM Palmer Mariscal,  [facility]   8/12/2022  8:00 AM Dana Rome [facility]   8/15/2022  9:00 AM Palmer Mariscal,  [facility]   8/17/2022  8:00 AM Palmer Mariscal,  [facility]   8/19/2022  8:00 AM Dana Rome [facility]   8/22/2022  9:00 AM Palmer Mariscal,  [facility]   8/24/2022  8:00 AM Palmer Mariscal,  [facility]   8/26/2022  8:00 AM Dana Rome [facility]   8/29/2022  9:00 AM Palmer Mariscal,  [facility]   8/31/2022  8:00 AM Palmer Mariscal,  [facility]   9/2/2022  8:00 AM Ruben Baeza [address] [facility]

## 2022-07-11 ENCOUNTER — APPOINTMENT (OUTPATIENT)
Dept: PHYSICAL THERAPY | Age: 32
End: 2022-07-11
Payer: MEDICAID

## 2022-07-13 ENCOUNTER — HOSPITAL ENCOUNTER (OUTPATIENT)
Dept: PHYSICAL THERAPY | Age: 32
Discharge: HOME | End: 2022-07-13
Payer: MEDICAID

## 2022-07-13 PROCEDURE — 97016 VASOPNEUMATIC DEVICE THERAPY: CPT

## 2022-07-13 PROCEDURE — 97530 THERAPEUTIC ACTIVITIES: CPT

## 2022-07-13 PROCEDURE — 97112 NEUROMUSCULAR REEDUCATION: CPT

## 2022-07-13 PROCEDURE — 97535 SELF CARE MNGMENT TRAINING: CPT

## 2022-07-13 PROCEDURE — 97110 THERAPEUTIC EXERCISES: CPT

## 2022-07-13 NOTE — PROGRESS NOTES
PHYSICAL THERAPY - DAILY TREATMENT NOTE    Patient Name: Paul Robles        Date: 2022  : 1990   yes Patient  Verified  Visit #:   5   of   36  Insurance: Payor: [address] / [facility] / Product Type: Managed Care Medicaid /      In time: 9:14 Out time: 10:19   Total Treatment Time: 65     Medicare/BCBS Time Tracking (below)   Total Timed Codes (min):  65 1:1 Treatment Time:  n/a     TREATMENT AREA =  Left knee pain [M25.562]    SUBJECTIVE  Pain Level (on 0 to 10 scale): 5  / 10   Medication Changes/New allergies or changes in medical history, any new surgeries or procedures?    no  If yes, update Summary List   Subjective Functional Status/Changes:  []  No changes reported       Patient reports noticing the medial aspect of the knee isn't as swollen and the R calf isn't as bothersome, however noticed the swelling moved to the lateral knee. States she had 2 instances of knee buckling since LV - had it at 1x at home and 1x in the grocery store. Reports she was utilizing 1 crutch        Blood Flow Restriction Procedure Note    Blood Flow Restriction Session Number:  1    If applicable, were there any adverse reactions to the last blood flow restriction therapy session? no      Chart reviewed for the following:  IJp, have reviewed the medical history, summary list and precautions/contraindications for Paul Robles.     TIME OUT performed immediately prior to start of procedure:  9:20 AM (enter time the timeout was completed)  Jp GALLEGOS, have performed the following reviews on Paul Robles prior to the start of the session:        [x] Patient was identified by name and date of birth    [x] Purpose of blood flow restriction therapy, side effects, possible complications, risks and benefits were explained to the patient   [x] Procedure site(s) verified  [x] Informed Consent was signed (initial visit) and verified verbally (subsequent visits)  [x] Patient was instructed on the need to report any new medical conditions, procedures or medications.   [x] How to respond to possible adverse effects of treatment  [x] Opportunity was given to ask any questions, all questions were answered            Location(s) of blood flow restriction cuffs:   UE:   []  Right     []  Left             LE:    []  Right    [x]  Left           Pressure applied at the cuffs: 400 mm Hg (80% of 500 mg Hg)    Patients response to todays treatment:   [x]  General muscle soreness []  Numbness/tingling of extremity    []  Dizziness      []  Other: n/a     [x] Skin assessment post-treatment:    [x]  intact    []  redness- no adverse reaction     []  redness - adverse reaction:          OBJECTIVE  Modalities Rationale:     decrease edema and decrease pain to improve patient's ability to perform transfers   min [] Estim, type/location:                                      []  att     []  unatt     []  w/US     []  w/ice    []  w/heat    min []  Mechanical Traction: type/lbs                   []  pro   []  sup   []  int   []  cont    []  before manual    []  after manual    min []  Ultrasound, settings/location:      min []  Iontophoresis w/ dexamethasone, location:                                               []  take home patch       []  in clinic    min []  Ice     []  Heat    location/position:    10 min [x]  Vasopneumatic Device, press/temp: Mod, 36deg on L LE on wedge and HOB elevated post-session   If using vaso (only need to measure limb vaso being performed on)      pre-treatment girth : 48 cm       post-treatment girth :47 cm       measured at (landmark location) :  Mid patella    min []  Other:    [x] Skin assessment post-treatment (if applicable):    [x]  intact    []  redness- no adverse reaction                  []redness - adverse reaction:        22 min Therapeutic Exercise:  [x]  See flow sheet   Rationale:      increase ROM, increase strength and improve coordination to improve the patients ability to recruit quad     10 min Therapeutic Activity: [x]  See flow sheet   Rationale:    increase ROM and improve coordination to improve the patients ability to safely negotiate curbs/stairs with AD within the community. 15 min Neuromuscular Re-ed: [x]  See flow sheet   Rationale:    increase strength, improve coordination, improve balance and increase proprioception to improve the patients ability to have appropriate gait with correct muscle activation. 8 min Self Care: Reviewed HEP. Reviewed current diagnosis, prognosis, and POC. Discussed ambulation with bilateral axillary crutches in public for safety. Rationale:  to improve understanding of current diagnosis with realistic expectation of PT to improve compliance/adherence and satisfaction. Billed With/As:   [x] TE   [] TA   [] Neuro   [] Self Care Patient Education: [x] Review HEP    [] Progressed/Changed HEP based on:   [] positioning   [] body mechanics   [] transfers   [] heat/ice application    [] other:      Other Objective/Functional Measures:    *initiated BFR: quad sets for 5 minutes (5 sec ON: 5 sec OFF)  *continued with remaining exercises     Post Treatment Pain Level (on 0 to 10) scale:   2 / 10     ASSESSMENT  Assessment/Changes in Function:     Patient demo good quad strength thus far indicated by no extensor lag during SLR. Requires encouragement during amb with 1 axillary crutch for increase weightbearing, otherwise able to demo fair gait mechanics overall. []  See Progress Note/Recertification   Patient will continue to benefit from skilled PT services to modify and progress therapeutic interventions, address functional mobility deficits, analyze and cue movement patterns, and analyze and modify body mechanics/ergonomics to attain remaining goals. Progress toward goals / Updated goals: · Short Term Goals: To be accomplished in  6  weeks:  1.  Independent with HEP to progress to meet goals.  2. Pt to report decreased max pain levels less than 6/10 for improvement in ADLs. 3. Pt to demonstrate appropriate gait without crutches and good quad control to improve community ambulation. Progressing 07/13 - good quad control with SLR and fair gait with 1 axillary crutch  · Long Term Goals: To be accomplished in  12  weeks:  1. Improve FOTO score to 45/100 to show a significant functional change. 2. Pt to report decreased max pain levels less than 3/10 for improvement in QoL. 3. Pt to demonstrate AROM of L knee to 0-125 for ease of functional activities.       PLAN  [x]  Upgrade activities as tolerated yes Continue plan of care   []  Discharge due to :    []  Other:      Therapist: BONY Hanley    Date: 7/13/2022 Time: 12:22 PM     Future Appointments   Date Time Provider Diana Keene   7/14/2022  9:30 AM Janean Leventhal   7/15/2022  8:00 AM Ophelia Rausch [facility]   7/18/2022  3:00 PM Mapleton Lynn, PT [facility]   7/20/2022  8:00 AM [facility]   7/22/2022  8:00 AM Ophelia Rausch [facility]   7/25/2022  9:00 AM [facility]   7/27/2022  1:15 PM Ophelia Rausch [facility]   7/29/2022  9:00 AM Mapleton Mathews, PT [facility]   8/1/2022  9:00 AM Mapleton Lynn, PT [facility]   8/3/2022  8:00 AM Mapleton Mathews, PT [facility]   8/5/2022  8:00 AM Ophelia Rausch [facility]   8/8/2022  9:00 AM Jyothi Bailey, PT [facility] [place]   8/10/2022  8:00 AM Jyothi Bailey, PT [facility]   8/12/2022  8:00 AM Ophelia Rausch [facility]   8/15/2022  9:00 AM Jyothi Bailey, PT [facility]   8/17/2022  8:00 AM Jyothi Bailey, PT [facility]   8/19/2022  8:00 AM Op[facility]ia Raus[facility]   8/22/2022  9:00 AM Jyothi Bailey, PT [facility]   8/24/2022  8:00 AM Jyothi Bailey, PT [facility]   8/26/2022  8:00 AM Ophelia Rausch [facility]   8/29/2022  9:00 AM Jyothi Bailey, PT [facility]   8/31/2022  8:00 AM Jyothi Bailey, [facility] [place]   9/2/2022  8:00 AM Dina Carlisle [facility]

## 2022-07-14 ENCOUNTER — HOSPITAL ENCOUNTER (OUTPATIENT)
Dept: PHYSICAL THERAPY | Age: 32
Discharge: HOME | End: 2022-07-14
Payer: MEDICAID

## 2022-07-14 PROCEDURE — 97535 SELF CARE MNGMENT TRAINING: CPT

## 2022-07-14 PROCEDURE — 97530 THERAPEUTIC ACTIVITIES: CPT

## 2022-07-14 PROCEDURE — 97110 THERAPEUTIC EXERCISES: CPT

## 2022-07-14 PROCEDURE — 97112 NEUROMUSCULAR REEDUCATION: CPT

## 2022-07-14 PROCEDURE — 97016 VASOPNEUMATIC DEVICE THERAPY: CPT

## 2022-07-14 NOTE — PROGRESS NOTES
PHYSICAL THERAPY - DAILY TREATMENT NOTE    Patient Name: Maira Richards        Date: 2022  : 1990   yes Patient  Verified  Visit #:   6   of   36  Insurance: Payor: [address] / Plan: [address] [employer] / Product Type: Managed Care Medicaid /      In time: 9:19 Out time: 10:24   Total Treatment Time: 65     Medicare/BCBS Time Tracking (below)   Total Timed Codes (min):  65 1:1 Treatment Time:  n/a     TREATMENT AREA =  Left knee pain [M25.562]    SUBJECTIVE  Pain Level (on 0 to 10 scale): 2 / 10   Medication Changes/New allergies or changes in medical history, any new surgeries or procedures?    no  If yes, update Summary List   Subjective Functional Status/Changes:  []  No changes reported       Patient reports having stiffness this morning, especially with bending. States she took 2x Tylenol this AM prior to PT        Blood Flow Restriction Procedure Note    Blood Flow Restriction Session Number:  2    If applicable, were there any adverse reactions to the last blood flow restriction therapy session? no      Chart reviewed for the following:  Bandar GALLEGOS, have reviewed the medical history, summary list and precautions/contraindications for Maira Richards. TIME OUT performed immediately prior to start of procedure:  9:29 AM (enter time the timeout was completed)  Bandar GALLEGOS, have performed the following reviews on Maira Richards prior to the start of the session:        [x] Patient was identified by name and date of birth    [x] Purpose of blood flow restriction therapy, side effects, possible complications, risks and benefits were explained to the patient   [x] Procedure site(s) verified  [x] Informed Consent was signed (initial visit) and verified verbally (subsequent visits)  [x] Patient was instructed on the need to report any new medical conditions, procedures or medications.   [x] How to respond to possible adverse effects of treatment  [x] Opportunity was given to ask any questions, all questions were answered            Location(s) of blood flow restriction cuffs:   UE:   []  Right     []  Left             LE:    []  Right    [x]  Left           Pressure applied at the cuffs: 320 mm Hg (80% of 400 mg Hg)    Patients response to todays treatment:   [x]  General muscle soreness []  Numbness/tingling of extremity    []  Dizziness      []  Other: n/a     [x] Skin assessment post-treatment:    [x]  intact    []  redness- no adverse reaction     []  redness - adverse reaction:          OBJECTIVE  Modalities Rationale:     decrease edema and decrease pain to improve patient's ability to perform transfers   min [] Estim, type/location:                                      []  att     []  unatt     []  w/US     []  w/ice    []  w/heat    min []  Mechanical Traction: type/lbs                   []  pro   []  sup   []  int   []  cont    []  before manual    []  after manual    min []  Ultrasound, settings/location:      min []  Iontophoresis w/ dexamethasone, location:                                               []  take home patch       []  in clinic    min []  Ice     []  Heat    location/position:    10 min [x]  Vasopneumatic Device, press/temp: Mod, 36deg on L LE on wedge and HOB elevated post-session   If using vaso (only need to measure limb vaso being performed on)      pre-treatment girth : 48.5 cm       post-treatment girth :47 cm       measured at (landmark location) :  Mid patella    min []  Other:    [x] Skin assessment post-treatment (if applicable):    [x]  intact    []  redness- no adverse reaction                  []redness - adverse reaction:        20 min Therapeutic Exercise:  [x]  See flow sheet   Rationale:      increase ROM, increase strength and improve coordination to improve the patients ability to recruit quad     15 min Therapeutic Activity: [x]  See flow sheet   Rationale:    increase ROM and improve coordination to improve the patients ability to safely negotiate curbs/stairs with AD within the community. 12 min Neuromuscular Re-ed: [x]  See flow sheet   Rationale:    increase strength, improve coordination, improve balance and increase proprioception to improve the patients ability to have appropriate gait with correct muscle activation. 8 min Self Care: Discussed POC, surgical procedure, and PT protocol. Will be 3 weeks post op 07/15    Rationale:  to improve understanding of current diagnosis with realistic expectation of PT to improve compliance/adherence and satisfaction. Billed With/As:   [x] TE   [] TA   [] Neuro   [] Self Care Patient Education: [x] Review HEP    [] Progressed/Changed HEP based on:   [] positioning   [] body mechanics   [] transfers   [] heat/ice application    [] other:      Other Objective/Functional Measures:    *demo good quad activation with QS, therefore progressed to SLR with quad set without brace. *increase sets for 4 inch step ups to improve LE strength     Post Treatment Pain Level (on 0 to 10) scale:   3 / 10     ASSESSMENT  Assessment/Changes in Function:     Able to maintain straight leg with SLR flex - initially demo extensor lag, however provided tactile cues to facilitate quadriceps and pt was able to demo no lag. Will continue to progress PT interventions per MD protocol, POC, and pt's tolerance. []  See Progress Note/Recertification   Patient will continue to benefit from skilled PT services to modify and progress therapeutic interventions, address functional mobility deficits, analyze and cue movement patterns, and analyze and modify body mechanics/ergonomics to attain remaining goals. Progress toward goals / Updated goals: · Short Term Goals: To be accomplished in  6  weeks:  1. Independent with HEP to progress to meet goals. Met 07/14   2. Pt to report decreased max pain levels less than 6/10 for improvement in ADLs.   3. Pt to demonstrate appropriate gait without crutches and good quad control to improve community ambulation. Progressing 07/13 - good quad control with SLR and fair gait with 1 axillary crutch  · Long Term Goals: To be accomplished in  12  weeks:  1. Improve FOTO score to 45/100 to show a significant functional change. 2. Pt to report decreased max pain levels less than 3/10 for improvement in QoL. 3. Pt to demonstrate AROM of L knee to 0-125 for ease of functional activities.       PLAN  [x]  Upgrade activities as tolerated yes Continue plan of care   []  Discharge due to :    []  Other:      Therapist: BONY Shah    Date: 7/14/2022 Time: 1:31 PM     Future Appointments   Date Time Provider Diana Keene   7/15/2022  8:00 AM Yash Gauthier   7/18/2022  3:00 PM Donnasa Levy,  [address] [facility]   7/20/2022  8:00 AM Donna Horsfall,  [address] [facility]   7/22/2022  8:00 AM Jose Martinez [facility]   7/25/2022  9:00 AM Donna Horsfall,  [address] [facility]   7/27/2022  1:15 PM Jose Martinez [facility]   7/29/2022  9:00 AM Donna Horsfall,  [address] [facility]   8/1/2022  9:00 AM Donna Horsfall,  [address] [facility]   8/3/2022  8:00 AM Donna Horsfall,  [address] [facility]   8/5/2022  8:00 AM Jose Martinez [facility]   8/8/2022  9:00 AM Donna Horsfall,  [address] [facility]   8/10/2022  8:00 AM Donna Horsfall,  [address] [facility]   8/12/2022  8:00 AM Jose Martinez [facility]   8/15/2022  9:00 AM Donna Levy,  [facility]   8/17/2022  8:00 AM Donna Levy,  [facility]   8/19/2022  8:00 AM Jose Martinez [facility]   8/22/2022  9:00 AM Donna Levy,  [facility]   8/24/2022  8:00 AM Donna Levy,  [facility]   8/26/2022  8:00 AM Jose Martinez [facility]   8/29/2022  9:00 AM Donna Levy,  [facility]   8/31/2022  8:00 AM Donna Levy,  [facility]   9/2/2022  8:00 AM Hilary Coil [address] [facility]

## 2022-07-15 ENCOUNTER — HOSPITAL ENCOUNTER (OUTPATIENT)
Dept: PHYSICAL THERAPY | Age: 32
Discharge: HOME | End: 2022-07-15
Payer: MEDICAID

## 2022-07-15 PROCEDURE — 97116 GAIT TRAINING THERAPY: CPT

## 2022-07-15 PROCEDURE — 97530 THERAPEUTIC ACTIVITIES: CPT

## 2022-07-15 PROCEDURE — 97112 NEUROMUSCULAR REEDUCATION: CPT

## 2022-07-15 PROCEDURE — 97110 THERAPEUTIC EXERCISES: CPT

## 2022-07-15 PROCEDURE — 97016 VASOPNEUMATIC DEVICE THERAPY: CPT

## 2022-07-15 NOTE — PROGRESS NOTES
PHYSICAL THERAPY - DAILY TREATMENT NOTE    Patient Name: Nikolay Raya        Date: 7/15/2022  : 1990   yes Patient  Verified  Visit #:   7   of   36  Insurance: Payor: [address] / [facility] / Product Type: Managed Care Medicaid /      In time: 7:55 Out time: 8:59   Total Treatment Time: 64     Medicare/BCBS Time Tracking (below)   Total Timed Codes (min):  64 1:1 Treatment Time:  n/a     TREATMENT AREA =  Left knee pain [M25.562]    SUBJECTIVE  Pain Level (on 0 to 10 scale): 6 / 10   Medication Changes/New allergies or changes in medical history, any new surgeries or procedures?    no  If yes, update Summary List   Subjective Functional Status/Changes:  []  No changes reported     Pt reports feeling good after LV - took a nap and felt fine waking up. Sleeping with brace either on her back or side with pillow in between her knees. Woke up at 4 AM this morning with 10/10 pain all in the knee. Took tylenol and iced and helped a little bit. Generally wakes up with 5-6/10 pain but last night was the worst she's had since surgery       Blood Flow Restriction Procedure Note    Blood Flow Restriction Session Number:  3    If applicable, were there any adverse reactions to the last blood flow restriction therapy session? no      Chart reviewed for the following:  IHardik, have reviewed the medical history, summary list and precautions/contraindications for Nikolay Raya.     TIME OUT performed immediately prior to start of procedure:  8:01AM (enter time the timeout was completed)  Hardik GALLEGOS, have performed the following reviews on Nikolay Raya prior to the start of the session:        [x] Patient was identified by name and date of birth    [x] Purpose of blood flow restriction therapy, side effects, possible complications, risks and benefits were explained to the patient   [x] Procedure site(s) verified  [x] Informed Consent was signed (initial visit) and verified verbally (subsequent visits)  [x] Patient was instructed on the need to report any new medical conditions, procedures or medications.   [x] How to respond to possible adverse effects of treatment  [x] Opportunity was given to ask any questions, all questions were answered            Location(s) of blood flow restriction cuffs:   UE:   []  Right     []  Left             LE:    []  Right    [x]  Left           Pressure applied at the cuffs: 320 mm Hg (80% of 400 mg Hg)    Patients response to todays treatment:   [x]  General muscle soreness []  Numbness/tingling of extremity    []  Dizziness      []  Other: n/a     [x] Skin assessment post-treatment:    [x]  intact    []  redness- no adverse reaction     []  redness - adverse reaction:          OBJECTIVE  Modalities Rationale:     decrease edema and decrease pain to improve patient's ability to perform transfers   min [] Estim, type/location:                                      []  att     []  unatt     []  w/US     []  w/ice    []  w/heat    min []  Mechanical Traction: type/lbs                   []  pro   []  sup   []  int   []  cont    []  before manual    []  after manual    min []  Ultrasound, settings/location:      min []  Iontophoresis w/ dexamethasone, location:                                               []  take home patch       []  in clinic    min []  Ice     []  Heat    location/position:    10 min [x]  Vasopneumatic Device, press/temp: Mod, 36deg on L LE on wedge and HOB elevated post-session   If using vaso (only need to measure limb vaso being performed on)      pre-treatment girth : 50 cm, 46.5 cm, 42.5 cm      post-treatment girth :49.5 cm,  46.5 cm, 41.5 cm      measured at (landmark location) :  Suprapatellar, Mid patellar, infrapatellar     min []  Other:    [x] Skin assessment post-treatment (if applicable):    [x]  intact    []  redness- no adverse reaction                  []redness - adverse reaction:        20 min Therapeutic Exercise: [x]  See flow sheet   Rationale:      increase ROM, increase strength and improve coordination to improve the patients ability to recruit quad     16 min Therapeutic Activity: [x]  See flow sheet   Rationale:    increase ROM and improve coordination to improve the patients ability to safely negotiate curbs/stairs with AD within the community. 10 min Neuromuscular Re-ed: [x]  See flow sheet   Rationale:    increase strength, improve coordination, improve balance and increase proprioception to improve the patients ability to have appropriate gait with correct muscle activation. 8 min Gait Training:  Ambulated in parallel bars and clinic with 1 axillary crutch on R side. Cues for decrease step length and increase L LE stance time   Rationale: To improve ambulation safety and efficiency in order to improve patient's ability to safely ambulate at home for self care. Billed With/As:   [x] TE   [] TA   [] Neuro   [] Self Care Patient Education: [x] Review HEP    [] Progressed/Changed HEP based on:   [] positioning   [] body mechanics   [] transfers   [] heat/ice application    [] other:      Other Objective/Functional Measures:    Currently 3 weeks post op  *AAROM/PROM heel slides to improve L knee mobility. Able to achieve 90 deg flex without pain   *decrease repetitions, increase sets with SLR with QS to allow rest   *initially demo excessive L LE swinging and circumduction with ambulation with 1 axillary crutch     Post Treatment Pain Level (on 0 to 10) scale:   2 / 10     ASSESSMENT  Assessment/Changes in Function:     Good quad strength - able to perform 4x5 SLR without extensor lag. Cues for increase L LE stance time and decrease L LE swinging during ambulation, able to demo good carryover with cues. Pt noting improved confidence.  Advised pt to utilize bilateral axillary crutches with community ambulation for safety      []  See Progress Note/Recertification   Patient will continue to benefit from skilled PT services to modify and progress therapeutic interventions, address functional mobility deficits, analyze and cue movement patterns, and analyze and modify body mechanics/ergonomics to attain remaining goals. Progress toward goals / Updated goals: · Short Term Goals: To be accomplished in  6  weeks:  1. Independent with HEP to progress to meet goals. Met 07/14   2. Pt to report decreased max pain levels less than 6/10 for improvement in ADLs. 3. Pt to demonstrate appropriate gait without crutches and good quad control to improve community ambulation. Progressing 07/15 - no extensor lag with 4x5 SLR without brace and fair gait with 1 axillary crutch  · Long Term Goals: To be accomplished in  12  weeks:  1. Improve FOTO score to 45/100 to show a significant functional change. 2. Pt to report decreased max pain levels less than 3/10 for improvement in QoL. 3. Pt to demonstrate AROM of L knee to 0-125 for ease of functional activities.       PLAN  [x]  Upgrade activities as tolerated yes Continue plan of care   []  Discharge due to :    []  Other:      Therapist: BONY Kaur    Date: 7/15/2022 Time: 10:06 AM     Future Appointments   Date Time Provider Diana Keene   7/18/2022  3:00 PM Yeimi Mealing, PT [facility]   7/20/2022  8:00 AM Yeimi Mealing, PT [facility]   7/22/2022  8:00 AM [facility]   7/25/2022  9:00 AM Yeimi Mealing, PT [facility]   7/27/2022  1:15 PM [facility]   7/29/2022  9:00 AM Yeimi Mealing, PT [facility]   8/1/2022  9:00 AM Yeimi Mealing, PT [facility]   8/3/2022  8:00 AM Yeimi Mealing, PT [facility]   8/5/2022  8:00 AM Tayler Taylor [facility]   8/8/2022  9:00 AM Yeimi Mealing, PT [facility]   8/10/2022  8:00 AM Yeimi Mealing, PT [facility]   8/12/2022  8:00 AM Tayler Taylor [facility]   8/15/2022  9:00 AM Yeimi Mealing, PT [facility]   8/17/2022  8:00 AM Yeimi Mealing, PT [facility]   8/19/2022  8:00 AM Gen WOODWARD [facility]   8/22/2022  9:00 AM Martha Cazares,  [facility]   8/24/2022  8:00 AM Martha Cazares,  [facility]   8/26/2022  8:00 AM Suanne Canavan [facility]   8/29/2022  9:00 AM Martha Cazares,  [facility]   8/31/2022  8:00 AM Martha Cazares,  [facility]   9/2/2022  8:00 AM Ady [facility]

## 2022-07-18 ENCOUNTER — HOSPITAL ENCOUNTER (OUTPATIENT)
Dept: PHYSICAL THERAPY | Age: 32
Discharge: HOME | End: 2022-07-18
Payer: MEDICAID

## 2022-07-18 PROCEDURE — 97116 GAIT TRAINING THERAPY: CPT

## 2022-07-18 PROCEDURE — 97110 THERAPEUTIC EXERCISES: CPT

## 2022-07-18 PROCEDURE — 97530 THERAPEUTIC ACTIVITIES: CPT

## 2022-07-18 PROCEDURE — 97016 VASOPNEUMATIC DEVICE THERAPY: CPT

## 2022-07-18 NOTE — PROGRESS NOTES
PHYSICAL THERAPY - DAILY TREATMENT NOTE    Patient Name: Paul Robles        Date: 2022  : 1990   yes Patient  Verified  Visit #:   8      36  Insurance: Payor: [address] / [facility] [place] / Product Type: Managed Care Medicaid /      In time: 3:03 Out time: 3:52   Total Treatment Time: 49     TREATMENT AREA =  Left knee pain [M25.562]    SUBJECTIVE  Pain Level (on 0 to 10 scale): 6 / 10   Medication Changes/New allergies or changes in medical history, any new surgeries or procedures?    no  If yes, update Summary List   Subjective Functional Status/Changes:  []  No changes reported     Notes just taking tylenol for pain. Lots of swelling since last Friday.  Reports the pain in the R calf is better, but now she feels a lump in the proximal hamstring on the R.         OBJECTIVE  Modalities Rationale:     decrease edema and decrease pain to improve patient's ability to perform transfers   min [] Estim, type/location:                                      []  att     []  unatt     []  w/US     []  w/ice    []  w/heat    min []  Mechanical Traction: type/lbs                   []  pro   []  sup   []  int   []  cont    []  before manual    []  after manual    min []  Ultrasound, settings/location:      min []  Iontophoresis w/ dexamethasone, location:                                               []  take home patch       []  in clinic    min []  Ice     []  Heat    location/position:    10 min [x]  Vasopneumatic Device, press/temp: Mod, 36deg on L LE with legs elevated above head   If using vaso (only need to measure limb vaso being performed on)      pre-treatment girth : 52 cm      post-treatment girth :50.5 cm      measured at (landmark location) :  Suprapatellar    min []  Other:    [x] Skin assessment post-treatment (if applicable):    [x]  intact    []  redness- no adverse reaction                  []redness - adverse reaction:        15 min Therapeutic Exercise:  [x]  See flow sheet   Rationale:      increase ROM, increase strength and improve coordination to improve the patients ability to recruit quad     15 min Therapeutic Activity: [x]  See flow sheet   Rationale:    increase ROM and improve coordination to improve the patients ability to safely negotiate curbs/stairs with AD within the community. 9 min Gait Training:  Ambulated in parallel bars and clinic with 1 axillary crutch on R side. Pre gait over 2x4 in parallel bars with brace unlocked and no UE use   Rationale: To improve ambulation safety and efficiency in order to improve patient's ability to safely ambulate at home for self care. Billed With/As:   [x] TE   [] TA   [] Neuro   [] Self Care Patient Education: [x] Review HEP    [] Progressed/Changed HEP based on:   [] positioning   [] body mechanics   [] transfers   [] heat/ice application    [] other:      Other Objective/Functional Measures:    Currently 3 weeks post op  See flow sheet for exercises performed. Post Treatment Pain Level (on 0 to 10) scale:   4 / 10     ASSESSMENT  Assessment/Changes in Function:     Pt demo good quad strength and ROM. Trained in parallel bars for appropriate gait mechanics and improved trust in LLE. Held BFR this date due to increased swelling and varicose vein issues. Will continue to progress as tolerated. []  See Progress Note/Recertification   Patient will continue to benefit from skilled PT services to modify and progress therapeutic interventions, address functional mobility deficits, analyze and cue movement patterns, and analyze and modify body mechanics/ergonomics to attain remaining goals. Progress toward goals / Updated goals: · Short Term Goals: To be accomplished in  6  weeks:  1. Independent with HEP to progress to meet goals. Met 07/14   2. Pt to report decreased max pain levels less than 6/10 for improvement in ADLs. Progressing 7/18/22  3.  Pt to demonstrate appropriate gait without crutches and good quad control to improve community ambulation. Progressing 07/15 - no extensor lag with 4x5 SLR without brace and fair gait with 1 axillary crutch  · Long Term Goals: To be accomplished in  12  weeks:  1. Improve FOTO score to 45/100 to show a significant functional change. 2. Pt to report decreased max pain levels less than 3/10 for improvement in QoL. 3. Pt to demonstrate AROM of L knee to 0-125 for ease of functional activities.       PLAN  [x]  Upgrade activities as tolerated yes Continue plan of care   []  Discharge due to :    []  Other:      Therapist: Abdoul Stern PT , DPT    Date: 7/18/2022 Time: 4:06 PM     Future Appointments   Date Time Provider Diana Keene   7/18/2022  3:00 PM Kranthi Bridges PT [facility]   7/20/2022  8:00 AM Kranthi Bridges PT [facility]   7/22/2022  8:00 AM Negro Beaver [facility]   7/25/2022  9:00 AM Kranthi Bridges PT [facility]   7/27/2022  1:15 PM Ricky Damian [facility]   7/29/2022  9:00 AM Kranthi Bridges PT [facility]   8/1/2022  9:00 AM Kranthi Bridges PT [facility]   8/3/2022  8:00 AM Kranthi Bridges PT [facility]   8/5/2022  8:00 AM Negro Beaver [facility]   8/8/2022  9:00 AM Kranthi Bridges PT [facility]   8/10/2022  8:00 AM Kranthi Bridges PT [facility]   8/12/2022  8:00 AM Negro Beaver [facility]   8/15/2022  9:00 AM Kranthi Bridges, PT [facility]   8/17/2022  8:00 AM Kranthi Bridges, PT [facility]   8/19/2022  8:00 AM [facility]   8/22/2022  9:00 AM Kranthi Bridges, PT [facility]   8/24/2022  8:00 AM Kranthi Bridges PT [facility]   8/26/2022  8:00 AM Negro Beaver [facility]   8/29/2022  9:00 AM Kranthi Bridges PT [facility]   8/31/2022  8:00 AM Kranthi Bridges PT [facility]   9/2/2022  8:00 AM Ricky Damian [facility]

## 2022-07-20 ENCOUNTER — HOSPITAL ENCOUNTER (OUTPATIENT)
Dept: PHYSICAL THERAPY | Age: 32
Discharge: HOME | End: 2022-07-20
Payer: MEDICAID

## 2022-07-20 PROCEDURE — 97530 THERAPEUTIC ACTIVITIES: CPT

## 2022-07-20 PROCEDURE — 97110 THERAPEUTIC EXERCISES: CPT

## 2022-07-20 PROCEDURE — 97116 GAIT TRAINING THERAPY: CPT

## 2022-07-20 PROCEDURE — 97016 VASOPNEUMATIC DEVICE THERAPY: CPT

## 2022-07-20 NOTE — PROGRESS NOTES
PHYSICAL THERAPY - DAILY TREATMENT NOTE    Patient Name: Luisa Maldonado        Date: 2022  : 1990   yes Patient  Verified  Visit #:      36  Insurance: Payor: [address] / Plan: [facility] / Product Type: Managed Care Medicaid /      In time: 7:59 Out time: 8:51   Total Treatment Time: 52     TREATMENT AREA =  Left knee pain [M25.562]    SUBJECTIVE  Pain Level (on 0 to 10 scale): 6 / 10   Medication Changes/New allergies or changes in medical history, any new surgeries or procedures?    no  If yes, update Summary List   Subjective Functional Status/Changes:  []  No changes reported     Lots of pain last night when I was trying to sleep. Still swollen but maybe not as bad.          OBJECTIVE  Modalities Rationale:     decrease edema and decrease pain to improve patient's ability to perform transfers   min [] Estim, type/location:                                      []  att     []  unatt     []  w/US     []  w/ice    []  w/heat    min []  Mechanical Traction: type/lbs                   []  pro   []  sup   []  int   []  cont    []  before manual    []  after manual    min []  Ultrasound, settings/location:      min []  Iontophoresis w/ dexamethasone, location:                                               []  take home patch       []  in clinic    min []  Ice     []  Heat    location/position:    10 min [x]  Vasopneumatic Device, press/temp: Mod, 36deg on L LE with legs elevated above head   If using vaso (only need to measure limb vaso being performed on)      pre-treatment girth : 49 cm, 43.5 cm      post-treatment girth : 48.7cm, 43.5cm      measured at (landmark location) :  Superior patella, inferior patella    min []  Other:    [x] Skin assessment post-treatment (if applicable):    [x]  intact    []  redness- no adverse reaction                  []redness - adverse reaction:        14 min Therapeutic Exercise:  [x]  See flow sheet   Rationale:      increase ROM, increase strength and improve coordination to improve the patients ability to recruit quad     20 min Therapeutic Activity: [x]  See flow sheet   Rationale:    increase ROM and improve coordination to improve the patients ability to safely negotiate curbs/stairs with AD within the community. 8 min Gait Training:  Ambulated in parallel bars and clinic with 1 axillary crutch on R side. Pre gait over 2x4 in parallel bars with brace unlocked and no UE use   Rationale: To improve ambulation safety and efficiency in order to improve patient's ability to safely ambulate at home for self care. Billed With/As:   [x] TE   [] TA   [] Neuro   [] Self Care Patient Education: [x] Review HEP    [] Progressed/Changed HEP based on:   [] positioning   [] body mechanics   [] transfers   [] heat/ice application    [] other:      Other Objective/Functional Measures:    Currently 3 weeks post op  See flow sheet for exercises performed. VC with step ups for levels hips and leading with the knee. Post Treatment Pain Level (on 0 to 10) scale:   4 / 10     ASSESSMENT  Assessment/Changes in Function:     Pt able to perform 2x10 SLR without brace and no quad lag. Help BFR again due to increased swelling and pt symptoms with varicose veins. Challenged pt with 6in step up this date with brace donned but unlocked to 90. Also added leg press with no adverse reactions. []  See Progress Note/Recertification   Patient will continue to benefit from skilled PT services to modify and progress therapeutic interventions, address functional mobility deficits, analyze and cue movement patterns, and analyze and modify body mechanics/ergonomics to attain remaining goals. Progress toward goals / Updated goals:    Short Term Goals: To be accomplished in  6  weeks: Independent with HEP to progress to meet goals. Met 07/14   2. Pt to report decreased max pain levels less than 6/10 for improvement in ADLs. Progressing 7/18/22  3.  Pt to demonstrate appropriate gait without crutches and good quad control to improve community ambulation. Progressing 07/15 - no extensor lag with 4x5 SLR without brace and fair gait with 1 axillary crutch  Long Term Goals: To be accomplished in  12  weeks:  Improve FOTO score to 45/100 to show a significant functional change. 2. Pt to report decreased max pain levels less than 3/10 for improvement in QoL. 3. Pt to demonstrate AROM of L knee to 0-125 for ease of functional activities.       PLAN  [x]  Upgrade activities as tolerated yes Continue plan of care   []  Discharge due to :    []  Other:      Therapist: Jack Hernandez PT , DPT    Date: 7/20/2022 Time: 4:06 PM     Future Appointments   Date Time Provider Diana Keene   7/20/2022  8:00 AM Cornel El, [id number]   7/22/2022  8:00 AM Belinda Molina [facility]   7/25/2022  9:00 AM Cornel El, PT [facility]   7/27/2022  1:15 PM Tristian Mcgrath [facility]   7/29/2022  9:00 AM Cornel El, PT [facility]   8/1/2022  9:00 AM Cornel El, PT [facility]   8/3/2022  8:00 AM Cornel El, PT [facility]   8/5/2022  8:00 AM Belinda Molina [facility]   8/8/2022  9:00 AM Cornel El, PT [facility]   8/10/2022  8:00 AM Cornel El, PT [facility]   8/12/2022  8:00 AM Belinda Molina [facility]   8/15/2022  9:00 AM Cornel El, PT [facility]   8/17/2022  8:00 AM Cornel El, PT [facility]   8/19/2022  8:00 AM Belinda Molina [facility]   8/22/2022  9:00 AM Cornel El, PT [facility]   8/24/2022  8:00 AM Cornel El, PT [facility]   8/26/2022  8:00 AM Belinda Molina [facility]   8/29/2022  9:00 AM Cornel El, PT [facility]   8/31/2022  8:00 AM Cornel El, PT [facility]   9/2/2022  8:00 AM Tristian Mcgrath [facility]

## 2022-07-22 ENCOUNTER — HOSPITAL ENCOUNTER (OUTPATIENT)
Dept: PHYSICAL THERAPY | Age: 32
Discharge: HOME | End: 2022-07-22
Payer: MEDICAID

## 2022-07-22 PROCEDURE — 97535 SELF CARE MNGMENT TRAINING: CPT

## 2022-07-22 PROCEDURE — 97110 THERAPEUTIC EXERCISES: CPT

## 2022-07-22 PROCEDURE — 97530 THERAPEUTIC ACTIVITIES: CPT

## 2022-07-22 NOTE — PROGRESS NOTES
201 [facility] PHYSICAL THERAPY  [address] [place] [address] L[address]res [facility] 25 201,[facility], 70 [facility] - Phone: [phone]  Fax: [phone]  PROGRESS NOTE  Patient Name: Zach Segal : 1990   Treatment/Medical Diagnosis: Left knee pain [M25.562]   Referral Source: Frida Dawn,*     Date of Initial Visit: 22 Attended Visits: 10 Missed Visits: 0     SUMMARY OF TREATMENT  Patient has attended 10 PT sessions, including an initial evaluation for L knee pain (s/p L ACLR hamstring autograft 22). PT interventions have included manual therapy, therapeutic exercises, patient education, and HEP to improve L knee ROM, flexibility, strength, and stability. CP for pain control and decrease edema. CURRENT STATUS  Patient is currently 4 weeks post op. Patient has made steady progress thus far with PT interventions. Patient reports pain has ranged between 0-8/10 in the last week, noting elevated pain in the middle of the night, primarily in the L calf. Demo safe gait mechanics with brace unlocked and without bilateral axillary crutches for HHD. Advised pt to continue to utilize bilateral axillary crutches and brace in the community for safety. Patient continues to utilize ice and elevate every night, however continues to report difficulty sleeping due to donning brace. Current objective findings:   L knee AROM: 0 - 110 without pain   good quad set, 20 SLR with minimal muscle shaking  Gait analysis (without AD): decrease L LE stance time, minimal L heel whip,    Visual inspection: bruising in popliteal fossa with moderate swelling around the L knee and popliteal fossa   Manual observation: tenderness and palpable trigger points in medial gastroc    Goal/Measure of Progress Goal Met? 1. Independent with HEP to progress to meet goals. Status at last Eval: Goal established Current Status: I and compliant with basic HEP yes   2.   Pt to report decreased max pain levels less than 6/10 for improvement in ADLs. Status at last Eval: 10/10 Current Status: 8/10 progressing   3. Pt to demonstrate appropriate gait without crutches and good quad control to improve community ambulation. Status at last Eval: Ambulating with bilateral crutches  Current Status: At home: Able to ambulate without crutches and brace unlocked, good quad control     In the community: bilateral axillary crutches, brace locked yes     New Goals to be achieved in __8__  weeks:  1. Improve FOTO score to 45/100 to show a significant functional change. Status at last PN/RC: 1/100   2. Pt to report decreased max pain levels less than 6/10 for improvement in QoL. Status at last PN/RC: 9/10   3. Pt to demonstrate AROM of L knee to 0-125 for ease of functional activities. Status at last PN/RC: 0-110       RECOMMENDATIONS  Recommend 2-3x/week for 8 weeks to continue to improve L knee ROM, flexibility, strength, and stability to return to PLOF and complete functional mobility activities. Thank you for this referral.   If you have any questions/comments please contact us directly at [phone]. Thank you for allowing us to assist in the care of your patient. Therapist Signature: BONY Jackman, PT, DPT Date: 7/22/2022     Time: 5:04 PM   NOTE TO PHYSICIAN:  PLEASE COMPLETE THE ORDERS BELOW AND FAX TO   [facility]: [phone]  If you are unable to process this request in 24 hours please contact our office: [phone]    ___ I have read the above report and request that my patient continue as recommended.   ___ I have read the above report and request that my patient continue therapy with the following changes/special instructions:_________________________________________________________   ___ I have read the above report and request that my patient be discharged from therapy.      Physician Signature:        Date:       Time:                                 Davis Adithya Og

## 2022-07-22 NOTE — PROGRESS NOTES
PHYSICAL THERAPY - DAILY TREATMENT NOTE    Patient Name: Haylie Muñiz        Date: 2022  : 1990   yes Patient  Verified  Visit #:   10  of   36  Insurance: Payor: [address] / Plan: [facility] / Product Type: Managed Care Medicaid /      In time: 8:00 Out time: 8:58   Total Treatment Time: 58     Medicare/BCBS Time Tracking (below)   Total Timed Codes (min):  43 1:1 Treatment Time:  n/a     TREATMENT AREA =  Left knee pain [M25.562]    SUBJECTIVE  Pain Level (on 0 to 10 scale): 7 / 10 calf pain   Medication Changes/New allergies or changes in medical history, any new surgeries or procedures?    no  If yes, update Summary List   Subjective Functional Status/Changes:  []  No changes reported     Pt reports waking up in the middle of the night with L calf pain. \"Felt like everything dropped from the front of the thigh into the back of the leg. \"    SEE PN         OBJECTIVE  Modalities Rationale:     decrease edema and decrease pain to improve patient's ability to perform transfers   min [] Estim, type/location:                                      []  att     []  unatt     []  w/US     []  w/ice    []  w/heat    min []  Mechanical Traction: type/lbs                   []  pro   []  sup   []  int   []  cont    []  before manual    []  after manual    min []  Ultrasound, settings/location:      min []  Iontophoresis w/ dexamethasone, location:                                               []  take home patch       []  in clinic   10 min [x]  Ice     []  Heat    location/position: To L anterior and posterior knee in supine with LE on wedge post-session    min []  Vasopneumatic Device, press/temp:    If using vaso (only need to measure limb vaso being performed on)      pre-treatment girth :       post-treatment girth :      measured at (landmark location) :      min []  Other:    [x] Skin assessment post-treatment (if applicable):    [x]  intact    []  redness- no adverse reaction []redness - adverse reaction:        10 min Therapeutic Exercise:  [x]  See flow sheet   Rationale:      increase ROM, increase strength and improve coordination to improve the patients ability to recruit quad     20 min Therapeutic Activity: [x]  See flow sheet   Rationale:    increase ROM and improve coordination to improve the patients ability to safely negotiate curbs/stairs with AD within the community. 5 min Manual Therapy: DTM/TPR to L gastroc (NB)   Rationale:      decrease pain, increase ROM, increase tissue extensibility, and decrease trigger points to improve patient's ability to sleep through the night. The manual therapy interventions were performed at a separate and distinct time from the therapeutic activities interventions. 13 min Self Care: POC/biomechanical reassessment. Reviewed HEP. Reviewed current diagnosis, prognosis, and updated POC (patient goals, PT goals, and treatment). Rationale:  to improve understanding of current diagnosis with realistic expectation of PT to improve compliance/adherence and satisfaction. Billed With/As:   [x] TE   [] TA   [] Neuro   [] Self Care Patient Education: [x] Review HEP    [] Progressed/Changed HEP based on:   [] positioning   [] body mechanics   [] transfers   [] heat/ice application    [] other:      Other Objective/Functional Measures:    SEE PN     Post Treatment Pain Level (on 0 to 10) scale:   0 / 10     ASSESSMENT  Assessment/Changes in Function:     SEE PN     []  See Progress Note/Recertification   Patient will continue to benefit from skilled PT services to modify and progress therapeutic interventions, address functional mobility deficits, analyze and cue movement patterns, and analyze and modify body mechanics/ergonomics to attain remaining goals.    Progress toward goals / Updated goals:    SEE PN     PLAN  [x]  Upgrade activities as tolerated yes Continue plan of care   []  Discharge due to :    []  Other: Therapist: Dhara Odom    Date: 7/22/2022 Time: 4:35 PM     Future Appointments   Date Time Provider Diana Montanezi   7/25/2022  9:00 AM Wilmer Liss, PT [facility]   7/27/2022  1:15 PM [facility] Declan [facility]   7/29/2022  9:00 AM Wilmer Liss, PT [facility]   8/1/2022  9:00 AM Wilmer Liss, PT [facility]   8/3/2022  8:00 AM Wilmer Liss, PT [facility]   8/5/2022  8:00 AM [facility]   8/8/2022  9:00 AM Wilmer Liss, PT [facility]   8/10/2022  8:00 AM Wilmer Liss, PT [facility]   8/12/2022  8:00 AM [facility] Blush [facility]   8/15/2022  9:00 AM Wilmer Liss, PT [facility]   8/17/2022  8:00 AM Wilmer Liss, PT [facility]   8/19/2022  8:00 AM Lorna Blush [facility]   8/22/2022  9:00 AM Wilmer Liss, PT [facility]   8/24/2022  8:00 AM Wilmer Liss, PT [facility]   8/26/2022  8:00 AM L[facility] Blush [facility]   8/29/2022  9:00 AM Wilmer Liss, PT [facility]   8/31/2022  8:00 AM Jorje Leija PT [facility]   9/2/2022  8:00 AM Jan Staff [facility]

## 2022-07-25 ENCOUNTER — HOSPITAL ENCOUNTER (OUTPATIENT)
Dept: PHYSICAL THERAPY | Age: 32
Discharge: HOME | End: 2022-07-25
Payer: MEDICAID

## 2022-07-25 PROCEDURE — 97530 THERAPEUTIC ACTIVITIES: CPT

## 2022-07-25 PROCEDURE — 97110 THERAPEUTIC EXERCISES: CPT

## 2022-07-25 PROCEDURE — 97112 NEUROMUSCULAR REEDUCATION: CPT

## 2022-07-25 NOTE — PROGRESS NOTES
PHYSICAL THERAPY - DAILY TREATMENT NOTE    Patient Name: Kaylin Willis        Date: 2022  : 1990   yes Patient  Verified  Visit #:   6  of   36  Insurance: Payor: [address] / Plan: Abdoul Bautista / Product Type: Managed Care Medicaid /      In time: 8:59 Out time: 9:50   Total Treatment Time: 51     Medicare/BCBS Time Tracking (below)   Total Timed Codes (min):  41 1:1 Treatment Time:  n/a     TREATMENT AREA =  Left knee pain [M25.562]    SUBJECTIVE  Pain Level (on 0 to 10 scale): 4 / 10    Medication Changes/New allergies or changes in medical history, any new surgeries or procedures?    no  If yes, update Summary List   Subjective Functional Status/Changes:  []  No changes reported     Notes less pain, just stiffness. Had a busy day on Saturday and noted the knee was swelling at the end of the day.  took it easy and knee is just stiff today.          OBJECTIVE  Modalities Rationale:     decrease edema and decrease pain to improve patient's ability to perform transfers   min [] Estim, type/location:                                      []  att     []  unatt     []  w/US     []  w/ice    []  w/heat    min []  Mechanical Traction: type/lbs                   []  pro   []  sup   []  int   []  cont    []  before manual    []  after manual    min []  Ultrasound, settings/location:      min []  Iontophoresis w/ dexamethasone, location:                                               []  take home patch       []  in clinic   10 min [x]  Ice     []  Heat    location/position: To L anterior and posterior knee in supine with LE on wedge post-session    min []  Vasopneumatic Device, press/temp:    If using vaso (only need to measure limb vaso being performed on)      pre-treatment girth :       post-treatment girth :      measured at (landmark location) :      min []  Other:    [x] Skin assessment post-treatment (if applicable):    [x]  intact    []  redness- no adverse reaction []redness - adverse reaction:        10 min Therapeutic Exercise:  [x]  See flow sheet   Rationale:      increase ROM, increase strength and improve coordination to improve the patients ability to recruit quad     20 min Therapeutic Activity: [x]  See flow sheet   Rationale:    increase ROM and improve coordination to improve the patients ability to safely negotiate curbs/stairs with AD within the community. 11 min Neuromuscular Re-ed: [x]  See flow sheet   Rationale:    improve coordination, improve balance, and increase proprioception to improve the patients ability to improve stance phase. Billed With/As:   [x] TE   [] TA   [] Neuro   [] Self Care Patient Education: [x] Review HEP    [] Progressed/Changed HEP based on:   [] positioning   [] body mechanics   [] transfers   [] heat/ice application    [] other:      Other Objective/Functional Measures:  See flow sheet for exercises performed. Pt 4 weeks post op L ACLR with hamstring autograft. Post Treatment Pain Level (on 0 to 10) scale:   0 / 10     ASSESSMENT  Assessment/Changes in Function:     Continued with POC. Added Bike to initiate session and SLS for improved stance phase of gait. Required TC for knee varus with SL Leg press this date. Will progress as tolerated and drop to 2x/week after MD follow up.     []  See Progress Note/Recertification   Patient will continue to benefit from skilled PT services to modify and progress therapeutic interventions, address functional mobility deficits, analyze and cue movement patterns, and analyze and modify body mechanics/ergonomics to attain remaining goals. Progress toward goals / Updated goals:    1. Improve FOTO score to 45/100 to show a significant functional change. Status at last PN/RC: 1/100   2. Pt to report decreased max pain levels less than 6/10 for improvement in QoL. Status at last PN/RC: 9/10   3. Pt to demonstrate AROM of L knee to 0-125 for ease of functional activities. Status at last PN/RC: 0-110        PLAN  [x]  Upgrade activities as tolerated yes Continue plan of care   []  Discharge due to :    []  Other:      Therapist: Ernie Christine, PT , DPT    Date: 7/25/2022 Time: 4:35 PM     Future Appointments   Date Time Provider Diana Yecenia   7/25/2022  9:00 AM Charlotta , PT [id number]   7/27/2022  1:15 PM Jerman Shannon [facility]   7/29/2022  9:00 AM Charlotta ,  [address] [facility]   8/1/2022  9:00 AM Charlotta ,  [address] [facility]   8/3/2022  8:00 AM Charlotta ,  [address] [facility]   8/5/2022  8:00 AM Jerman Shannon [facility]   8/8/2022  9:00 AM Charlotta ,  [address] [facility]   8/10/2022  8:00 AM Charlotta ,  [address] [facility]   8/12/2022  8:00 AM Jerman Shannon [facility]   8/15/2022  9:00 AM Charlotta ,  [address] [facility]   8/17/2022  8:00 AM Charlotta ,  [address] [facility]   8/19/2022  8:00 AM Jerman hSannon [facility]   8/22/2022  9:00 AM Charlotta ,  [address] [facility]   8/24/2022  8:00 AM Charlotta ,  [address] [facility]   8/26/2022  8:00 AM Jerman Shannon [facility]   8/29/2022  9:00 AM Bob Skinner,  [facility]   8/31/2022  8:00 AM Bob Skinner,  [facility]   9/2/2022  8:00 AM Colin Serna [address] [facility]

## 2022-07-27 ENCOUNTER — HOSPITAL ENCOUNTER (OUTPATIENT)
Dept: PHYSICAL THERAPY | Age: 32
Discharge: HOME | End: 2022-07-27
Payer: MEDICAID

## 2022-07-27 PROCEDURE — 97112 NEUROMUSCULAR REEDUCATION: CPT

## 2022-07-27 PROCEDURE — 97110 THERAPEUTIC EXERCISES: CPT

## 2022-07-27 PROCEDURE — 97530 THERAPEUTIC ACTIVITIES: CPT

## 2022-07-27 NOTE — PROGRESS NOTES
PHYSICAL THERAPY - DAILY TREATMENT NOTE    Patient Name: Luisa Maldonado        Date: 2022  : 1990   yes Patient  Verified  Visit #:   12     36  Insurance: Payor: Darleen Alvarado / Plan: [address] [facility] / Product Type: Managed Care Medicaid /      In time: 1:10 Out time: 2:11   Total Treatment Time: 61     Medicare/BCBS Time Tracking (below)   Total Timed Codes (min):  51 1:1 Treatment Time:  n/a     TREATMENT AREA =  Left knee pain [M25.562]    SUBJECTIVE  Pain Level (on 0 to 10 scale): 2 10    Medication Changes/New allergies or changes in medical history, any new surgeries or procedures?    no  If yes, update Summary List   Subjective Functional Status/Changes:  []  No changes reported     Patient reports typical painful stiffness in the AM, but doesn't feel as bad as last week. Saw PCP and per pt, MD  sent out a referral to a vascular specialist for bruising around her varicose veins.           OBJECTIVE  Modalities Rationale:     decrease edema and decrease pain to improve patient's ability to perform transfers   min [] Estim, type/location:                                      []  att     []  unatt     []  w/US     []  w/ice    []  w/heat    min []  Mechanical Traction: type/lbs                   []  pro   []  sup   []  int   []  cont    []  before manual    []  after manual    min []  Ultrasound, settings/location:      min []  Iontophoresis w/ dexamethasone, location:                                               []  take home patch       []  in clinic   10 min [x]  Ice     []  Heat    location/position: To L anterior and posterior knee in supine with LE on wedge post-session    min []  Vasopneumatic Device, press/temp:    If using vaso (only need to measure limb vaso being performed on)      pre-treatment girth :       post-treatment girth :      measured at (landmark location) :      min []  Other:    [x] Skin assessment post-treatment (if applicable):    [x]  intact    [] redness- no adverse reaction                  []redness - adverse reaction:        20 min Therapeutic Exercise:  [x]  See flow sheet   Rationale:      increase ROM, increase strength and improve coordination to improve the patients ability to recruit quad     20 min Therapeutic Activity: [x]  See flow sheet   Rationale:    increase ROM and improve coordination to improve the patients ability to safely negotiate curbs/stairs with AD within the community. 11 min Neuromuscular Re-ed: [x]  See flow sheet   Rationale:    improve coordination, improve balance, and increase proprioception to improve the patients ability to improve stance phase. Billed With/As:   [x] TE   [] TA   [] Neuro   [] Self Care Patient Education: [x] Review HEP    [] Progressed/Changed HEP based on:   [] positioning   [] body mechanics   [] transfers   [] heat/ice application    [] other:      Other Objective/Functional Measures:    Access Code: [email or web address]  URL: [email or web address]  Date: 07/27/2022  Prepared by: Malena Vail    Program Notes  RETURN TO GYM EXERCISES!!!! 3x/WEEK, 1x/DAY (except for the bike). PERFORM ALL EXERCISES TO YOUR TOLERANCE. IF SHARP PAIN OR RED FLAGS OCCUR, IMMEDIATELY STOP EXERCISE. Exercises  10 MINUTE WARM UP - Upright Bike - 2-3 x daily - 3 x weekly  Standing Heel Raises - 1 x daily - 3 x weekly - 2 sets - 10 reps  Step Up - 1 x daily - 3 x weekly - 2 sets - 10 reps  Standing Partial Lunge - 1 x daily - 3 x weekly - 2 sets - 10 reps  Sit to Stand - 1 x daily - 3 x weekly - 2 sets - 12 reps    Patient currently 4 weeks post op. 5 weeks on Friday (07/29)   Continued exercises per flowsheet. Initiated mini lunges in parallel bars. Post Treatment Pain Level (on 0 to 10) scale:   1 / 10     ASSESSMENT  Assessment/Changes in Function:     Added mini lunges in parallel bars with visual cues (mirror) to prevent excessive anterior shift.  Issued and reviewed light gym HEP to continue to improve L knee strength/stability. Educated pt on performing 1x/day 3x/week, however can ride the bike 2-3x/day to continue with ROM. Pt verbalized understanding. []  See Progress Note/Recertification   Patient will continue to benefit from skilled PT services to modify and progress therapeutic interventions, address functional mobility deficits, analyze and cue movement patterns, and analyze and modify body mechanics/ergonomics to attain remaining goals. Progress toward goals / Updated goals:    1. Improve FOTO score to 45/100 to show a significant functional change. Status at last PN/RC: 1/100   2. Pt to report decreased max pain levels less than 6/10 for improvement in QoL. Status at last PN/RC: 9/10  Progressing 07/27/22 - \"feels better this week, compared to last week, doesn't feel as bad\"    3. Pt to demonstrate AROM of L knee to 0-125 for ease of functional activities.    Status at last PN/RC: 0-110        PLAN  [x]  Upgrade activities as tolerated yes Continue plan of care   []  Discharge due to :    []  Other:      Therapist: BONY Neal    Date: 7/27/2022 Time: 4:31 PM     Future Appointments   Date Time Provider Diana Keene   7/29/2022  9:00 AM Aryan Medrano, PT [address]   8/1/2022  9:00 AM Aryan Medrano PT [id number]   8/3/2022  8:00 AM Aryan Medrano,  [address] [facility]   8/5/2022  8:00 AM Abhishek Keith [facility]   8/8/2022  9:00 AM Aryan Medrano,  [address] [facility]   8/10/2022  8:00 AM Aryan Medrano,  [address] [facility]   8/12/2022  8:00 AM Abhishek Keith [facility]   8/15/2022  9:00 AM Aryan Medrano,  [address] [facility]   8/17/2022  8:00 AM Aryan Medrano  [facility]   8/19/2022  8:00 AM Abhishek Keith [facility]   8/22/2022  9:00 AM Aryan Medrano,  [facility]   8/24/2022  8:00 AM Aryan Medrano,  [facility]   8/26/2022  8:00 AM Abhishek Keith [facility]   8/29/2022  9:00 AM Aryan Medrano,  [facility]   8/31/2022  8:00 AM Aryan Medrano,  [facility]   9/2/2022  8:00 AM Krys Callaway [address] [facility]

## 2022-07-29 ENCOUNTER — HOSPITAL ENCOUNTER (OUTPATIENT)
Dept: PHYSICAL THERAPY | Age: 32
Discharge: HOME | End: 2022-07-29
Payer: MEDICAID

## 2022-07-29 PROCEDURE — 97112 NEUROMUSCULAR REEDUCATION: CPT

## 2022-07-29 PROCEDURE — 97530 THERAPEUTIC ACTIVITIES: CPT

## 2022-07-29 PROCEDURE — 97110 THERAPEUTIC EXERCISES: CPT

## 2022-07-29 NOTE — PROGRESS NOTES
PHYSICAL THERAPY - DAILY TREATMENT NOTE    Patient Name: Juan Flores        Date: 2022  : 1990   yes Patient  Verified  Visit #:   15  of   39  Insurance: Payor: [address] / Plan: [facility] / Product Type: Managed Care Medicaid /      In time: 9:00 Out time: 9:58   Total Treatment Time: 58     TREATMENT AREA =  Left knee pain [M25.562]    SUBJECTIVE  Pain Level (on 0 to 10 scale): 0/ 10    Medication Changes/New allergies or changes in medical history, any new surgeries or procedures?    no  If yes, update Summary List   Subjective Functional Status/Changes:  []  No changes reported     Notes no pain this morning. Slept a little better last night but had some pains on the inside of the knee occasionally. No word on vascular appointment.          OBJECTIVE  Modalities Rationale:     decrease edema and decrease pain to improve patient's ability to perform transfers   min [] Estim, type/location:                                      []  att     []  unatt     []  w/US     []  w/ice    []  w/heat    min []  Mechanical Traction: type/lbs                   []  pro   []  sup   []  int   []  cont    []  before manual    []  after manual    min []  Ultrasound, settings/location:      min []  Iontophoresis w/ dexamethasone, location:                                               []  take home patch       []  in clinic   10 min [x]  Ice     []  Heat    location/position: To L anterior and posterior knee in supine with LE on wedge post-session    min []  Vasopneumatic Device, press/temp:    If using vaso (only need to measure limb vaso being performed on)      pre-treatment girth :       post-treatment girth :      measured at (landmark location) :      min []  Other:    [x] Skin assessment post-treatment (if applicable):    [x]  intact    []  redness- no adverse reaction                  []redness - adverse reaction:        20 min Therapeutic Exercise:  [x]  See flow sheet   Rationale: increase ROM, increase strength and improve coordination to improve the patients ability to recruit quad     20 min Therapeutic Activity: [x]  See flow sheet   Rationale:    increase ROM and improve coordination to improve the patients ability to safely negotiate curbs/stairs with AD within the community. 8 min Neuromuscular Re-ed: [x]  See flow sheet   Rationale:    improve coordination, improve balance, and increase proprioception to improve the patients ability to improve stance phase. Billed With/As:   [x] TE   [] TA   [] Neuro   [] Self Care Patient Education: [x] Review HEP    [] Progressed/Changed HEP based on:   [] positioning   [] body mechanics   [] transfers   [] heat/ice application    [] other:      Other Objective/Functional Measures:    Patient currently 5 weeks  Continued exercises per flowsheet. No quad lag with SLR - added 1# this date. Post Treatment Pain Level (on 0 to 10) scale:   0 / 10     ASSESSMENT  Assessment/Changes in Function:     Continued with POC. Added wall sit this date requiring VC for equal weight shifts and ball toss to improve proprioception. []  See Progress Note/Recertification   Patient will continue to benefit from skilled PT services to modify and progress therapeutic interventions, address functional mobility deficits, analyze and cue movement patterns, and analyze and modify body mechanics/ergonomics to attain remaining goals. Progress toward goals / Updated goals:    1. Improve FOTO score to 45/100 to show a significant functional change. Status at last PN/RC: 1/100   2. Pt to report decreased max pain levels less than 6/10 for improvement in QoL. Status at last PN/RC: 9/10  Progressing 07/29/22 - no pain this date   3. Pt to demonstrate AROM of L knee to 0-125 for ease of functional activities.    Status at last PN/RC: 0-110        PLAN  [x]  Upgrade activities as tolerated yes Continue plan of care   []  Discharge due to :    []  Other: Therapist: Mignon Acosta PT, DPT    Date: 7/29/2022 Time: 4:31 PM     Future Appointments   Date Time Provider Diana Keene   7/29/2022  9:00 AM Donna Mildred, PT [id number]   8/1/2022  9:00 AM Donna Mildred, PT [address]   8/3/2022  8:00 AM Donna Mlidred, PT [facility]   8/5/2022  8:00 AM Hilary [facility]   8/8/2022  9:00 AM Donna Mildred, PT [facility]   8/10/2022  8:00 AM Donna Mildred, PT [facility]   8/12/2022  8:00 AM Jose[facility]ade Martinez [facility]   8/15/2022  9:00 AM Donna Mildred, PT [facility]   8/17/2022  8:00 AM Donna Mildred, PT [facility]   8/19/2022  8:00 AM Josekade Martinez [facility]   8/22/2022  9:00 AM Donna Mildred, PT [facility]   8/24/2022  8:00 AM Donna Mildred, PT [facility]   8/26/2022  8:00 AM Josekade Martinez [facility]   8/29/2022  9:00 AM Donna Mildred, PT [facility]   8/31/2022  8:00 AM Donna Mildred, PT [facility]   9/2/2022  8:00 AM Hilary [facility]

## 2022-08-01 ENCOUNTER — HOSPITAL ENCOUNTER (OUTPATIENT)
Dept: PHYSICAL THERAPY | Age: 32
Discharge: HOME | End: 2022-08-01
Payer: MEDICAID

## 2022-08-01 PROCEDURE — 97110 THERAPEUTIC EXERCISES: CPT

## 2022-08-01 PROCEDURE — 97530 THERAPEUTIC ACTIVITIES: CPT

## 2022-08-01 PROCEDURE — 97112 NEUROMUSCULAR REEDUCATION: CPT

## 2022-08-01 NOTE — PROGRESS NOTES
PHYSICAL THERAPY - DAILY TREATMENT NOTE    Patient Name: Paul Robles        Date: 2022  : 1990   yes Patient  Verified  Visit #:   15  of   36  Insurance: Payor: [address] / Plan: 231 [facility] / Product Type: Managed Care Medicaid /      In time: 9:00 Out time: 9:51   Total Treatment Time: 51     TREATMENT AREA =  Left knee pain [M25.562]    SUBJECTIVE  Pain Level (on 0 to 10 scale): 2/ 10    Medication Changes/New allergies or changes in medical history, any new surgeries or procedures?    no  If yes, update Summary List   Subjective Functional Status/Changes:  []  No changes reported     Notes she had a busy weekend. In a little bit of pain this morning but maybe more stiffness.          OBJECTIVE  Modalities Rationale:     decrease edema and decrease pain to improve patient's ability to perform transfers   min [] Estim, type/location:                                      []  att     []  unatt     []  w/US     []  w/ice    []  w/heat    min []  Mechanical Traction: type/lbs                   []  pro   []  sup   []  int   []  cont    []  before manual    []  after manual    min []  Ultrasound, settings/location:      min []  Iontophoresis w/ dexamethasone, location:                                               []  take home patch       []  in clinic   10 min [x]  Ice     []  Heat    location/position: To L anterior and posterior knee in supine with LE on wedge post-session    min []  Vasopneumatic Device, press/temp:    If using vaso (only need to measure limb vaso being performed on)      pre-treatment girth :       post-treatment girth :      measured at (landmark location) :      min []  Other:    [x] Skin assessment post-treatment (if applicable):    [x]  intact    []  redness- no adverse reaction                  []redness - adverse reaction:        15 min Therapeutic Exercise:  [x]  See flow sheet   Rationale:      increase ROM, increase strength and improve coordination to improve the patients ability to recruit quad     18 min Therapeutic Activity: [x]  See flow sheet   Rationale:    increase ROM and improve coordination to improve the patients ability to safely negotiate curbs/stairs with AD within the community. 8 min Neuromuscular Re-ed: [x]  See flow sheet   Rationale:    improve coordination, improve balance, and increase proprioception to improve the patients ability to improve stance phase. Billed With/As:   [x] TE   [] TA   [] Neuro   [] Self Care Patient Education: [x] Review HEP    [] Progressed/Changed HEP based on:   [] positioning   [] body mechanics   [] transfers   [] heat/ice application    [] other:      Other Objective/Functional Measures:    Patient currently 5 weeks  Continued exercises per flowsheet. Post Treatment Pain Level (on 0 to 10) scale:   0 / 10     ASSESSMENT  Assessment/Changes in Function:     See PN     [x]  See Progress Note/Recertification   Patient will continue to benefit from skilled PT services to modify and progress therapeutic interventions, address functional mobility deficits, analyze and cue movement patterns, and analyze and modify body mechanics/ergonomics to attain remaining goals.    Progress toward goals / Updated goals:    See PN       PLAN  [x]  Upgrade activities as tolerated yes Continue plan of care   []  Discharge due to :    []  Other:      Therapist: Clark Luis PT, DPT    Date: 8/1/2022 Time: 4:31 PM     Future Appointments   Date Time Provider Diana Keene   8/1/2022  9:00 AM TRAY [address]   8/3/2022  8:00 AM Jesisca Newsome PT [facility]   8/5/2022  8:00 AM Mar [facility]   8/8/2022  9:00 AM Jessica Newsome PT [facility]   8/10/2022  8:00 AM Jessica Newsome PT [facility]   8/12/2022  8:00 AM Becky Chambers [facility]   8/15/2022  9:00 AM Jessica Newsome PT [facility]   8/17/2022  8:00 AM Jessica Newsome PT [facility]   8/19/2022  8:00 AM Becky Chambers [facility]   8/22/2022  9:00 AM Megan Hughes Hiu Matos [facility]   8/24/2022  8:00 AM Feliberto Killer, PT [facility]   8/26/2022  8:00 AM Mary Calle [facility]   8/29/2022  9:00 AM Feliberto Roque, PT [facility]   8/31/2022  8:00 AM Feliberto Roque, PT [facility]   9/2/2022  8:00 AM Gumaro Alonso [facility]

## 2022-08-01 NOTE — PROGRESS NOTES
[facility] PHYSICAL THERAPY  [address] [place] [address] [address] [facility] [address],[facility], [facility] - Phone: [phone]  Fax: [phone]  PROGRESS NOTE  Patient Name: Sugey Lopez : 1990   Treatment/Medical Diagnosis: Left knee pain [M25.562]   Referral Source: Jeannegene Celine,*     Date of Initial Visit: 22 Attended Visits: 14 Missed Visits: 0     SUMMARY OF TREATMENT  Patient has attended 14 PT sessions, including an initial evaluation for L knee pain (s/p L ACLR hamstring autograft 22). PT interventions have included manual therapy, therapeutic exercises, patient education, and HEP to improve L knee ROM, flexibility, strength, and stability. CP for pain control and decrease edema. CURRENT STATUS  Patient is currently 5 weeks post op. Patient has made good progress since beginning PT. Notes max pain level at 4/10 after being on her feet all day, but average pain level of 2/10. Notes she is not taking any pain medication and is sleeping better through the night. She has good gait mechanics and good quad strength; She is not using crutches during session and brace in unlocked. ROM is 0-122. She would continue to benefit form skilled PT to address strength deficits and continue with post op ACL protocol. Goal/Measure of Progress Goal Met? 1. Improve FOTO score to 45/100 to show a significant functional change. Status at last Eval:  Current Status: Will take next PN NA   2. Pt to report decreased max pain levels less than 6/10 for improvement in ADLs. Status at last Eval: 8/10 Current Status: 4/10 yes   3. Pt to demonstrate AROM of L knee to 0-125 for ease of functional activities. Status at last Eval: Goal established Current Status: 0-125 yes     New Goals to be achieved in __8__  weeks:  Improve FOTO score to 45/100 to show a significant functional change. 2. Pt to report decreased max pain levels less than 3/10 for improvement in QoL.   3. Pt to demonstrate 8inch step up with no muscle shaking to improve stair negotiation. RECOMMENDATIONS  Recommend 2x/week for 8 weeks to continue to improve L knee strength, and stability to return to PLOF and complete functional mobility activities. Thank you for this referral.   If you have any questions/comments please contact us directly at [phone]. Thank you for allowing us to assist in the care of your patient. Therapist Signature: Elsa Tinajero PT, DPT Date: 8/1/2022     Time: 5:04 PM   NOTE TO PHYSICIAN:  PLEASE COMPLETE THE ORDERS BELOW AND FAX TO   [facility]: [phone]  If you are unable to process this request in 24 hours please contact our office: [phone]    ___ I have read the above report and request that my patient continue as recommended.   ___ I have read the above report and request that my patient continue therapy with the following changes/special instructions:_________________________________________________________   ___ I have read the above report and request that my patient be discharged from therapy.      Physician Signature:        Date:       Time:                                 Alec Parnell

## 2022-08-03 ENCOUNTER — HOSPITAL ENCOUNTER (OUTPATIENT)
Dept: PHYSICAL THERAPY | Age: 32
Discharge: HOME | End: 2022-08-03
Payer: MEDICAID

## 2022-08-03 PROCEDURE — 97110 THERAPEUTIC EXERCISES: CPT

## 2022-08-03 PROCEDURE — 97530 THERAPEUTIC ACTIVITIES: CPT

## 2022-08-03 PROCEDURE — 97112 NEUROMUSCULAR REEDUCATION: CPT

## 2022-08-03 NOTE — PROGRESS NOTES
PHYSICAL THERAPY - DAILY TREATMENT NOTE    Patient Name: Cecily Thompson        Date: 8/3/2022  : 1990   yes Patient  Verified  Visit #:   13     39  Insurance: Payor: [place] / Plan: 1500 / Product Type: Medicaid /      In time: 8:00 Out time: 8:43   Total Treatment Time: 43     TREATMENT AREA =  Left knee pain [M25.562]    SUBJECTIVE  Pain Level (on 0 to 10 scale): 0/ 10    Medication Changes/New allergies or changes in medical history, any new surgeries or procedures?    no  If yes, update Summary List   Subjective Functional Status/Changes:  []  No changes reported     Reports MD apt went well. Pt reports it is up to PT to discharge brace and crutches. Notes ROM looked good and knee was healing well.          OBJECTIVE  Modalities Rationale:     decrease edema and decrease pain to improve patient's ability to perform transfers   min [] Estim, type/location:                                      []  att     []  unatt     []  w/US     []  w/ice    []  w/heat    min []  Mechanical Traction: type/lbs                   []  pro   []  sup   []  int   []  cont    []  before manual    []  after manual    min []  Ultrasound, settings/location:      min []  Iontophoresis w/ dexamethasone, location:                                               []  take home patch       []  in clinic   PD min [x]  Ice     []  Heat    location/position: To L anterior and posterior knee in supine with LE on wedge post-session    min []  Vasopneumatic Device, press/temp:    If using vaso (only need to measure limb vaso being performed on)      pre-treatment girth :       post-treatment girth :      measured at (landmark location) :      min []  Other:    [x] Skin assessment post-treatment (if applicable):    [x]  intact    []  redness- no adverse reaction                  []redness - adverse reaction:        13 min Therapeutic Exercise:  [x]  See flow sheet   Rationale:      increase ROM, increase strength and improve coordination to improve the patients ability to recruit quad     20 min Therapeutic Activity: [x]  See flow sheet   Rationale:    increase ROM and improve coordination to improve the patients ability to safely negotiate curbs/stairs with AD within the community. 10 min Neuromuscular Re-ed: [x]  See flow sheet   Rationale:    improve coordination, improve balance, and increase proprioception to improve the patients ability to improve stance phase. Billed With/As:   [x] TE   [] TA   [] Neuro   [] Self Care Patient Education: [x] Review HEP    [] Progressed/Changed HEP based on:   [] positioning   [] body mechanics   [] transfers   [] heat/ice application    [] other:      Other Objective/Functional Measures:    Patient currently 5 weeks  Continued exercises per flowsheet. Post Treatment Pain Level (on 0 to 10) scale:   0 / 10     ASSESSMENT  Assessment/Changes in Function:     Continues to required visual cues for equal weight with squats and lunges. Consistent verbal cues given to encourage weight shift. Performed sit to stands in front of mirror and with 4inch step under R LE. Instructed pt to DC brace and crutches to improve normal gait mechanics. []  See Progress Note/Recertification   Patient will continue to benefit from skilled PT services to modify and progress therapeutic interventions, address functional mobility deficits, analyze and cue movement patterns, and analyze and modify body mechanics/ergonomics to attain remaining goals. Progress toward goals / Updated goals:    Improve FOTO score to 45/100 to show a significant functional change. 2. Pt to report decreased max pain levels less than 3/10 for improvement in QoL. Progressing well 8/3/22  3. Pt to demonstrate 8inch step up with no muscle shaking to improve stair negotiation.           PLAN  [x]  Upgrade activities as tolerated yes Continue plan of care   []  Discharge due to :    []  Other:      Therapist: Rayn Mccarthy PT, DPT    Date: 8/3/2022 Time: 4:31 PM     Future Appointments   Date Time Provider Diana Yecenia   8/3/2022  8:00 AM Jazmine Albert PT [facility]   8/8/2022  9:00 AM Jazmine Albert PT [facility]   8/12/2022  8:00 AM Tony [facility]   8/15/2022  9:00 AM Jazmine Albert PT [facility]   8/19/2022  8:00 AM Donis Aragon [facility]   8/22/2022  9:00 AM Jazmine Albert PT [facility]   8/26/2022  8:00 AM Tony [facility]   8/29/2022  9:00 AM Jazmine Albert PT [facility]   9/2/2022  8:00 AM [facility]

## 2022-08-05 ENCOUNTER — APPOINTMENT (OUTPATIENT)
Dept: PHYSICAL THERAPY | Age: 32
End: 2022-08-05
Payer: MEDICAID

## 2022-08-08 ENCOUNTER — HOSPITAL ENCOUNTER (OUTPATIENT)
Dept: PHYSICAL THERAPY | Age: 32
Discharge: HOME | End: 2022-08-08
Payer: MEDICAID

## 2022-08-08 PROCEDURE — 97530 THERAPEUTIC ACTIVITIES: CPT

## 2022-08-08 PROCEDURE — 97110 THERAPEUTIC EXERCISES: CPT

## 2022-08-08 PROCEDURE — 97112 NEUROMUSCULAR REEDUCATION: CPT

## 2022-08-08 NOTE — PROGRESS NOTES
PHYSICAL THERAPY - DAILY TREATMENT NOTE    Patient Name: Cory Pass        Date: 2022  : 1990   yes Patient  Verified  Visit #:     Insurance: Payor: Gabi Frazier / Plan: [facility] / Product Type: Managed Care Medicaid /      In time: 9:00 Out time: 9:37   Total Treatment Time: 37     TREATMENT AREA =  Left knee pain [M25.562]    SUBJECTIVE  Pain Level (on 0 to 10 scale): 2/ 10    Medication Changes/New allergies or changes in medical history, any new surgeries or procedures?    no  If yes, update Summary List   Subjective Functional Status/Changes:  []  No changes reported     Notes she has a busy weekend. Knee feels stiff this morning.        OBJECTIVE  Modalities Rationale:     decrease edema and decrease pain to improve patient's ability to perform transfers   min [] Estim, type/location:                                      []  att     []  unatt     []  w/US     []  w/ice    []  w/heat    min []  Mechanical Traction: type/lbs                   []  pro   []  sup   []  int   []  cont    []  before manual    []  after manual    min []  Ultrasound, settings/location:      min []  Iontophoresis w/ dexamethasone, location:                                               []  take home patch       []  in clinic   PD min [x]  Ice     []  Heat    location/position: To L anterior and posterior knee in supine with LE on wedge post-session    min []  Vasopneumatic Device, press/temp:    If using vaso (only need to measure limb vaso being performed on)      pre-treatment girth :       post-treatment girth :      measured at (landmark location) :      min []  Other:    [x] Skin assessment post-treatment (if applicable):    [x]  intact    []  redness- no adverse reaction                  []redness - adverse reaction:        8 min Therapeutic Exercise:  [x]  See flow sheet   Rationale:      increase ROM, increase strength and improve coordination to improve the patients ability to recruit quad     20 min Therapeutic Activity: [x]  See flow sheet   Rationale:    increase ROM and improve coordination to improve the patients ability to safely negotiate curbs/stairs with AD within the community. 9 min Neuromuscular Re-ed: [x]  See flow sheet   Rationale:    improve coordination, improve balance, and increase proprioception to improve the patients ability to improve stance phase. Billed With/As:   [x] TE   [] TA   [] Neuro   [] Self Care Patient Education: [x] Review HEP    [] Progressed/Changed HEP based on:   [] positioning   [] body mechanics   [] transfers   [] heat/ice application    [] other:      Other Objective/Functional Measures:    Patient currently 6 weeks  Continued exercises per flowsheet. Post Treatment Pain Level (on 0 to 10) scale:   0 / 10     ASSESSMENT  Assessment/Changes in Function:     No complaints with session. Good tolerance to step ups and increased weight with leg press. Will continue to progress as tolerated. []  See Progress Note/Recertification   Patient will continue to benefit from skilled PT services to modify and progress therapeutic interventions, address functional mobility deficits, analyze and cue movement patterns, and analyze and modify body mechanics/ergonomics to attain remaining goals. Progress toward goals / Updated goals:    Improve FOTO score to 45/100 to show a significant functional change. 2. Pt to report decreased max pain levels less than 3/10 for improvement in QoL. Progressing well 8/3/22  3. Pt to demonstrate 8inch step up with no muscle shaking to improve stair negotiation.  Progressing well 8/8/22        PLAN  [x]  Upgrade activities as tolerated yes Continue plan of care   []  Discharge due to :    []  Other:      Therapist: Dilan Haines, PT, DPT    Date: 8/8/2022 Time: 4:31 PM     Future Appointments   Date Time Provider Diana Keene   8/8/2022  9:00 AM Jessi Pruett, PT [id number]   8/12/2022  8:00 AM Shirley Ross Codie Jaffe [facility]   8/15/2022  9:00 AM Yeimi Mealing, PT [facility]   8/19/2022  8:00 AM Tayler Taylor [facility]   8/22/2022  9:00 AM Yeimi Mealing, PT [facility]   8/26/2022  8:00 [facility]   8/29/2022  9:00 AM Yeimi Mealing, PT [facility]   9/2/2022  8:00 AM [facility]

## 2022-08-10 ENCOUNTER — APPOINTMENT (OUTPATIENT)
Dept: PHYSICAL THERAPY | Age: 32
End: 2022-08-10
Payer: MEDICAID

## 2022-08-12 ENCOUNTER — HOSPITAL ENCOUNTER (OUTPATIENT)
Dept: PHYSICAL THERAPY | Age: 32
Discharge: HOME | End: 2022-08-12
Payer: MEDICAID

## 2022-08-12 PROCEDURE — 97112 NEUROMUSCULAR REEDUCATION: CPT

## 2022-08-12 PROCEDURE — 97530 THERAPEUTIC ACTIVITIES: CPT

## 2022-08-12 PROCEDURE — 97110 THERAPEUTIC EXERCISES: CPT

## 2022-08-12 PROCEDURE — 97535 SELF CARE MNGMENT TRAINING: CPT

## 2022-08-12 NOTE — PROGRESS NOTES
PHYSICAL THERAPY - DAILY TREATMENT NOTE    Patient Name: Basil Sosa        Date: 2022  : 1990   yes Patient  Verified  Visit #:   16  of   36  Insurance: Payor: [address] / Plan: Aniyah Gutierrez / Product Type: Managed Care Medicaid /      In time: 7:58 Out time: 9:00   Total Treatment Time: 62     Medicare/BCBS Time Tracking (below)   Total Timed Codes (min):  62 1:1 Treatment Time:  n/a     TREATMENT AREA =  Left knee pain [M25.562]    SUBJECTIVE  Pain Level (on 0 to 10 scale): 2/ 10    Medication Changes/New allergies or changes in medical history, any new surgeries or procedures?    no  If yes, update Summary List   Subjective Functional Status/Changes:  []  No changes reported     Patient reports no significant pain, but will have soreness and swelling at the end of a long of household activities. Functional limitations: home stair negotiation, limited to doing 1 heavy household activity (ie: cleaning bathrooms), prolonged walking and standing       OBJECTIVE    15 min Therapeutic Exercise:  [x]  See flow sheet   Rationale:      increase ROM, increase strength and improve coordination to improve the patients ability to recruit quad     20 min Therapeutic Activity: [x]  See flow sheet   Rationale:    increase ROM and improve coordination to improve the patients ability to safely negotiate curbs/stairs with AD within the community. 15 min Neuromuscular Re-ed: [x]  See flow sheet   Rationale:    improve coordination, improve balance, and increase proprioception to improve the patients ability to improve stance phase. 12 min Self Care: Reviewed HEP and activity pacing with walking activities. Reviewed current diagnosis, prognosis, and POC. Rationale:  to improve understanding of current diagnosis with realistic expectation of PT to improve compliance/adherence and satisfaction.     Billed With/As:   [x] TE   [] TA   [] Neuro   [] Self Care Patient Education: [x] Review HEP    [] Progressed/Changed HEP based on:   [] positioning   [] body mechanics   [] transfers   [] heat/ice application    [] other:      Other Objective/Functional Measures:    *performed static lunges with 6 inch step for correct form      Post Treatment Pain Level (on 0 to 10) scale:   0/ 10     ASSESSMENT  Assessment/Changes in Function:     Discussed 1x/week until week 10 due to post surgical protocol. Patient demo improved form with visual and tactile cues for static lunges   Patient demo increase muscle shaking with last repetitions of wall sits and last repetition of single leg LP.     []  See Progress Note/Recertification   Patient will continue to benefit from skilled PT services to modify and progress therapeutic interventions, address functional mobility deficits, analyze and cue movement patterns, and analyze and modify body mechanics/ergonomics to attain remaining goals. Progress toward goals / Updated goals:    New Goals to be achieved in __8__  weeks:  Improve FOTO score to 45/100 to show a significant functional change. 2. Pt to report decreased max pain levels less than 3/10 for improvement in QoL. 3. Pt to demonstrate 8inch step up with no muscle shaking to improve stair negotiation.  Progressing 08/12 - performing 8 inch step ups with and without weight with minimal muscle shaking           PLAN  [x]  Upgrade activities as tolerated yes Continue plan of care   []  Discharge due to :    []  Other:      Therapist: BONY Shine    Date: 8/12/2022 Time: 10:35 AM     Future Appointments   Date Time Provider Diana Keene   8/15/2022  9:00 AM Jessica Newsome, PT [id number]   8/19/2022  8:00 AM Mar Nicole [facility]   8/22/2022  9:00 AM Jessica Newsome,  [facility]   8/26/2022  8:00 AM Becky Chambers [facility]   8/29/2022  9:00 AM Jessica Newsome,  [address] [facility]   9/2/2022 10:15 AM Dewayne Schneider

## 2022-08-15 ENCOUNTER — HOSPITAL ENCOUNTER (OUTPATIENT)
Dept: PHYSICAL THERAPY | Age: 32
Discharge: HOME | End: 2022-08-15
Payer: MEDICAID

## 2022-08-15 PROCEDURE — 97110 THERAPEUTIC EXERCISES: CPT

## 2022-08-15 PROCEDURE — 97112 NEUROMUSCULAR REEDUCATION: CPT

## 2022-08-15 PROCEDURE — 97530 THERAPEUTIC ACTIVITIES: CPT

## 2022-08-15 NOTE — PROGRESS NOTES
PHYSICAL THERAPY - DAILY TREATMENT NOTE    Patient Name: Basil Sosa        Date: 8/15/2022  : 1990   yes Patient  Verified  Visit #:   25  of   39  Insurance: Payor: [address] / Plan: [facility] / Product Type: Managed Care Medicaid /      In time: 8:58 Out time: 9:35   Total Treatment Time: 37     Medicare/BCBS Time Tracking (below)   Total Timed Codes (min):  na 1:1 Treatment Time:  n/a     TREATMENT AREA =  Left knee pain [M25.562]    SUBJECTIVE  Pain Level (on 0 to 10 scale): 2 10    Medication Changes/New allergies or changes in medical history, any new surgeries or procedures?    no  If yes, update Summary List   Subjective Functional Status/Changes:  []  No changes reported     Notes she walked around [facility] for 10 min with a break f/b 5 min walk. No pain but had to take breaks due to swelling and fatigue. OBJECTIVE  10 min Therapeutic Exercise:  [x]  See flow sheet   Rationale:      increase ROM, increase strength and improve coordination to improve the patients ability to recruit quad     17 min Therapeutic Activity: [x]  See flow sheet   Rationale:    increase ROM and improve coordination to improve the patients ability to safely negotiate curbs/stairs with AD within the community. 10 min Neuromuscular Re-ed: [x]  See flow sheet   Rationale:    improve coordination, improve balance, and increase proprioception to improve the patients ability to improve stance phase. Billed With/As:   [x] TE   [] TA   [] Neuro   [] Self Care Patient Education: [x] Review HEP    [] Progressed/Changed HEP based on:   [] positioning   [] body mechanics   [] transfers   [] heat/ice application    [] other:      Other Objective/Functional Measures:    See flow sheet for exercises performed.      Post Treatment Pain Level (on 0 to 10) scale:   0/ 10     ASSESSMENT  Assessment/Changes in Function:     Added closed chain hamstring work this date with no complaints of discomfort. Increased tolerance for leg press; performed wall sits and leg press at two different parts of session to reduce fatigue. []  See Progress Note/Recertification   Patient will continue to benefit from skilled PT services to modify and progress therapeutic interventions, address functional mobility deficits, analyze and cue movement patterns, and analyze and modify body mechanics/ergonomics to attain remaining goals. Progress toward goals / Updated goals:    New Goals to be achieved in __8__  weeks:  Improve FOTO score to 45/100 to show a significant functional change. 2. Pt to report decreased max pain levels less than 3/10 for improvement in QoL. 3. Pt to demonstrate 8inch step up with no muscle shaking to improve stair negotiation.  Progressing 08/12 - performing 8 inch step ups with weight with minimal muscle shaking           PLAN  [x]  Upgrade activities as tolerated yes Continue plan of care   []  Discharge due to :    []  Other:      Therapist: Elsa Tinajero, PT, DPT    Date: 8/15/2022 Time: 10:35 AM     Future Appointments   Date Time Provider Diana Keene   8/15/2022  9:00 AM Que Horvath,  [address] [facility]   8/22/2022  9:00 AM Que Horvath,  [address] [facility]   8/26/2022  8:00 AM Tracee Lundy [facility]   8/31/2022  8:00 AM Que Horvath,  [address] [facility]   9/6/2022 11:00 AM Tracee Lundy [facility]   9/9/2022  8:45 AM Steve Johnson [address] [facility]   9/13/2022  8:15 AM Steve Johnson [address] [facility]   9/16/2022  8:45 AM Steve Johnson [address] [facility]

## 2022-08-17 ENCOUNTER — APPOINTMENT (OUTPATIENT)
Dept: PHYSICAL THERAPY | Age: 32
End: 2022-08-17
Payer: MEDICAID

## 2022-08-19 ENCOUNTER — APPOINTMENT (OUTPATIENT)
Dept: PHYSICAL THERAPY | Age: 32
End: 2022-08-19
Payer: MEDICAID

## 2022-08-22 ENCOUNTER — HOSPITAL ENCOUNTER (OUTPATIENT)
Dept: PHYSICAL THERAPY | Age: 32
End: 2022-08-22
Payer: MEDICAID

## 2022-08-24 ENCOUNTER — APPOINTMENT (OUTPATIENT)
Dept: PHYSICAL THERAPY | Age: 32
End: 2022-08-24
Payer: MEDICAID

## 2022-08-26 ENCOUNTER — HOSPITAL ENCOUNTER (OUTPATIENT)
Dept: PHYSICAL THERAPY | Age: 32
Discharge: HOME | End: 2022-08-26
Payer: MEDICAID

## 2022-08-26 PROCEDURE — 97110 THERAPEUTIC EXERCISES: CPT

## 2022-08-26 PROCEDURE — 97530 THERAPEUTIC ACTIVITIES: CPT

## 2022-08-26 PROCEDURE — 97112 NEUROMUSCULAR REEDUCATION: CPT

## 2022-08-26 NOTE — PROGRESS NOTES
PHYSICAL THERAPY - DAILY TREATMENT NOTE    Patient Name: Kaylin Willis        Date: 2022  : 1990   yes Patient  Verified  Visit #:   25  of   39  Ins[facility]: [facility] [address] [facility] / Plan: [facility] / Product Type: Managed Care Medicaid /      In time: 7:58 Out time: 8:43   Total Treatment Time: 45     Medicare/BCBS Time Tracking (below)   Total Timed Codes (min):  na 1:1 Treatment Time:  n/a     TREATMENT AREA =  Left knee pain [M25.562]    SUBJECTIVE  Pain Level (on 0 to 10 scale): 2-3 10    Medication Changes/New allergies or changes in medical history, any new surgeries or procedures?    no  If yes, update Summary List   Subjective Functional Status/Changes:  []  No changes reported     Reports tripping over a rake the other day, but not falling. Reports prior to trippping, she was cleaning her house without any rest breaks to increase endurance. States she feels more sore and has been using ice. Reports walking around [facility] - about a 40 minute walk. OBJECTIVE  10 min Therapeutic Exercise:  [x]  See flow sheet   Rationale:      increase ROM, increase strength and improve coordination to improve the patients ability to recruit quad     20 min Therapeutic Activity: [x]  See flow sheet   Rationale:    increase ROM and improve coordination to improve the patients ability to safely negotiate curbs/stairs with AD within the community. 15 min Neuromuscular Re-ed: [x]  See flow sheet   Rationale:    improve coordination, improve balance, and increase proprioception to improve the patients ability to improve stance phase. Billed With/As:   [x] TE   [] TA   [] Neuro   [] Self Care Patient Education: [x] Review HEP    [] Progressed/Changed HEP based on:   [] positioning   [] body mechanics   [] transfers   [] heat/ice application    [] other:      Other Objective/Functional Measures:    Patient is 8 weeks post op.   *progressed exercises by increasing repetitions and resistance (see flowsheet for loads, drills & volumes)  *performed static lunges with 6 inch step and Aire[employer], challenged however no pain     Post Treatment Pain Level (on 0 to 10) scale:   0/ 10     ASSESSMENT  Assessment/Changes in Function:     Patient required minimal cues for RDLs. Continued to perform wall sits and leg press at different parts of the session to reduce fatigue, continues to respond well with separation of exercises. []  See Progress Note/Recertification   Patient will continue to benefit from skilled PT services to modify and progress therapeutic interventions, address functional mobility deficits, analyze and cue movement patterns, and analyze and modify body mechanics/ergonomics to attain remaining goals. Progress toward goals / Updated goals:    New Goals to be achieved in __8__  weeks:  Improve FOTO score to 45/100 to show a significant functional change. 2. Pt to report decreased max pain levels less than 3/10 for improvement in QoL. 3. Pt to demonstrate 8inch step up with no muscle shaking to improve stair negotiation.  Progressing 08/26 - able to perform 10 repetitions of 8 inch step ups with no muscle shaking, minimal muscle shaking at 2nd set          PLAN  [x]  Upgrade activities as tolerated yes Continue plan of care   []  Discharge due to :    []  Other:      Therapist: Herber Epperson    Date: 8/26/2022 Time: 12:40 PM     Future Appointments   Date Time Provider Diana Keene   8/26/2022  8:00 AM Niesha Ortiz [facility]   8/31/2022  8:00 AM Ruthy Resendez, PT [facility]   9/6/2022 11:00 AM Matt Perez   9/9/2022  8:45 AM Graciela [facility]   9/13/2022  8:15 AM [facility]   9/16/2022  8:45 AM Mtat Perez

## 2022-08-29 ENCOUNTER — APPOINTMENT (OUTPATIENT)
Dept: PHYSICAL THERAPY | Age: 32
End: 2022-08-29
Payer: MEDICAID

## 2022-08-31 ENCOUNTER — APPOINTMENT (OUTPATIENT)
Dept: PHYSICAL THERAPY | Age: 32
End: 2022-08-31
Payer: MEDICAID

## 2022-08-31 ENCOUNTER — HOSPITAL ENCOUNTER (OUTPATIENT)
Dept: PHYSICAL THERAPY | Age: 32
Discharge: HOME | End: 2022-08-31
Payer: MEDICAID

## 2022-08-31 PROCEDURE — 97530 THERAPEUTIC ACTIVITIES: CPT

## 2022-08-31 PROCEDURE — 97112 NEUROMUSCULAR REEDUCATION: CPT

## 2022-08-31 PROCEDURE — 97110 THERAPEUTIC EXERCISES: CPT

## 2022-08-31 NOTE — PROGRESS NOTES
[facility] PHYSICAL THERAPY  [address] [place] [address] Zakia Linares [facility] [address],[facility], [facility] - Phone: [phone]  Fax: [phone]  PROGRESS NOTE  Patient Name: Cynthia Jimenez : 1990   Treatment/Medical Diagnosis: Left knee pain [M25.562]   Referral Source: Michelle Almonte,*     Date of Initial Visit: 22 Attended Visits: 20 Missed Visits: 1     SUMMARY OF TREATMENT  Patient has attended 20 PT sessions, including an initial evaluation for L knee pain (s/p L ACLR hamstring autograft 22). PT interventions have included manual therapy, therapeutic exercises, patient education, and HEP to improve L knee ROM strength, and stability. CP for pain control and decrease edema. CURRENT STATUS  Patient is currently 9 weeks post op. Patient has made steady progress since beginning PT. Notes max pain level at 7/10 after cleaning the house all day, but average pain level of 2/10. She reports walking around [facility] every other day for about 45min, notes stairs are a lot easier, and she can perform all household chores without pain. She does report instances of her knee locking up in the morning when she gets out of bed and times when she feels like her knee isn't extending all the way with stair ascent, however she continues to have swelling around the L knee. ROM is now 3deg of hyperextension - 120. She would continue to benefit form skilled PT to address strength deficits and continue with post op ACL protocol. Goal/Measure of Progress Goal Met? 1. Improve FOTO score to 45/100 to show a significant functional change. Status at last Eval:  Current Status: 56/100 yes   2. Pt to report decreased max pain levels less than 6/10 for improvement in ADLs. Status at last Eval: 4/10 Current Status: 7/10 no   3. Pt to demonstrate 8inch step up with no muscle shaking to improve stair negotiation.    Status at last Eval: Goal established Current Status:  yes New Goals to be achieved in __8__  weeks:  Improve FOTO score to 45/100 to show a significant functional change. 2. Pt to report decreased max pain levels less than 3/10 for improvement in QoL. 3. Pt to demonstrate Quad index of 70% to progress ACL protocol. RECOMMENDATIONS  Recommend 1-2x/week for 8 weeks to continue to improve L knee strength, and stability to return to PLOF and complete functional mobility activities. Thank you for this referral.   If you have any questions/comments please contact us directly at [phone]. Thank you for allowing us to assist in the care of your patient. Therapist Signature: Yunier Solares, PT, DPT Date: 8/31/2022     Time: 5:04 PM   NOTE TO PHYSICIAN:  PLEASE COMPLETE THE ORDERS BELOW AND FAX TO   [facility]: [phone]  If you are unable to process this request in 24 hours please contact our office: [phone]    ___ I have read the above report and request that my patient continue as recommended.   ___ I have read the above report and request that my patient continue therapy with the following changes/special instructions:_________________________________________________________   ___ I have read the above report and request that my patient be discharged from therapy.      Physician Signature:        Date:       Time:                                 Gabby Smith

## 2022-08-31 NOTE — PROGRESS NOTES
PHYSICAL THERAPY - DAILY TREATMENT NOTE    Patient Name: Desean Reynolds        Date: 2022  : 1990   yes Patient  Verified  Visit #:      36  Insurance: Payor: [address] / Plan: [facility] / Product Type: Managed Care Medicaid /      In time: 8:00 Out time: 8:43   Total Treatment Time: 43     Medicare/BCBS Time Tracking (below)   Total Timed Codes (min):  na 1:1 Treatment Time:  n/a     TREATMENT AREA =  Left knee pain [M25.562]    SUBJECTIVE  Pain Level (on 0 to 10 scale): 2-3 10    Medication Changes/New allergies or changes in medical history, any new surgeries or procedures?    no  If yes, update Summary List   Subjective Functional Status/Changes:  []  No changes reported     See PN       OBJECTIVE  10 min Therapeutic Exercise:  [x]  See flow sheet   Rationale:      increase ROM, increase strength and improve coordination to improve the patients ability to recruit quad     20 min Therapeutic Activity: [x]  See flow sheet   Rationale:    increase ROM and improve coordination to improve the patients ability to safely negotiate curbs/stairs with AD within the community. 13 min Neuromuscular Re-ed: [x]  See flow sheet   Rationale:    improve coordination, improve balance, and increase proprioception to improve the patients ability to improve stance phase. Billed With/As:   [x] TE   [] TA   [] Neuro   [] Self Care Patient Education: [x] Review HEP    [] Progressed/Changed HEP based on:   [] positioning   [] body mechanics   [] transfers   [] heat/ice application    [] other:      Other Objective/Functional Measures:    Patient is 9 weeks post op.   *progressed exercises by increasing repetitions and resistance (see flowsheet for loads, drills & volumes)  See PN     Post Treatment Pain Level (on 0 to 10) scale:   0/ 10     ASSESSMENT  Assessment/Changes in Function:     See PN     [x]  See Progress Note/Recertification   Patient will continue to benefit from skilled PT services to modify and progress therapeutic interventions, address functional mobility deficits, analyze and cue movement patterns, and analyze and modify body mechanics/ergonomics to attain remaining goals.    Progress toward goals / Updated goals:    See PN          PLAN  [x]  Upgrade activities as tolerated yes Continue plan of care   []  Discharge due to :    []  Other:      Therapist: Germania Wyman, PT, DPT    Date: 8/31/2022 Time: 12:40 PM     Future Appointments   Date Time Provider Diana Keene   8/31/2022  8:00 AM Adalgisa Aponte, PT [facility]   9/6/2022 11:00 AM Lilli Hernandez [facility]   9/9/2022  8:45 AM [facility]   9/13[facility]2022  [facility]   9/16/2022  8:45 AM [facility]

## 2022-09-02 ENCOUNTER — APPOINTMENT (OUTPATIENT)
Dept: PHYSICAL THERAPY | Age: 32
End: 2022-09-02
Payer: MEDICAID

## 2022-09-06 ENCOUNTER — HOSPITAL ENCOUNTER (OUTPATIENT)
Dept: PHYSICAL THERAPY | Age: 32
Discharge: HOME | End: 2022-09-06
Payer: MEDICAID

## 2022-09-06 PROCEDURE — 97530 THERAPEUTIC ACTIVITIES: CPT

## 2022-09-06 PROCEDURE — 97110 THERAPEUTIC EXERCISES: CPT

## 2022-09-06 PROCEDURE — 97112 NEUROMUSCULAR REEDUCATION: CPT

## 2022-09-06 NOTE — PROGRESS NOTES
PHYSICAL THERAPY - DAILY TREATMENT NOTE    Patient Name: Tatyana Wills        Date: 2022  : 1990   yes Patient  Verified  Visit #:   21  of   36  Insurance: Payor: [address] / Plan: [facility] / Product Type: Managed Care Medicaid /      In time: 10:55 Out time: 11:42   Total Treatment Time: 47     Medicare/BCBS Time Tracking (below)   Total Timed Codes (min):  na 1:1 Treatment Time:  n/a     TREATMENT AREA =  Left knee pain [M25.562]    SUBJECTIVE  Pain Level (on 0 to 10 scale): 2/ 10    Medication Changes/New allergies or changes in medical history, any new surgeries or procedures?    no  If yes, update Summary List   Subjective Functional Status/Changes:  []  No changes reported     Patient reports MD added another 6 weeks out from work because she had complaints of knee buckling and fatigue when reaching the top step. Anticipated RTW 10/14/22, return to MD for assessment on 10/04/22. OBJECTIVE    Blood Flow Restriction Procedure Note    Blood Flow Restriction Session Number:  1    If applicable, were there any adverse reactions to the last blood flow restriction therapy session? no      Chart reviewed for the following:  IRigoberto, have reviewed the medical history, summary list and precautions/contraindications for Tatyana Wills.     TIME OUT performed immediately prior to start of procedure:  11:15 (enter time the timeout was completed)  Rigoberto GALLEGOS, have performed the following reviews on Tatyana Wills prior to the start of the session:        [x] Patient was identified by name and date of birth    [x] Purpose of blood flow restriction therapy, side effects, possible complications, risks and benefits were explained to the patient   [x] Procedure site(s) verified  [x] Informed Consent was signed (initial visit) and verified verbally (subsequent visits)  [x] Patient was instructed on the need to report any new medical conditions, procedures or medications. [x] How to respond to possible adverse effects of treatment  [x] Opportunity was given to ask any questions, all questions were answered            Location(s) of blood flow restriction cuffs:   UE:   []  Right     [x]  Left             LE:    []  Right    []  Left           Pressure applied at the cuffs: 380 mm Hg (80% of 475 mmHg 100% occlusion)    Patients response to todays treatment:   [x]  General muscle soreness []  Numbness/tingling of extremity    []  Dizziness      []  Other:      [x] Skin assessment post-treatment:    [x]  intact    []  redness- no adverse reaction     []  redness - adverse reaction:        10 min Therapeutic Exercise:  [x]  See flow sheet   Rationale:      increase ROM, increase strength and improve coordination to improve the patients ability to recruit quad     20 min Therapeutic Activity: [x]  See flow sheet   Rationale:    increase ROM and improve coordination to improve the patients ability to safely negotiate curbs/stairs with AD within the community. 17 min Neuromuscular Re-ed: [x]  See flow sheet   Rationale:    improve coordination, improve balance, and increase proprioception to improve the patients ability to improve stance phase. Billed With/As:   [x] TE   [] TA   [] Neuro   [] Self Care Patient Education: [x] Review HEP    [] Progressed/Changed HEP based on:   [] positioning   [] body mechanics   [] transfers   [] heat/ice application    [] other:      Other Objective/Functional Measures:    Patient is 10 weeks post op  *initiated [place] hamstring exercises per MD protocol   *performed BFR  with quadriceps strengthening following warm up on elliptical and dynamic exercises. Pt fatigued with BFR exercises, able to fully complete     Post Treatment Pain Level (on 0 to 10) scale:   2/ 10     ASSESSMENT  Assessment/Changes in Function:     Patient noted no change in pain level post session. Will continue to progress exercises per pt's tolerance and POC. [x]  See Progress Note/Recertification   Patient will continue to benefit from skilled PT services to modify and progress therapeutic interventions, address functional mobility deficits, analyze and cue movement patterns, and analyze and modify body mechanics/ergonomics to attain remaining goals. Progress toward goals / Updated goals:    New Goals to be achieved in __8__  weeks:  Improve FOTO score to 45/100 to show a significant functional change. 2. Pt to report decreased max pain levels less than 3/10 for improvement in QoL. 3. Pt to demonstrate Quad index of 70% to progress ACL protocol.  Progressing 09/06 indicated by focus on BFR exercises           PLAN  [x]  Upgrade activities as tolerated yes Continue plan of care   []  Discharge due to :    []  Other:      Therapist: Edyta Moreno    Date: 9/6/2022 Time: 6:18 PM     Future Appointments   Date Time Provider Diana Keene   9/6/2022 11:00 AM Mar Nicole [facility]   9/9/2022  8:45 AM [facility]   9/13/2022  8:15 AM Beckynabil [id number]   9/16/2022  8:45 AM Becky Trish [facility]

## 2022-09-09 ENCOUNTER — HOSPITAL ENCOUNTER (OUTPATIENT)
Dept: PHYSICAL THERAPY | Age: 32
Discharge: HOME | End: 2022-09-09
Payer: MEDICAID

## 2022-09-09 PROCEDURE — 97530 THERAPEUTIC ACTIVITIES: CPT

## 2022-09-09 PROCEDURE — 97112 NEUROMUSCULAR REEDUCATION: CPT

## 2022-09-09 PROCEDURE — 97110 THERAPEUTIC EXERCISES: CPT

## 2022-09-09 NOTE — PROGRESS NOTES
PHYSICAL THERAPY - DAILY TREATMENT NOTE    Patient Name: Chino Do        Date: 2022  : 1990   yes Patient  Verified  Visit #:   25  of   36  Insurance: Payor: [address] / [facility] [place] / Product Type: Managed Care Medicaid /      In time: 8:45 Out time: 9:40   Total Treatment Time: 55     Medicare/BCBS Time Tracking (below)   Total Timed Codes (min):  na 1:1 Treatment Time:  n/a     TREATMENT AREA =  Left knee pain [M25.562]    SUBJECTIVE  Pain Level (on 0 to 10 scale): 2/ 10    Medication Changes/New allergies or changes in medical history, any new surgeries or procedures?    no  If yes, update Summary List   Subjective Functional Status/Changes:  []  No changes reported     Patient reports decrease swelling in the AM but notices more stiffness. Also reports getting clicking and popping in the knee during stair negotiation, occasionally painful       OBJECTIVE    Blood Flow Restriction Procedure Note    Blood Flow Restriction Session Number:  2    If applicable, were there any adverse reactions to the last blood flow restriction therapy session? no      Chart reviewed for the following:  IJerrell, have reviewed the medical history, summary list and precautions/contraindications for Chino Do. TIME OUT performed immediately prior to start of procedure:  9:30 (enter time the timeout was completed)  Jerrell GALLEGOS, have performed the following reviews on Chino Do prior to the start of the session:        [x] Patient was identified by name and date of birth    [x] Purpose of blood flow restriction therapy, side effects, possible complications, risks and benefits were explained to the patient   [x] Procedure site(s) verified  [x] Informed Consent was signed (initial visit) and verified verbally (subsequent visits)  [x] Patient was instructed on the need to report any new medical conditions, procedures or medications.   [x] How to respond to possible adverse effects of treatment  [x] Opportunity was given to ask any questions, all questions were answered            Location(s) of blood flow restriction cuffs:   UE:   []  Right     [x]  Left             LE:    []  Right    []  Left           Pressure applied at the cuffs: 360 mm Hg (80% of 460 mmHg 100% occlusion)    Patients response to todays treatment:   [x]  General muscle soreness []  Numbness/tingling of extremity    []  Dizziness      []  Other:      [x] Skin assessment post-treatment:    [x]  intact    []  redness- no adverse reaction     []  redness - adverse reaction:        20 min Therapeutic Exercise:  [x]  See flow sheet   Rationale:      increase ROM, increase strength and improve coordination to improve the patients ability to recruit quad     20 min Therapeutic Activity: [x]  See flow sheet   Rationale:    increase ROM and improve coordination to improve the patients ability to safely negotiate curbs/stairs with AD within the community. 15 min Neuromuscular Re-ed: [x]  See flow sheet   Rationale:    improve coordination, improve balance, and increase proprioception to improve the patients ability to improve stance phase. Billed With/As:   [x] TE   [] TA   [] Neuro   [] Self Care Patient Education: [x] Review HEP    [] Progressed/Changed HEP based on:   [] positioning   [] body mechanics   [] transfers   [] heat/ice application    [] other:      Other Objective/Functional Measures:    Patient is 11 weeks post op  *initiated PT session with warm up on elliptical followed by dynamic resistance exercises then BFR quadriceps exercises. Concluded session with step ups with weight and lateral tap downs without BFR     Post Treatment Pain Level (on 0 to 10) scale:   2/ 10     ASSESSMENT  Assessment/Changes in Function:     Patient noted no pain following PT session indicating good response to today's PT interventions.    Patient demo good carryover with neutral knee alignment during 4 inch lateral tap downs with visual cues and low repetitions. [x]  See Progress Note/Recertification   Patient will continue to benefit from skilled PT services to modify and progress therapeutic interventions, address functional mobility deficits, analyze and cue movement patterns, and analyze and modify body mechanics/ergonomics to attain remaining goals. Progress toward goals / Updated goals:    New Goals to be achieved in __8__  weeks:  Improve FOTO score to 45/100 to show a significant functional change. 2. Pt to report decreased max pain levels less than 3/10 for improvement in QoL. 3. Pt to demonstrate Quad index of 70% to progress ACL protocol.  Progressing 09/06 indicated by focus on BFR exercises           PLAN  [x]  Upgrade activities as tolerated yes Continue plan of care   []  Discharge due to :    []  Other:      Therapist: Mikael Barahona    Date: 9/9/2022 Time: 10:35 AM     Future Appointments   Date Time Provider Diana Keene   9/13/2022  8:15 AM Rollen Saranya [facility]   9/16/2022  8:45 AM Rollen Saranya [facility]   9/20/2022 10:15 AM Rollen Saranya [facility]   9/22/2022 10:15 AM Rollen Saranya [facility]   9/27/2022  9:45 AM Rollen Saranya [facility]   9/29/2022  8:00 AM Ricky Damian [facility]   10/3/2022  8:45 AM Kranthi Bridges PT [facility]   10/5/2022  7:40 AM Rollen Saranya [facility]   10/10/2022  8:45 AM Kranthi Bridges PT [facility]   10/12/2022  8:00 AM Kranthi Bridges PT [facility]

## 2022-09-13 ENCOUNTER — HOSPITAL ENCOUNTER (OUTPATIENT)
Dept: PHYSICAL THERAPY | Age: 32
Discharge: HOME | End: 2022-09-13
Payer: MEDICAID

## 2022-09-13 PROCEDURE — 97535 SELF CARE MNGMENT TRAINING: CPT

## 2022-09-13 PROCEDURE — 97530 THERAPEUTIC ACTIVITIES: CPT

## 2022-09-13 PROCEDURE — 97110 THERAPEUTIC EXERCISES: CPT

## 2022-09-13 PROCEDURE — 97112 NEUROMUSCULAR REEDUCATION: CPT

## 2022-09-13 NOTE — PROGRESS NOTES
PHYSICAL THERAPY - DAILY TREATMENT NOTE    Patient Name: Sugey Lopez        Date: 2022  : 1990   yes Patient  Verified  Visit #:   21  of   39  Insurance: Payor: [address] / [facility] [place] / Product Type: Managed Care Medicaid /      In time: 8:15 Out time: 9:16   Total Treatment Time: 61     Medicare/BCBS Time Tracking (below)   Total Timed Codes (min):  na 1:1 Treatment Time:  n/a     TREATMENT AREA =  Left knee pain [M25.562]    SUBJECTIVE  Pain Level (on 0 to 10 scale): 3/ 10    Medication Changes/New allergies or changes in medical history, any new surgeries or procedures?    no  If yes, update Summary List   Subjective Functional Status/Changes:  []  No changes reported     Patient reports stiffness this morning. States she had pain when walking around [facility] on  for 25 minutes. However pt reports walking around [facility] for 43 minutes yesterday and negotiating the stairs (up reciprocally and down nonreciprocal). OBJECTIVE    Blood Flow Restriction Procedure Note    Blood Flow Restriction Session Number:  3    If applicable, were there any adverse reactions to the last blood flow restriction therapy session? no      Chart reviewed for the following:  Marbella GALLEGOS, have reviewed the medical history, summary list and precautions/contraindications for Sugey Lopez.     TIME OUT performed immediately prior to start of procedure:  9:00 (enter time the timeout was completed)  Marbella GALLEGOS, have performed the following reviews on Sugey Lopez prior to the start of the session:        [x] Patient was identified by name and date of birth    [x] Purpose of blood flow restriction therapy, side effects, possible complications, risks and benefits were explained to the patient   [x] Procedure site(s) verified  [x] Informed Consent was signed (initial visit) and verified verbally (subsequent visits)  [x] Patient was instructed on the need to report any new medical conditions, procedures or medications. [x] How to respond to possible adverse effects of treatment  [x] Opportunity was given to ask any questions, all questions were answered            Location(s) of blood flow restriction cuffs:   UE:   []  Right     [x]  Left             LE:    []  Right    []  Left           Pressure applied at the cuffs: 225 mm Hg (80% of 275 mmHg 100% occlusion)    Patients response to todays treatment:   [x]  General muscle soreness []  Numbness/tingling of extremity    []  Dizziness      []  Other:      [x] Skin assessment post-treatment:    [x]  intact    []  redness- no adverse reaction     []  redness - adverse reaction:        20 min Therapeutic Exercise:  [x]  See flow sheet   Rationale:      increase ROM, increase strength and improve coordination to improve the patients ability to recruit quad     16 min Therapeutic Activity: [x]  See flow sheet   Rationale:    increase ROM and improve coordination to improve the patients ability to safely negotiate curbs/stairs with AD within the community. 15 min Neuromuscular Re-ed: [x]  See flow sheet   Rationale:    improve coordination, improve balance, and increase proprioception to improve the patients ability to improve stance phase. 10 min Self Care: Reviewed current diagnosis, prognosis, and POC. Reviewed surgical protocol and progressions. Rationale:  to improve understanding of current diagnosis with realistic expectation of PT to improve compliance/adherence and satisfaction. Billed With/As:   [x] TE   [] TA   [] Neuro   [] Self Care Patient Education: [x] Review HEP    [] Progressed/Changed HEP based on:   [] positioning   [] body mechanics   [] transfers   [] heat/ice application    [] other:      Other Objective/Functional Measures:    Patient is 11 weeks post op  *initiated PT session with warm up on elliptical followed by BFR exercises then concluded session with ladder drills.    *increase weight with single leg leg press to increase LE strenght   *ladder drills:   lateral walk (every box)  Fwd zig zag (every other box)  Lateral in and out (every box)      Post Treatment Pain Level (on 0 to 10) scale:   1/ 10     ASSESSMENT  Assessment/Changes in Function:     Patient noted reduced pain following PT session. Pt challenged with stepping pattern during ladder drills, however demo good recovery following vc's. [x]  See Progress Note/Recertification   Patient will continue to benefit from skilled PT services to modify and progress therapeutic interventions, address functional mobility deficits, analyze and cue movement patterns, and analyze and modify body mechanics/ergonomics to attain remaining goals. Progress toward goals / Updated goals:    New Goals to be achieved in __8__  weeks:  Improve FOTO score to 45/100 to show a significant functional change. 2. Pt to report decreased max pain levels less than 3/10 for improvement in QoL. 3. Pt to demonstrate Quad index of 70% to progress ACL protocol.  Progressing 09/13 indicated by focus on BFR exercises           PLAN  [x]  Upgrade activities as tolerated yes Continue plan of care   []  Discharge due to :    []  Other:      Therapist: Alberto Benites    Date: 9/13/2022 Time: 9:30 AM     Future Appointments   Date Time Provider Diana Keene   9/16/2022  8:45 AM Yash Gauthier   9/20/2022 10:15 AM Jose Martinez [facility]   9/22/2022 10:15 AM Jose Martinez [facility]   9/27/2022  9:45 AM Yash Gauthier   9/29/2022  8:00 AM Hilary Torres [facility]   10/3/2022  8:45 AM Donna Levy, PT [facility]   10/5/2022  7:40 AM Jose Martinez [facility]   10/10/2022  8:45 AM Donna Levy, PT [facility]   10/12/2022  8:00 AM Donna Levy, PT [facility]

## 2022-09-16 ENCOUNTER — HOSPITAL ENCOUNTER (OUTPATIENT)
Dept: PHYSICAL THERAPY | Age: 32
Discharge: HOME | End: 2022-09-16
Payer: MEDICAID

## 2022-09-16 PROCEDURE — 97530 THERAPEUTIC ACTIVITIES: CPT

## 2022-09-16 PROCEDURE — 97110 THERAPEUTIC EXERCISES: CPT

## 2022-09-16 PROCEDURE — 97112 NEUROMUSCULAR REEDUCATION: CPT

## 2022-09-16 PROCEDURE — 97535 SELF CARE MNGMENT TRAINING: CPT

## 2022-09-16 NOTE — PROGRESS NOTES
PHYSICAL THERAPY - DAILY TREATMENT NOTE    Patient Name: Edy Queent        Date: 2022  : 1990   yes Patient  Verified  Visit #:   24  of   36  Insurance: Payor: [address] / Plan: [facility] / Product Type: Managed Care Medicaid /      In time: 8:44 Out time: 9:30   Total Treatment Time: 46     Medicare/BCBS Time Tracking (below)   Total Timed Codes (min):  na 1:1 Treatment Time:  n/a     TREATMENT AREA =  Left knee pain [M25.562]    SUBJECTIVE  Pain Level (on 0 to 10 scale): 2/ 10    Medication Changes/New allergies or changes in medical history, any new surgeries or procedures?    no  If yes, update Summary List   Subjective Functional Status/Changes:  []  No changes reported     Pt reports still feeling stiff in the AM. Had a 1 on 1 session with a  since it came with the renewal of her [employer]. States the  took it easy compared to her PT sessions. Follow up with varicose vein MD on 10/13/22       OBJECTIVE    Blood Flow Restriction Procedure Note    Blood Flow Restriction Session Number:  4    If applicable, were there any adverse reactions to the last blood flow restriction therapy session? no      Chart reviewed for the following:  Tasneem GALLEGOS, have reviewed the medical history, summary list and precautions/contraindications for [place] Cure[place].     TIME OUT performed immediately prior to start of procedure:  9:00 (enter time the timeout was completed)  Tasneem GALLEGOS, have performed the following reviews on Edy Curet prior to the start of the session:        [x] Patient was identified by name and date of birth    [x] Purpose of blood flow restriction therapy, side effects, possible complications, risks and benefits were explained to the patient   [x] Procedure site(s) verified  [x] Informed Consent was signed (initial visit) and verified verbally (subsequent visits)  [x] Patient was instructed on the need to report any new medical conditions, procedures or medications. [x] How to respond to possible adverse effects of treatment  [x] Opportunity was given to ask any questions, all questions were answered            Location(s) of blood flow restriction cuffs:   UE:   []  Right     [x]  Left             LE:    []  Right    []  Left           Pressure applied at the cuffs: 192 mm Hg (80% of 240 mmHg 100% occlusion)    Patients response to todays treatment:   [x]  General muscle soreness []  Numbness/tingling of extremity    []  Dizziness      []  Other:      [x] Skin assessment post-treatment:    [x]  intact    []  redness- no adverse reaction     []  redness - adverse reaction:        16 min Therapeutic Exercise:  [x]  See flow sheet   Rationale:      increase ROM, increase strength and improve coordination to improve the patients ability to recruit quad     12 min Therapeutic Activity: [x]  See flow sheet   Rationale:    increase ROM and improve coordination to improve the patients ability to safely negotiate curbs/stairs with AD within the community. 10 min Neuromuscular Re-ed: [x]  See flow sheet   Rationale:    improve coordination, improve balance, and increase proprioception to improve the patients ability to improve stance phase. 8 min Self Care: Reviewed current diagnosis, prognosis, and POC. Reviewed surgical protocol, progressions, and goals at 12 weeks post op. Rationale:  to improve understanding of current diagnosis with realistic expectation of PT to improve compliance/adherence and satisfaction. Billed With/As:   [x] TE   [] TA   [] Neuro   [] Self Care Patient Education: [x] Review HEP    [] Progressed/Changed HEP based on:   [] positioning   [] body mechanics   [] transfers   [] heat/ice application    [] other:      Other Objective/Functional Measures:    Patient is 12 weeks post op today. *performed exercises under BFR for hamstring and quadriceps strength.  Added LAQ      Post Treatment Pain Level (on 0 to 10) scale:   0/ 10     ASSESSMENT  Assessment/Changes in Function:     Patient noted no pain following PT session. Discussed having discussion with  on challenging LE strength within reason. Pt also inquired of TENS unit with [place] to assist with muscle contraction. Discussed bringing device to clinic to assure appropriate intervention. [x]  See Progress Note/Recertification   Patient will continue to benefit from skilled PT services to modify and progress therapeutic interventions, address functional mobility deficits, analyze and cue movement patterns, and analyze and modify body mechanics/ergonomics to attain remaining goals. Progress toward goals / Updated goals:    New Goals to be achieved in __8__  weeks:  Improve FOTO score to 45/100 to show a significant functional change. 2. Pt to report decreased max pain levels less than 3/10 for improvement in QoL. 3. Pt to demonstrate Quad index of 70% to progress ACL protocol.  Progressing 09/13 indicated by focus on BFR exercises        PLAN  [x]  Upgrade activities as tolerated yes Continue plan of care   []  Discharge due to :    []  Other:      Therapist: Herber Epperson    Date: 9/16/2022 Time: 2:36 PM     Future Appointments   Date Time Provider Diana Keene   9/20/2022 10:15 AM Niesha Port [facility]   9/22/2022 10:15 AM Niesha Port [facility]   9/27/2022  9:45 AM Niesha Port [facility]   9/29/2022  8:00 AM Graciela Oates [facility]   10/3/2022  8:45 AM Ruthy [facility]endez, [facility]   10/5/2022  7:40 AM Niesha Port [facility]   10/10/2022  8:45 AM Ruthy Resendez, PT [facility]   10/12/2022  8:00 AM Ruthy Resendez, PT [facility]

## 2022-09-20 ENCOUNTER — HOSPITAL ENCOUNTER (OUTPATIENT)
Dept: PHYSICAL THERAPY | Age: 32
Discharge: HOME | End: 2022-09-20
Payer: MEDICAID

## 2022-09-20 PROCEDURE — 97110 THERAPEUTIC EXERCISES: CPT

## 2022-09-20 PROCEDURE — 97530 THERAPEUTIC ACTIVITIES: CPT

## 2022-09-20 PROCEDURE — 97535 SELF CARE MNGMENT TRAINING: CPT

## 2022-09-20 PROCEDURE — 97112 NEUROMUSCULAR REEDUCATION: CPT

## 2022-09-20 NOTE — PROGRESS NOTES
PHYSICAL THERAPY - DAILY TREATMENT NOTE    Patient Name: Franchesca Lan        Date: 2022  : 1990   yes Patient  Verified  Visit #:   25  of   36  Insurance: Payor: Charline Levinee / [facility] [place] / Product Type: Managed Care Medicaid /      In time: 10:05 Out time: 1058   Total Treatment Time: 53     Medicare/BCBS Time Tracking (below)   Total Timed Codes (min):  na 1:1 Treatment Time:  n/a     TREATMENT AREA =  Left knee pain [M25.562]    SUBJECTIVE  Pain Level (on 0 to 10 scale): 0/ 10    Medication Changes/New allergies or changes in medical history, any new surgeries or procedures?    no  If yes, update Summary List   Subjective Functional Status/Changes:  []  No changes reported     Pt reports walking around [facility] and doing stairs for 45 minutes yesterday. States she also went to the gym to do some of the HEP and some of her own exercises. Surprised it didn't feel too stiff this morning. Only noted 1 instance of \"doing the splits\" over the weekend       OBJECTIVE    Blood Flow Restriction Procedure Note    Blood Flow Restriction Session Number:  5    If applicable, were there any adverse reactions to the last blood flow restriction therapy session? no      Chart reviewed for the following:  Reginaldo GALLEGOS, have reviewed the medical history, summary list and precautions/contraindications for Franchesca Lan.     TIME OUT performed immediately prior to start of procedure:  1058 AM (enter time the timeout was completed)  Reginaldo GALLEGOS, have performed the following reviews on Franchesca Lan prior to the start of the session:        [x] Patient was identified by name and date of birth    [x] Purpose of blood flow restriction therapy, side effects, possible complications, risks and benefits were explained to the patient   [x] Procedure site(s) verified  [x] Informed Consent was signed (initial visit) and verified verbally (subsequent visits)  [x] Patient was instructed on the need to report any new medical conditions, procedures or medications. [x] How to respond to possible adverse effects of treatment  [x] Opportunity was given to ask any questions, all questions were answered            Location(s) of blood flow restriction cuffs:   UE:   []  Right     [x]  Left             LE:    []  Right    []  Left           Pressure applied at the cuffs: 144 mm Hg (80% of 180 mmHg 100% occlusion)    Patients response to todays treatment:   [x]  General muscle soreness []  Numbness/tingling of extremity    []  Dizziness      []  Other:      [x] Skin assessment post-treatment:    [x]  intact    []  redness- no adverse reaction     []  redness - adverse reaction:        20 min Therapeutic Exercise:  [x]  See flow sheet   Rationale:      increase ROM, increase strength and improve coordination to improve the patients ability to recruit quad     15 min Therapeutic Activity: [x]  See flow sheet   Rationale:    increase ROM and improve coordination to improve the patients ability to safely negotiate curbs/stairs with AD within the community. 10 min Neuromuscular Re-ed: [x]  See flow sheet   Rationale:    improve coordination, improve balance, and increase proprioception to improve the patients ability to improve stance phase. 8 min Self Care: Reviewed current diagnosis, prognosis, and POC. Reviewed surgical protocol, progressions, and goals at 12 weeks post op. Rationale:  to improve understanding of current diagnosis with realistic expectation of PT to improve compliance/adherence and satisfaction.     Billed With/As:   [x] TE   [] TA   [] Neuro   [] Self Care Patient Education: [x] Review HEP    [] Progressed/Changed HEP based on:   [] positioning   [] body mechanics   [] transfers   [] heat/ice application    [] other:      Other Objective/Functional Measures:    *added lateral tap downs with BFR and increase weight with single leg press      Post Treatment Pain Level (on 0 to 10) scale:   0/ 10     ASSESSMENT  Assessment/Changes in Function:     Patient noted no pain following PT session. Pt demo visible shaking during 4 inch lateral tap downs and occasional anterior knee pain, cues for posterior shift. Pt noted no pain following cue. [x]  See Progress Note/Recertification   Patient will continue to benefit from skilled PT services to modify and progress therapeutic interventions, address functional mobility deficits, analyze and cue movement patterns, and analyze and modify body mechanics/ergonomics to attain remaining goals. Progress toward goals / Updated goals:    New Goals to be achieved in __8__  weeks:  Improve FOTO score to 45/100 to show a significant functional change. 2. Pt to report decreased max pain levels less than 3/10 for improvement in QoL. 3. Pt to demonstrate Quad index of 70% to progress ACL protocol.  Progressing 09/20 indicated by focus on BFR exercises and pt subjective reports of decrease instability        PLAN  [x]  Upgrade activities as tolerated yes Continue plan of care   []  Discharge due to :    []  Other:      Therapist: Mason Lawrence    Date: 9/20/2022 Time: 11:00 AM     Future Appointments   Date Time Provider Diana Keene   9/22/2022 10:15 AM Valery De Santiago [facility]   9/27/2022  9:45 AM Valery De Santiago [facility]   9/29/2022  8:00 AM Conrad Whitman [facility]   10/3/2022  8:45 AM Sylvia Bolivar PT [facility]   10/5/2022  7:40 AM Valery De Santiago [facility]   10/10/2022  8:45 AM Sylvia Bolivar PT [facility]   10/12/2022  8:00 AM Sylvia Bolivar PT [facility]

## 2022-09-22 ENCOUNTER — HOSPITAL ENCOUNTER (OUTPATIENT)
Dept: PHYSICAL THERAPY | Age: 32
Discharge: HOME | End: 2022-09-22
Payer: MEDICAID

## 2022-09-22 PROCEDURE — 97110 THERAPEUTIC EXERCISES: CPT

## 2022-09-22 PROCEDURE — 97112 NEUROMUSCULAR REEDUCATION: CPT

## 2022-09-22 PROCEDURE — 97535 SELF CARE MNGMENT TRAINING: CPT

## 2022-09-22 PROCEDURE — 97530 THERAPEUTIC ACTIVITIES: CPT

## 2022-09-22 NOTE — PROGRESS NOTES
PHYSICAL THERAPY - DAILY TREATMENT NOTE    Patient Name: Dre Suggs        Date: 2022  : 1990   yes Patient  Verified  Visit #:   32  of   36  Insurance: Payor: [address] / [facility] / Product Type: Managed Care Medicaid /      In time: 10:01 Out time: 10:58   Total Treatment Time: 57     Medicare/BCBS Time Tracking (below)   Total Timed Codes (min):  na 1:1 Treatment Time:  n/a     TREATMENT AREA =  Left knee pain [M25.562]    SUBJECTIVE  Pain Level (on 0 to 10 scale): 3/ 10    Medication Changes/New allergies or changes in medical history, any new surgeries or procedures?    no  If yes, update Summary List   Subjective Functional Status/Changes:  []  No changes reported     Pt reports having pain and soreness today because she heavily cleaned the house. Hasn't gone to the gym since . Will be having outpatient eye surgery tomorrow and will be unable to return to PT until Wednesday per post surgical precaution       OBJECTIVE    Blood Flow Restriction Procedure Note    Blood Flow Restriction Session Number:  6    If applicable, were there any adverse reactions to the last blood flow restriction therapy session? no      Chart reviewed for the following:  ICaroline, have reviewed the medical history, summary list and precautions/contraindications for Dre Suggs.     TIME OUT performed immediately prior to start of procedure:  10:48 AM (enter time the timeout was completed)  Caroline GALLEGOS, have performed the following reviews on Dre Suggs prior to the start of the session:        [x] Patient was identified by name and date of birth    [x] Purpose of blood flow restriction therapy, side effects, possible complications, risks and benefits were explained to the patient   [x] Procedure site(s) verified  [x] Informed Consent was signed (initial visit) and verified verbally (subsequent visits)  [x] Patient was instructed on the need to report any new medical conditions, procedures or medications. [x] How to respond to possible adverse effects of treatment  [x] Opportunity was given to ask any questions, all questions were answered            Location(s) of blood flow restriction cuffs:   UE:   []  Right     [x]  Left             LE:    []  Right    []  Left           Pressure applied at the cuffs: 168 mm Hg (80% of 210 mmHg 100% occlusion)    Patients response to todays treatment:   [x]  General muscle soreness []  Numbness/tingling of extremity    []  Dizziness      []  Other:      [x] Skin assessment post-treatment:    [x]  intact    []  redness- no adverse reaction     []  redness - adverse reaction:        20 min Therapeutic Exercise:  [x]  See flow sheet   Rationale:      increase ROM, increase strength and improve coordination to improve the patients ability to recruit quad     15 min Therapeutic Activity: [x]  See flow sheet   Rationale:    increase ROM and improve coordination to improve the patients ability to safely negotiate curbs/stairs with AD within the community. 12 min Neuromuscular Re-ed: [x]  See flow sheet   Rationale:    improve coordination, improve balance, and increase proprioception to improve the patients ability to improve stance phase. 10 min Self Care: Reviewed current diagnosis, prognosis, and POC. Reviewed surgical protocol, progressions, and goals at 12 weeks post op. Rationale:  to improve understanding of current diagnosis with realistic expectation of PT to improve compliance/adherence and satisfaction.     Billed With/As:   [x] TE   [] TA   [] Neuro   [] Self Care Patient Education: [x] Review HEP    [] Progressed/Changed HEP based on:   [] positioning   [] body mechanics   [] transfers   [] heat/ice application    [] other:      Other Objective/Functional Measures:    *warm up on elliptical followed by BFR exercises (alternating between quadriceps and hamstring to reduce strain) then lunges without BFR     Post Treatment Pain Level (on 0 to 10) scale:   2/ 10     ASSESSMENT  Assessment/Changes in Function:     Performed 4 inch lateral tap downs with improved controlled and decrease muscle shaking - cues for toe tap for first 30 repetitions and heel tap for remaining sets. Discussed gentle LE exercises to remain compliant with post eye surgery healing timeframe. [x]  See Progress Note/Recertification   Patient will continue to benefit from skilled PT services to modify and progress therapeutic interventions, address functional mobility deficits, analyze and cue movement patterns, and analyze and modify body mechanics/ergonomics to attain remaining goals. Progress toward goals / Updated goals:    New Goals to be achieved in __8__  weeks:  Improve FOTO score to 45/100 to show a significant functional change. 2. Pt to report decreased max pain levels less than 3/10 for improvement in QoL. 3. Pt to demonstrate Quad index of 70% to progress ACL protocol.  Progressing 09/20 indicated by focus on BFR exercises and pt subjective reports of decrease instability        PLAN  [x]  Upgrade activities as tolerated yes Continue plan of care   []  Discharge due to :    []  Other:      Therapist: BONY Torres    Date: 9/22/2022 Time: 5:25 PM     Future Appointments   Date Time Provider Dinaa Keene   9/29/2022  8:00 AM Jennifer Pain [facility]   10/3/2022  8:45 AM Liv Patch, [facility]   10/5/2022  7:40 AM Jennifer Pain [facility]   10/10/2022  8:45 AM Liv Patch, PT [facility]   10/12/2022  8:00 AM Liv Patch, PT [facility]

## 2022-09-27 ENCOUNTER — APPOINTMENT (OUTPATIENT)
Dept: PHYSICAL THERAPY | Age: 32
End: 2022-09-27
Payer: MEDICAID

## 2022-09-29 ENCOUNTER — APPOINTMENT (OUTPATIENT)
Dept: PHYSICAL THERAPY | Age: 32
End: 2022-09-29
Payer: MEDICAID

## 2022-09-30 ENCOUNTER — HOSPITAL ENCOUNTER (OUTPATIENT)
Dept: PHYSICAL THERAPY | Age: 32
End: 2022-09-30
Payer: MEDICAID

## 2022-09-30 ENCOUNTER — TELEPHONE (OUTPATIENT)
Dept: PHYSICAL THERAPY | Age: 32
End: 2022-09-30

## 2022-10-03 ENCOUNTER — HOSPITAL ENCOUNTER (OUTPATIENT)
Dept: PHYSICAL THERAPY | Age: 32
Discharge: HOME | End: 2022-10-03
Payer: MEDICAID

## 2022-10-03 PROCEDURE — 97535 SELF CARE MNGMENT TRAINING: CPT

## 2022-10-03 PROCEDURE — 97112 NEUROMUSCULAR REEDUCATION: CPT

## 2022-10-03 PROCEDURE — 97110 THERAPEUTIC EXERCISES: CPT

## 2022-10-03 PROCEDURE — 97530 THERAPEUTIC ACTIVITIES: CPT

## 2022-10-03 NOTE — PROGRESS NOTES
PHYSICAL THERAPY - DAILY TREATMENT NOTE    Patient Name: Trevon Marina        Date: 10/3/2022  : 1990   yes Patient  Verified  Visit #:   32  of   36  Insurance: Payor: [address] / [facility] / Product Type: Managed Care Medicaid /      In time: 8:43 Out time: 9:30   Total Treatment Time: 47     Medicare/BCBS Time Tracking (below)   Total Timed Codes (min):  na 1:1 Treatment Time:  n/a     TREATMENT AREA =  Left knee pain [M25.562]    SUBJECTIVE  Pain Level (on 0 to 10 scale): 3 10    Medication Changes/New allergies or changes in medical history, any new surgeries or procedures?    no  If yes, update Summary List   Subjective Functional Status/Changes:  []  No changes reported     Reports knee felt crazy last night probably because of the weather. Feels better this morning. OBJECTIVE    Blood Flow Restriction Procedure Note    Blood Flow Restriction Session Number:  7    If applicable, were there any adverse reactions to the last blood flow restriction therapy session? no      Chart reviewed for the following:  IJose Manuel PT, have reviewed the medical history, summary list and precautions/contraindications for Trevon Marina. TIME OUT performed immediately prior to start of procedure:  8:48 AM (enter time the timeout was completed)  Jose Manuel GALLEGOS PT, have performed the following reviews on Trevon Marina prior to the start of the session:        [x] Patient was identified by name and date of birth    [x] Purpose of blood flow restriction therapy, side effects, possible complications, risks and benefits were explained to the patient   [x] Procedure site(s) verified  [x] Informed Consent was signed (initial visit) and verified verbally (subsequent visits)  [x] Patient was instructed on the need to report any new medical conditions, procedures or medications.   [x] How to respond to possible adverse effects of treatment  [x] Opportunity was given to ask any questions, all questions were answered            Location(s) of blood flow restriction cuffs:   UE:   []  Right     [x]  Left             LE:    []  Right    []  Left           Pressure applied at the cuffs: 168 mm Hg (80% of 210 mmHg 100% occlusion)    Patients response to todays treatment:   [x]  General muscle soreness []  Numbness/tingling of extremity    []  Dizziness      []  Other:      [x] Skin assessment post-treatment:    [x]  intact    []  redness- no adverse reaction     []  redness - adverse reaction:        15 min Therapeutic Exercise:  [x]  See flow sheet   Rationale:      increase ROM, increase strength and improve coordination to improve the patients ability to recruit quad     14 min Therapeutic Activity: [x]  See flow sheet   Rationale:    increase ROM and improve coordination to improve the patients ability to safely negotiate curbs/stairs with AD within the community. 10 min Neuromuscular Re-ed: [x]  See flow sheet   Rationale:    improve coordination, improve balance, and increase proprioception to improve the patients ability to improve stance phase. 8 min Self Care: Reassessed goals and discussed POC   Rationale:  to improve understanding of current diagnosis with realistic expectation of PT to improve compliance/adherence and satisfaction.     Billed With/As:   [x] TE   [] TA   [] Neuro   [] Self Care Patient Education: [x] Review HEP    [] Progressed/Changed HEP based on:   [] positioning   [] body mechanics   [] transfers   [] heat/ice application    [] other:      Other Objective/Functional Measures:    *warm up on elliptical followed by BFR exercises (alternating between quadriceps and hamstring to reduce strain) then lunges without BFR     Post Treatment Pain Level (on 0 to 10) scale:   0/ 10     ASSESSMENT  Assessment/Changes in Function:     See PN     [x]  See Progress Note/Recertification   Patient will continue to benefit from skilled PT services to modify and progress therapeutic interventions, address functional mobility deficits, analyze and cue movement patterns, and analyze and modify body mechanics/ergonomics to attain remaining goals.    Progress toward goals / Updated goals:    See PN       PLAN  [x]  Upgrade activities as tolerated yes Continue plan of care   []  Discharge due to :    []  Other:      Therapist: Haile Flynn PT, DPT    Date: 10/3/2022 Time: 5:25 PM     Future Appointments   Date Time Provider Diana Keene   10/3/2022  8:45 AM Jihan Hernandez PT [facility]   10/5/2022  7:40 AM Ej Lo [facility]   10/10/2022  8:45 AM Jihan Hernandez PT [facility]   10/12/2022  8:00 AM Jihan Hernandez PT [facility]   10/18/2022 10:50 AM Jihan Hernandez PT [facility]   10/20/2022  9:40 AM Rika Liang [facility]   10/25/2022  2:30 PM Jihan Hernandez PT [facility]   10/27/2022  2:30 PM Jihan Hernandez PT [facility]

## 2022-10-03 NOTE — PROGRESS NOTES
[address] [place] [address] PHYSICAL THERAPY  [address] [place] [address] 201,[facility], [facility] - Phone: [phone]  Fax: [phone]  PROGRESS NOTE  Patient Name: Laura Nguyễn : 1990   Treatment/Medical Diagnosis: Left knee pain [M25.562]   Referral Source: [facility],*     Date of Initial Visit: 22 Attended Visits: 27 Missed Visits: 1     SUMMARY OF TREATMENT  Patient has attended 26 PT sessions, including an initial evaluation for L knee pain (s/p L ACLR hamstring autograft 22). PT interventions have included manual therapy, therapeutic exercises, patient education, and HEP to improve L knee ROM strength, and stability. CP for pain control and decrease edema. CURRENT STATUS  Patient is currently 14 weeks post op. Patient has made good progress since beginning PT. Notes max pain level at 3/10 after being on her feet for hours, but average pain level of 1/10. Reports improvement with stair negotiation, longer periods of ambulation, transfers and all ADLs. 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 She would continue to benefit form skilled PT to address strength deficits and continue with post op ACL protocol. Goal/Measure of Progress Goal Met? 1.  Pt to demonstrate 25lbs lift and carry for 100ft to progress work activities. Status at last Eval: Goal established Current Status: 40lbs yes   2. Pt to report decreased max pain levels less than 6/10 for improvement in ADLs. Status at last Eval: 7/10 Current Status: 3/10 yes   3. Pt to demonstrate Quad index of 70% to progress ACL protocol. Status at last Eval: Goal established Current Status: R quad: 48lb  L quad: 40lb yes     New Goals to be achieved in __8__  weeks:  Pt to demonstrate full and appropriate running mechanics to return to PLOF.   2. Pt to report decreased max pain levels less than 2/10 for improvement in QoL. 3. Pt to report return to work with no adverse reactions. RECOMMENDATIONS  Recommend 1-2x/week for 8 weeks to continue to improve L knee strength, and stability to return to PLOF and complete functional mobility activities. Thank you for this referral.   If you have any questions/comments please contact us directly at [phone]. Thank you for allowing us to assist in the care of your patient. Therapist Signature: Joslyn Carmichael PT, DPT Date: 10/3/2022     Time: 5:04 PM   NOTE TO PHYSICIAN:  PLEASE COMPLETE THE ORDERS BELOW AND FAX TO   [facility] [place] [facility]: [phone]  If you are unable to process this request in 24 hours please contact our office: [phone]    ___ I have read the above report and request that my patient continue as recommended.   ___ I have read the above report and request that my patient continue therapy with the following changes/special instructions:_________________________________________________________   ___ I have read the above report and request that my patient be discharged from therapy.      Physician Signature:        Date:       Time:                                 Citlaly Johnson

## 2022-10-05 ENCOUNTER — HOSPITAL ENCOUNTER (OUTPATIENT)
Dept: PHYSICAL THERAPY | Age: 32
Discharge: HOME | End: 2022-10-05
Payer: MEDICAID

## 2022-10-05 PROCEDURE — 97110 THERAPEUTIC EXERCISES: CPT

## 2022-10-05 PROCEDURE — 97530 THERAPEUTIC ACTIVITIES: CPT

## 2022-10-05 PROCEDURE — 97112 NEUROMUSCULAR REEDUCATION: CPT

## 2022-10-05 PROCEDURE — 97535 SELF CARE MNGMENT TRAINING: CPT

## 2022-10-05 NOTE — PROGRESS NOTES
PHYSICAL THERAPY - DAILY TREATMENT NOTE    Patient Name: Osman Cabrera        Date: 10/5/2022  : 1990   yes Patient  Verified  Visit #:   32  of   36  Insurance: Payor: [address] / [facility] [place] / Product Type: Managed Care Medicaid /      In time: 890 Out time: 840   Total Treatment Time: 60     Medicare/BCBS Time Tracking (below)   Total Timed Codes (min):  na 1:1 Treatment Time:  n/a     TREATMENT AREA =  Left knee pain [M25.562]    SUBJECTIVE  Pain Level (on 0 to 10 scale): 2/ 10    Medication Changes/New allergies or changes in medical history, any new surgeries or procedures?    no  If yes, update Summary List   Subjective Functional Status/Changes:  []  No changes reported     Pt reports having a little bit of soreness in the knee. States that she went to MD, and was cleared to go back to work on 10/24. OBJECTIVE    Blood Flow Restriction Procedure Note    Blood Flow Restriction Session Number:  8    If applicable, were there any adverse reactions to the last blood flow restriction therapy session? no      Chart reviewed for the following:  ITamika, have reviewed the medical history, summary list and precautions/contraindications for Osman Cabrera. TIME OUT performed immediately prior to start of procedure:  8:40 (enter time the timeout was completed)  ITamika, have performed the following reviews on Osman Cabrera prior to the start of the session:        [x] Patient was identified by name and date of birth    [x] Purpose of blood flow restriction therapy, side effects, possible complications, risks and benefits were explained to the patient   [x] Procedure site(s) verified  [x] Informed Consent was signed (initial visit) and verified verbally (subsequent visits)  [x] Patient was instructed on the need to report any new medical conditions, procedures or medications.   [x] How to respond to possible adverse effects of treatment  [x] Opportunity was given to ask any questions, all questions were answered            Location(s) of blood flow restriction cuffs:   UE:   []  Right     [x]  Left             LE:    []  Right    []  Left           Pressure applied at the cuffs: 176 mm Hg (80% of 220 mmHg 100% occlusion)    Patients response to todays treatment:   [x]  General muscle soreness []  Numbness/tingling of extremity    []  Dizziness      []  Other:      [x] Skin assessment post-treatment:    [x]  intact    []  redness- no adverse reaction     []  redness - adverse reaction:        20 min Therapeutic Exercise:  [x]  See flow sheet   Rationale:      increase ROM, increase strength and improve coordination to improve the patients ability to recruit quad     10 min Therapeutic Activity: [x]  See flow sheet   Rationale:    increase ROM and improve coordination to improve the patients ability to safely negotiate curbs/stairs with AD within the community. 20 min Neuromuscular Re-ed: [x]  See flow sheet   Rationale:    improve coordination, improve balance, and increase proprioception to improve the patients ability to improve stance phase. 10 min Self Care: Reviewed HEP. Reviewed current diagnosis, prognosis, and POC. Reviewed job duties and discussed implementation of PT interventions to assist with return to work duties. Rationale:  to improve understanding of current diagnosis with realistic expectation of PT to improve compliance/adherence and satisfaction.         Billed With/As:   [x] TE   [] TA   [] Neuro   [] Self Care Patient Education: [x] Review HEP    [] Progressed/Changed HEP based on:   [] positioning   [] body mechanics   [] transfers   [] heat/ice application    [] other:      Other Objective/Functional Measures:    *warm up on elliptical followed by exercises without BFR (ladder drills on mat, lunges, lateral tap downs) followed by BFR exercises (alternating between quadriceps and hamstring to reduce strain)   *performed ladder drills on mat to improve tolerance with RTW activities      Post Treatment Pain Level (on 0 to 10) scale:   0/ 10     ASSESSMENT  Assessment/Changes in Function:     Patient demo good tolerance with progression of PT interventions indicated by no pain following PT session. Pt  demo good balance with obstacle negotiation (ladder drills on mat), able to remain upright and demo no misstep or LOB during drill. Will continue to progress PT interventions within pt's tolerance and POC. [x]  See Progress Note/Recertification   Patient will continue to benefit from skilled PT services to modify and progress therapeutic interventions, address functional mobility deficits, analyze and cue movement patterns, and analyze and modify body mechanics/ergonomics to attain remaining goals. Progress toward goals / Updated goals:    New Goals to be achieved in __8__  weeks:  Pt to demonstrate full and appropriate running mechanics to return to PLOF. 2. Pt to report decreased max pain levels less than 2/10 for improvement in QoL. 3. Pt to report return to work with no adverse reactions.  Initiated return to work exercises 10/05          PLAN  [x]  Upgrade activities as tolerated yes Continue plan of care   []  Discharge due to :    []  Other:      Therapist: Khadar Mejia    Date: 10/5/2022 Time: 857 AM     Future Appointments   Date Time Provider Daina Keene   10/10/2022  8:45 AM Clearlake Oaksdavid Mirza, PT [facility]   10/12/2022  8:00 AM [place] Hermes, PT [facility]   10/18/2022 10:50 AM Carlene Hermes, PT [facility]   10/20/2022  9:40 AM Chyrl Idol [facility]   10/25/2022  2:30 PM Carlene Hermes, PT [facility]   10/27/2022  2:30 PM [place] Hermes, PT [facility]

## 2022-10-10 ENCOUNTER — TELEPHONE (OUTPATIENT)
Dept: PHYSICAL THERAPY | Age: 32
End: 2022-10-10

## 2022-10-10 ENCOUNTER — APPOINTMENT (OUTPATIENT)
Dept: PHYSICAL THERAPY | Age: 32
End: 2022-10-10
Payer: MEDICAID

## 2022-10-12 ENCOUNTER — HOSPITAL ENCOUNTER (OUTPATIENT)
Dept: PHYSICAL THERAPY | Age: 32
Discharge: HOME | End: 2022-10-12
Payer: MEDICAID

## 2022-10-12 PROCEDURE — 97112 NEUROMUSCULAR REEDUCATION: CPT

## 2022-10-12 PROCEDURE — 97535 SELF CARE MNGMENT TRAINING: CPT

## 2022-10-12 PROCEDURE — 97530 THERAPEUTIC ACTIVITIES: CPT

## 2022-10-12 PROCEDURE — 97110 THERAPEUTIC EXERCISES: CPT

## 2022-10-12 NOTE — PROGRESS NOTES
PHYSICAL THERAPY - DAILY TREATMENT NOTE    Patient Name: Osman Cabrera        Date: 10/12/2022  : 1990   yes Patient  Verified  Visit #:   29  of   36  Insurance: Payor: [address] / Plan: [address] [facility] / Product Type: Managed Care Medicaid /      In time: 249 Out time: 414   Total Treatment Time: 46     Medicare/BCBS Time Tracking (below)   Total Timed Codes (min):  na 1:1 Treatment Time:  n/a     TREATMENT AREA =  Left knee pain [M25.562]    SUBJECTIVE  Pain Level (on 0 to 10 scale): 0/ 10    Medication Changes/New allergies or changes in medical history, any new surgeries or procedures?    no  If yes, update Summary List   Subjective Functional Status/Changes:  []  No changes reported     Went to her vein apt yesterday. MD told her she had some swelling, but it wasn't enough to do anything with. MD was not concerned with the varicose veins. MD advised her to get compression sleeves to wear on her legs to reduce swelling. OBJECTIVE    Blood Flow Restriction Procedure Note    Blood Flow Restriction Session Number:  9    If applicable, were there any adverse reactions to the last blood flow restriction therapy session? no      Chart reviewed for the following:  Abbie GALLEGOS PT, have reviewed the medical history, summary list and precautions/contraindications for Osman Cabrera.     TIME OUT performed immediately prior to start of procedure:  8:40 (enter time the timeout was completed)  Abbie GALLEGOS PT, have performed the following reviews on Osman Cabrera prior to the start of the session:        [x] Patient was identified by name and date of birth    [x] Purpose of blood flow restriction therapy, side effects, possible complications, risks and benefits were explained to the patient   [x] Procedure site(s) verified  [x] Informed Consent was signed (initial visit) and verified verbally (subsequent visits)  [x] Patient was instructed on the need to report any new medical conditions, procedures or medications. [x] How to respond to possible adverse effects of treatment  [x] Opportunity was given to ask any questions, all questions were answered            Location(s) of blood flow restriction cuffs:   UE:   []  Right     [x]  Left             LE:    []  Right    []  Left           Pressure applied at the cuffs: 176 mm Hg (80% of 220 mmHg 100% occlusion)    Patients response to todays treatment:   [x]  General muscle soreness []  Numbness/tingling of extremity    []  Dizziness      []  Other:      [x] Skin assessment post-treatment:    [x]  intact    []  redness- no adverse reaction     []  redness - adverse reaction:        12 min Therapeutic Exercise:  [x]  See flow sheet   Rationale:      increase ROM, increase strength and improve coordination to improve the patients ability to recruit quad     16 min Therapeutic Activity: [x]  See flow sheet   Rationale:    increase ROM and improve coordination to improve the patients ability to safely negotiate curbs/stairs with AD within the community. 10 min Neuromuscular Re-ed: [x]  See flow sheet   Rationale:    improve coordination, improve balance, and increase proprioception to improve the patients ability to improve stance phase. 8 min Self Care: Discussion about return to running and when to start plyometric program; getting back to jumping jacks   Rationale:     Pt education  to improve the patients ability to progress PT.     Billed With/As:   [x] TE   [] TA   [] Neuro   [] Self Care Patient Education: [x] Review HEP    [] Progressed/Changed HEP based on:   [] positioning   [] body mechanics   [] transfers   [] heat/ice application    [] other:      Other Objective/Functional Measures:  *15 weeks post op  *warm up on elliptical followed by exercises without BFR (ladder drills on mat, DL bounce on trampoline [id number], \"jog\" in place on trampoline [id number]) followed by BFR exercises (alternating between quadriceps and hamstring to reduce strain)        Post Treatment Pain Level (on 0 to 10) scale:   0/ 10     ASSESSMENT  Assessment/Changes in Function:     Continue to progress well. Performed step down under BFR due to pt trouble with descending stairs at [facility]. Ladder drills done a bit quicker today and trampoline added to progress to good running mechanics at 18weeks. []  See Progress Note/Recertification   Patient will continue to benefit from skilled PT services to modify and progress therapeutic interventions, address functional mobility deficits, analyze and cue movement patterns, and analyze and modify body mechanics/ergonomics to attain remaining goals. Progress toward goals / Updated goals:    New Goals to be achieved in __8__  weeks:  Pt to demonstrate full and appropriate running mechanics to return to [facility]. Progressing 10/12/22  2. Pt to report decreased max pain levels less than 2/10 for improvement in QoL. 3. Pt to report return to work with no adverse reactions.  Initiated return to work exercises 10/05          PLAN  [x]  Upgrade activities as tolerated yes Continue plan of care   []  Discharge due to :    []  Other:      Therapist: Cindy Alves PT, DPT    Date: 10/12/2022 Time: 871 AM     Future Appointments   Date Time Provider Diana Keene   10/12/2022  8:00 AM Joi Marking, PT [facility]   10/14/2022  9:55 AM Christopher Avila [facility]   10/18/2022 10:50 AM Joi Marking, PT [facility]   10/20/2022  9:40 AM Christopher Avila [facility]   10/25/2022  2:30 PM Joi Marking, PT [facility]   10/27/2022  2:30 PM Joi Marking, PT [facility]

## 2022-10-14 ENCOUNTER — HOSPITAL ENCOUNTER (OUTPATIENT)
Dept: PHYSICAL THERAPY | Age: 32
Discharge: HOME | End: 2022-10-14
Payer: MEDICAID

## 2022-10-14 PROCEDURE — 97110 THERAPEUTIC EXERCISES: CPT

## 2022-10-14 PROCEDURE — 97112 NEUROMUSCULAR REEDUCATION: CPT

## 2022-10-14 PROCEDURE — 97530 THERAPEUTIC ACTIVITIES: CPT

## 2022-10-14 NOTE — PROGRESS NOTES
21swPHYSICAL THERAPY - DAILY TREATMENT NOTE    Patient Name: Laura Nguyễn        Date: 10/14/2022  : 1990   yes Patient  Verified  Visit #:   27  of   36  Insurance: Payor: [address] / [facility] [place] / Product Type: Managed Care Medicaid /      In time: 052 Out time: 9655   Total Treatment Time: 41     Medicare/BCBS Time Tracking (below)   Total Timed Codes (min):  na 1:1 Treatment Time:  n/a     TREATMENT AREA =  Left knee pain [M25.562]    SUBJECTIVE  Pain Level (on 0 to 10 scale): 2/ 10    Medication Changes/New allergies or changes in medical history, any new surgeries or procedures?    no  If yes, update Summary List   Subjective Functional Status/Changes:  []  No changes reported     Pt reports running around a lot doing errands this AM so the knee is feeling a little annoyed. States she hasn't been able to walk around [facility] as much but was able to over the weekend without issues. OBJECTIVE    10 min Therapeutic Exercise:  [x]  See flow sheet   Rationale:      increase ROM, increase strength and improve coordination to improve the patients ability to recruit quad     16 min Therapeutic Activity: [x]  See flow sheet   Rationale:    increase ROM and improve coordination to improve the patients ability to safely negotiate curbs/stairs with AD within the community. 15 min Neuromuscular Re-ed: [x]  See flow sheet   Rationale:    improve coordination, improve balance, and increase proprioception to improve the patients ability to improve stance phase. Billed With/As:   [x] TE   [] TA   [] Neuro   [] Self Care Patient Education: [x] Review HEP    [] Progressed/Changed HEP based on:   [] positioning   [] body mechanics   [] transfers   [] heat/ice application    [] other:      Other Objective/Functional Measures:    *16 weeks post op  *warm up on elliptical followed by exercises without BFR.  Performed all exercises today without BFR (see flowsheet for loads, drills, & volumes). Added multiple exercises to introduce light plyometrics/return to running. *alternated between 6 inch lateral tap downs and resisted hamstring curls to reduce strain   *performed forward walking with ladder with sporadic foam and hurdles in prep for RTW       Post Treatment Pain Level (on 0 to 10) scale:   0/ 10     ASSESSMENT  Assessment/Changes in Function:     Quick to fatigue with exercises, rest breaks performed periodically to prevent exacerbation of symptoms. Pt noted anterior knee discomfort when landing during mini squat jumps. Cues for posterior shift and light landing - sxs reduced following cue. Will continue to progress as tolerated      []  See Progress Note/Recertification   Patient will continue to benefit from skilled PT services to modify and progress therapeutic interventions, address functional mobility deficits, analyze and cue movement patterns, and analyze and modify body mechanics/ergonomics to attain remaining goals. Progress toward goals / Updated goals:    New Goals to be achieved in __8__  weeks:  Pt to demonstrate full and appropriate running mechanics to return to PLOF. Progressing 10/12/22  2. Pt to report decreased max pain levels less than 2/10 for improvement in QoL. 3. Pt to report return to work with no adverse reactions.  Initiated return to work exercises 10/05          PLAN  [x]  Upgrade activities as tolerated yes Continue plan of care   []  Discharge due to :    []  Other:      Therapist: Layne Calabrese    Date: 10/14/2022 Time: 356 PM     Future Appointments   Date Time Provider Diana Keene   10/18/2022 10:50 AM Zoe Pinedo PT [facility]   10/20/2022  9:40 AM Meliza Orellana [facility]   10/25/2022  2:30 PM Zoe Pinedo PT [facility]   10/27/2022  2:30 PM Zoe Pinedo PT [facility]

## 2022-10-18 ENCOUNTER — HOSPITAL ENCOUNTER (OUTPATIENT)
Dept: PHYSICAL THERAPY | Age: 32
Discharge: HOME | End: 2022-10-18
Payer: MEDICAID

## 2022-10-18 PROCEDURE — 97530 THERAPEUTIC ACTIVITIES: CPT

## 2022-10-18 PROCEDURE — 97112 NEUROMUSCULAR REEDUCATION: CPT

## 2022-10-18 PROCEDURE — 97110 THERAPEUTIC EXERCISES: CPT

## 2022-10-18 PROCEDURE — 97535 SELF CARE MNGMENT TRAINING: CPT

## 2022-10-18 NOTE — PROGRESS NOTES
PHYSICAL THERAPY - DAILY TREATMENT NOTE    Patient Name: Bridger De Leon        Date: 10/18/2022  : 1990   yes Patient  Verified  Visit #:   32  of   39  Insurance: Payor: [address] / Plan: [address] [facility] / Product Type: Managed Care Medicaid /      In time: 10:51 Out time: 11:32   Total Treatment Time: 43     TREATMENT AREA =  Left knee pain [M25.562]    SUBJECTIVE  Pain Level (on 0 to 10 scale): 0/ 10    Medication Changes/New allergies or changes in medical history, any new surgeries or procedures?    no  If yes, update Summary List   Subjective Functional Status/Changes:  []  No changes reported     Reports knee is doing good today. OBJECTIVE  10 min Therapeutic Exercise:  [x]  See flow sheet   Rationale:      increase ROM, increase strength and improve coordination to improve the patients ability to recruit quad     15 min Therapeutic Activity: [x]  See flow sheet   Rationale:    increase ROM and improve coordination to improve the patients ability to safely negotiate curbs/stairs with AD within the community. 10 min Neuromuscular Re-ed: [x]  See flow sheet   Rationale:    improve coordination, improve balance, and increase proprioception to improve the patients ability to improve stance phase. 8 min Self Care: Review of insurance and visits allowed; discussion POC through end of Nov   Rationale:     Pt education  to improve the patients ability to progress PT. Billed With/As:   [x] TE   [] TA   [] Neuro   [x] Self Care Patient Education: [x] Review HEP    [] Progressed/Changed HEP based on:   [] positioning   [] body mechanics   [] transfers   [] heat/ice application    [] other:      Other Objective/Functional Measures:    *16 weeks post op  *warm up on elliptical followed by exercises (see flowsheet for loads, drills, & volumes). Performed multiple exercises to introduce light plyometrics/return to running.    *alternated between 6 inch lateral tap downs and resisted hamstring curls to reduce strain        Post Treatment Pain Level (on 0 to 10) scale:   0/ 10     ASSESSMENT  Assessment/Changes in Function:     Encouraged quicker reaction times/change of direction this date for recruitment of type 2 fibers. Preparing well for return to run. Will continue to progress with this POC. []  See Progress Note/Recertification   Patient will continue to benefit from skilled PT services to modify and progress therapeutic interventions, address functional mobility deficits, analyze and cue movement patterns, and analyze and modify body mechanics/ergonomics to attain remaining goals. Progress toward goals / Updated goals:    New Goals to be achieved in __8__  weeks:  Pt to demonstrate full and appropriate running mechanics to return to OF. Progressing 10/12/22  2. Pt to report decreased max pain levels less than 2/10 for improvement in QoL. Progressing well 10/18/22  3. Pt to report return to work with no adverse reactions.  Initiated return to work exercises 10/05          PLAN  [x]  Upgrade activities as tolerated yes Continue plan of care   []  Discharge due to :    []  Other:      Therapist: Erin Osborn PT, DPT    Date: 10/18/2022 Time: 356 PM     Future Appointments   Date Time Provider Diana Keene   10/18/2022 10:50 AM John Barker, PT [facility]   10/20/2022  9:40 AM Mario Alberto Boo [facility]   10/25/2022  2:30 PM John Barker, PT [facility]   10/27/2022  2:30 PM John Barker, PT [facility]   11/1/2022  2:35 PM John Barker PT [facility]   11/8/2022  3:10 PM Irene Meter [facility]   11/15/2022  2:55 PM Irene Meter [facility]   11/22/2022  3:15 PM John Barker, PT [facility]   11/29/2022  2:55 PM Irene Meter [facility]

## 2022-10-20 ENCOUNTER — HOSPITAL ENCOUNTER (OUTPATIENT)
Dept: PHYSICAL THERAPY | Age: 32
Discharge: HOME | End: 2022-10-20
Payer: MEDICAID

## 2022-10-20 PROCEDURE — 97112 NEUROMUSCULAR REEDUCATION: CPT

## 2022-10-20 PROCEDURE — 97535 SELF CARE MNGMENT TRAINING: CPT

## 2022-10-20 PROCEDURE — 97530 THERAPEUTIC ACTIVITIES: CPT

## 2022-10-20 PROCEDURE — 97110 THERAPEUTIC EXERCISES: CPT

## 2022-10-20 NOTE — PROGRESS NOTES
PHYSICAL THERAPY - DAILY TREATMENT NOTE    Patient Name: Russ Suarez        Date: 10/20/2022  : 1990   yes Patient  Verified  Visit #:   28  of   36  Insurance: [facility]or: [facility] / Plan: 231 [facility] / Product Type: Managed Care Medicaid /      In time: 940 Out time: 8   Total Treatment Time: 36     TREATMENT AREA =  Left knee pain [M25.562]    SUBJECTIVE  Pain Level (on 0 to 10 scale): 0/ 10    Medication Changes/New allergies or changes in medical history, any new surgeries or procedures?    no  If yes, update Summary List   Subjective Functional Status/Changes:  []  No changes reported     Patient reports being sore after last visit, but denied pain or red flags. States that she went for a walk around [facility] and felt good. OBJECTIVE  8 min Therapeutic Exercise:  [x]  See flow sheet   Rationale:      increase ROM, increase strength and improve coordination to improve the patients ability to recruit quad     10 min Therapeutic Activity: [x]  See flow sheet   Rationale:    increase ROM and improve coordination to improve the patients ability to safely negotiate curbs/stairs with AD within the community. 10 min Neuromuscular Re-ed: [x]  See flow sheet   Rationale:    improve coordination, improve balance, and increase proprioception to improve the patients ability to improve stance phase. 8 min Self Care: Review of insurance and visits allowed; discussion POC through end of Nov   Rationale:     Pt education  to improve the patients ability to progress PT. Billed With/As:   [x] TE   [] TA   [] Neuro   [x] Self Care Patient Education: [x] Review HEP    [] Progressed/Changed HEP based on:   [] positioning   [] body mechanics   [] transfers   [] heat/ice application    [] other:      Other Objective/Functional Measures:    *16 weeks post op. 17 weeks post op tomorrow.    *warm up on elliptical followed by exercises (see flowsheet for loads, drills, & volumes). Focused PT session on light plyometric and return to run/jog activities  *alternated between RFESS and resisted hamstring curls to reduce strain        Post Treatment Pain Level (on 0 to 10) scale:   0/ 10     ASSESSMENT  Assessment/Changes in Function:     Demo fair speed with ladder drills, challenged with coordination aspect to the activity. Denied sharp pain or red flags during PT session. []  See Progress Note/Recertification   Patient will continue to benefit from skilled PT services to modify and progress therapeutic interventions, address functional mobility deficits, analyze and cue movement patterns, and analyze and modify body mechanics/ergonomics to attain remaining goals. Progress toward goals / Updated goals:    New Goals to be achieved in __8__  weeks:  Pt to demonstrate full and appropriate running mechanics to return to OF. Progressing 10/20/22 indicated by current POC that include light plyometrics  2. Pt to report decreased max pain levels less than 2/10 for improvement in QoL. Progressing 10/20 indicated by pre vs post tx pain levels  3. Pt to report return to work with no adverse reactions.  scheduled to RTW on 10/24           PLAN  [x]  Upgrade activities as tolerated yes Continue plan of care   []  Discharge due to :    []  Other:      Therapist: Sanjay Batista    Date: 10/20/2022 Time: 1240 PM     Future Appointments   Date Time Provider Diana Keene   10/25/2022  2:30 PM Yulia Esquivel, PT [facility]   10/27/2022  2:30 PM Yulia Esquivel PT [facility]   11/1/2022  2:35 PM [facility]ul[facility]ivel [facility]   11/8/2022  3:10 PM Arvmarcia Sicks [facility]   11/15/2022  2:55 PM Baljinder Carr [facility]   11/22/2022  3:15 PM Yulia Esquivel, PT [facility]   11/29/2022  2:55 PM Baljinder Carr [facility]

## 2022-10-25 ENCOUNTER — APPOINTMENT (OUTPATIENT)
Dept: PHYSICAL THERAPY | Age: 32
End: 2022-10-25
Payer: MEDICAID

## 2022-10-25 ENCOUNTER — TELEPHONE (OUTPATIENT)
Dept: PHYSICAL THERAPY | Age: 32
End: 2022-10-25

## 2022-10-27 ENCOUNTER — HOSPITAL ENCOUNTER (OUTPATIENT)
Dept: PHYSICAL THERAPY | Age: 32
Discharge: HOME | End: 2022-10-27
Payer: MEDICAID

## 2022-10-27 PROCEDURE — 97112 NEUROMUSCULAR REEDUCATION: CPT

## 2022-10-27 PROCEDURE — 97530 THERAPEUTIC ACTIVITIES: CPT

## 2022-10-27 PROCEDURE — 97110 THERAPEUTIC EXERCISES: CPT

## 2022-10-27 NOTE — PROGRESS NOTES
PHYSICAL THERAPY - DAILY TREATMENT NOTE    Patient Name: Shwetha Weaver        Date: 10/27/2022  : 1990   yes Patient  Verified  Visit #:   35  of   44  Insurance: Payor: [address] [facility] / Plan: [facility] / Product Type: Managed Care Medicaid /      In time: 2:30 Out time: 3:17   Total Treatment Time: 47     TREATMENT AREA =  Left knee pain [M25.562]    SUBJECTIVE  Pain Level (on 0 to 10 scale): 4 10    Medication Changes/New allergies or changes in medical history, any new surgeries or procedures?    no  If yes, update Summary List   Subjective Functional Status/Changes:  []  No changes reported     Notes she is back to work. Starts hurting around 11am and she rests it at lunch, but she feels like its even worse after lunch. Notes some of her co-workers say she is limping.        OBJECTIVE  Modalities Rationale:     decrease inflammation and decrease pain to improve patient's ability to tolerate ADLS   min [] Estim, type/location:                                      []  att     []  unatt     []  w/US     []  w/ice    []  w/heat    min []  Mechanical Traction: type/lbs                   []  pro   []  sup   []  int   []  cont    []  before manual    []  after manual    min []  Ultrasound, settings/location:      min []  Iontophoresis w/ dexamethasone, location:                                               []  take home patch       []  in clinic   10 min [x]  Ice     []  Heat    location/position: L knee    min []  Vasopneumatic Device, press/temp:    If using vaso (only need to measure limb vaso being performed on)      pre-treatment girth :       post-treatment girth :       measured at (landmark location) :      min []  Other:    [x] Skin assessment post-treatment (if applicable):    [x]  intact    []  redness- no adverse reaction                  []redness - adverse reaction:        10 min Therapeutic Exercise:  [x]  See flow sheet   Rationale:      increase ROM, increase strength and improve coordination to improve the patients ability to recruit quad     17 min Therapeutic Activity: [x]  See flow sheet   Rationale:    increase ROM and improve coordination to improve the patients ability to safely negotiate curbs/stairs with AD within the community. 10 min Neuromuscular Re-ed: [x]  See flow sheet   Rationale:    improve coordination, improve balance, and increase proprioception to improve the patients ability to improve stance phase. Billed With/As:   [x] TE   [] TA   [] Neuro   [] Self Care Patient Education: [x] Review HEP    [] Progressed/Changed HEP based on:   [] positioning   [] body mechanics   [] transfers   [] heat/ice application    [] other:      Other Objective/Functional Measures:    *18 weeks post op  *warm up on elliptical followed by exercises (see flowsheet for loads, drills, & volumes). *initiated more running drills and agility drills       Post Treatment Pain Level (on 0 to 10) scale:   0/ 10     ASSESSMENT  Assessment/Changes in Function:     Initiated session with elliptical f/b ladder drills. Due to increase in swelling and pain from work, mat exercises were performed f/b light jog to initiate running at 18weeks. Pt reported she felt no pain, so we performed more running and agility drills with cones to involve fwd, bwd, lateral running, shuttle drills and figure 8 drills. Good tolerance to all this. Ended with CP due to continued swelling. []  See Progress Note/Recertification   Patient will continue to benefit from skilled PT services to modify and progress therapeutic interventions, address functional mobility deficits, analyze and cue movement patterns, and analyze and modify body mechanics/ergonomics to attain remaining goals. Progress toward goals / Updated goals:    New Goals to be achieved in __8__  weeks:  Pt to demonstrate full and appropriate running mechanics to return to PLOF.  Progressing 10/20/22 indicated by current POC that include light plyometrics  2. Pt to report decreased max pain levels less than 2/10 for improvement in QoL. Progressing 10/20 indicated by pre vs post tx pain levels  3. Pt to report return to work with no adverse reactions.  scheduled to RTW on 10/24           PLAN  [x]  Upgrade activities as tolerated yes Continue plan of care   []  Discharge due to :    []  Other:      Therapist: Олег Velasquez PT, DPT    Date: 10/27/2022 Time: 200 PM     Future Appointments   Date Time Provider Diana Keene   10/27/2022  2:30 PM Liliam Puri, PT [facility]   11/1/2022  2:35 PM Liliam Puri, PT [facility]   11/8/2022  3:10 PM Veronica Bejarano [facility]   11/15/2022  2:55 PM Jennifer [facility]   11/22/2022  3:15 PM Liliam Puri, PT [facility]   11/29/2022  2:55 PM Jennifer [facility]

## 2022-11-01 ENCOUNTER — HOSPITAL ENCOUNTER (OUTPATIENT)
Dept: PHYSICAL THERAPY | Age: 32
Discharge: HOME | End: 2022-11-01
Payer: MEDICAID

## 2022-11-01 PROCEDURE — 97530 THERAPEUTIC ACTIVITIES: CPT

## 2022-11-01 PROCEDURE — 97112 NEUROMUSCULAR REEDUCATION: CPT

## 2022-11-01 PROCEDURE — 97116 GAIT TRAINING THERAPY: CPT

## 2022-11-01 PROCEDURE — 97535 SELF CARE MNGMENT TRAINING: CPT

## 2022-11-01 PROCEDURE — 97110 THERAPEUTIC EXERCISES: CPT

## 2022-11-01 NOTE — PROGRESS NOTES
PHYSICAL THERAPY - DAILY TREATMENT NOTE    Patient Name: Benton Spears        Date: 2022  : 1990   yes Patient  Verified  Visit #:   29  of   44  Insurance: Payor: Logan Leyvaeker / Plan: 231 [facility] / Product Type: Managed Care Medicaid /      In time: 2:34 Out time: 3:24   Total Treatment Time: 50     TREATMENT AREA =  Left knee pain [M25.562]    SUBJECTIVE  Pain Level (on 0 to 10 scale): 4/ 10    Medication Changes/New allergies or changes in medical history, any new surgeries or procedures?    no  If yes, update Summary List   Subjective Functional Status/Changes:  []  No changes reported     Notes she is back to work. Starts hurting around 11am and she rests it at lunch, but she feels like its even worse after lunch. Notes some of her co-workers say she is limping.        OBJECTIVE  Modalities Rationale:     decrease inflammation and decrease pain to improve patient's ability to tolerate ADLS   min [] Estim, type/location:                                      []  att     []  unatt     []  w/US     []  w/ice    []  w/heat    min []  Mechanical Traction: type/lbs                   []  pro   []  sup   []  int   []  cont    []  before manual    []  after manual    min []  Ultrasound, settings/location:      min []  Iontophoresis w/ dexamethasone, location:                                               []  take home patch       []  in clinic   [place] min [x]  Ice     []  Heat    location/position: L knee    min []  Vasopneumatic Device, press/temp:    If using vaso (only need to measure limb vaso being performed on)      pre-treatment girth :       post-treatment girth :       measured at (landmark location) :      min []  Other:    [x] Skin assessment post-treatment (if applicable):    [x]  intact    []  redness- no adverse reaction                  []redness - adverse reaction:        8 min Therapeutic Exercise:  [x]  See flow sheet   Rationale:      increase ROM, increase strength and improve coordination to improve the patients ability to recruit quad     14 min Therapeutic Activity: [x]  See flow sheet   Rationale:    increase ROM and improve coordination to improve the patients ability to safely negotiate curbs/stairs with AD within the community. 10 min Neuromuscular Re-ed: [x]  See flow sheet   Rationale:    improve coordination, improve balance, and increase proprioception to improve the patients ability to improve stance phase. 10 min Gait Training:  return to jog/run on TM; ladder agility drills   Rationale: To improve ambulation safety and efficiency in order to improve patient's ability to safely ambulate at home for self care. 8 min Self Care: Return to running discussion about progressing of protocol and running indoors/outdoors and up stairs   Rationale:     pt education  to improve the patients ability to progress PT safely. Billed With/As:   [x] TE   [] TA   [] Neuro   [] Self Care Patient Education: [x] Review HEP    [] Progressed/Changed HEP based on:   [] positioning   [] body mechanics   [] transfers   [] heat/ice application    [] other:      Other Objective/Functional Measures:    *18 weeks post op  *warm up on elliptical followed by exercises (see flowsheet for loads, drills, & volumes). *initiated walk/jog-run on TM (3mph/5mph [id number])       Post Treatment Pain Level (on 0 to 10) scale:   0/ 10     ASSESSMENT  Assessment/Changes in Function:     See PN     [x]  See Progress Note/Recertification   Patient will continue to benefit from skilled PT services to modify and progress therapeutic interventions, address functional mobility deficits, analyze and cue movement patterns, and analyze and modify body mechanics/ergonomics to attain remaining goals. Progress toward goals / Updated goals:    New Goals to be achieved in __8__  weeks:  Pt to demonstrate full and appropriate running mechanics to return to PLOF.  Progressing 10/20/22 indicated by current POC that include light plyometrics  2. Pt to report decreased max pain levels less than 2/10 for improvement in QoL. Progressing 10/20 indicated by pre vs post tx pain levels  3. Pt to report return to work with no adverse reactions.  scheduled to RTW on 10/24         PLAN  [x]  Upgrade activities as tolerated yes Continue plan of care   []  Discharge due to :    []  Other:      Therapist: Joslyn Carmichael PT, DPT    Date: 11/1/2022 Time: 200 PM     Future Appointments   Date Time Provider Diana Keene   11/1/2022  2:35 PM Kathy Esquivel PT  [facility]   11/8/2022  3:10 PM Rachel Sensor [facility]   11/15/2022  2:55 PM Cayey Band  [facility]   11/22/2022  3:15 PM Kathy Esquivel PT  [facility]   11/29/2022  2:55 PM Shaka Band  [facility]

## 2022-11-01 NOTE — THERAPY RECERTIFICATION
[facility] PHYSICAL THERAPY  [address] [place] [address] Zakia Linares [facility] [address],[facility], [facility] - Phone: [phone]  Fax: [phone]  PROGRESS NOTE  Patient Name: Charles Sears : 1990   Treatment/Medical Diagnosis: Left knee pain [M25.562]   Referral Source: Collin Cardenas,*     Date of Initial Visit: 22 Attended Visits: 34 Missed Visits: 1     SUMMARY OF TREATMENT  Patient has attended 34 PT sessions, including an initial evaluation for L knee pain (s/p L ACLR hamstring autograft 22). PT interventions have included manual therapy, therapeutic exercises, patient education, and HEP to improve L knee ROM strength, and stability. CP for pain control and decrease edema. CURRENT STATUS  Patient is currently 18 weeks post op. Patient has made steady progress with skilled PT. Notes max pain level at 5/10 after being at work all day but average pain level of 1/10. Reports no difficulties with basic ADLs, however she has now returned to work full time and is slowly adjusting to being on her feet for hours at a time. We have begun to introduce plyometric and return to run activities which pt is doing well will. She would continue to benefit form skilled PT to address strength deficits and continue with post op ACL protocol. Goal/Measure of Progress Goal Met? 1.  Pt to demonstrate full and appropriate running mechanics to return to PLOF. Status at last Eval: Goal established Current Status: Decreased weight acceptance on L, decreased push off progressing   2. Pt to report decreased max pain levels less than 2/10 for improvement in ADLs. Status at last Eval: 3/10 Current Status: 5/10 no   3. Pt to report return to work with no adverse reactions.    Status at last Eval: Goal established Current Status: Swelling and discomfort at the end of the work day in L knee progressing     New Goals to be achieved in __4__  weeks:  Pt to demonstrate full and appropriate running mechanics to return to PLOF. 2. Pt to report decreased max pain levels less than 2/10 for improvement in QoL. 3. Pt to demonstrate 3 meter SL hop without evidence of knee valgus to return to PLOF. RECOMMENDATIONS  Recommend 1-2x/week for 4 weeks to continue to improve L knee strength, and stability to return to PLOF and complete functional mobility activities. Thank you for this referral.   If you have any questions/comments please contact us directly at [phone]. Thank you for allowing us to assist in the care of your patient. Therapist Signature: Birdie Myers, PT, DPT Date: 11/1/2022     Time: 5:04 PM   NOTE TO PHYSICIAN:  PLEASE COMPLETE THE ORDERS BELOW AND FAX TO   [facility]: [phone]  If you are unable to process this request in 24 hours please contact our office: [phone]    ___ I have read the above report and request that my patient continue as recommended.   ___ I have read the above report and request that my patient continue therapy with the following changes/special instructions:_________________________________________________________   ___ I have read the above report and request that my patient be discharged from therapy.      Physician Signature:        Date:       Time:                                 Monica Barnes

## 2022-11-07 ENCOUNTER — TELEPHONE (OUTPATIENT)
Dept: PHYSICAL THERAPY | Age: 32
End: 2022-11-07

## 2022-11-08 ENCOUNTER — APPOINTMENT (OUTPATIENT)
Dept: PHYSICAL THERAPY | Age: 32
End: 2022-11-08
Payer: MEDICAID

## 2022-11-15 ENCOUNTER — HOSPITAL ENCOUNTER (OUTPATIENT)
Dept: PHYSICAL THERAPY | Age: 32
Discharge: HOME | End: 2022-11-15
Payer: MEDICAID

## 2022-11-15 PROCEDURE — 97110 THERAPEUTIC EXERCISES: CPT

## 2022-11-15 PROCEDURE — 97112 NEUROMUSCULAR REEDUCATION: CPT

## 2022-11-15 PROCEDURE — 97530 THERAPEUTIC ACTIVITIES: CPT

## 2022-11-15 PROCEDURE — 97535 SELF CARE MNGMENT TRAINING: CPT

## 2022-11-15 NOTE — PROGRESS NOTES
PHYSICAL THERAPY - DAILY TREATMENT NOTE    Patient Name: Dat Gutierrez        Date: 11/15/2022  : 1990   yes Patient  Verified  Visit #:   28  of   36  Insurance: Payor: Yogesh Garland / Plan: Ines Rivas / Product Type: Managed Care Medicaid /      In time: 255 Out time: 347   Total Treatment Time: 52     TREATMENT AREA =  Left knee pain [M25.562]    SUBJECTIVE  Pain Level (on 0 to 10 scale): 4/ 10    Medication Changes/New allergies or changes in medical history, any new surgeries or procedures?    no  If yes, update Summary List   Subjective Functional Status/Changes:  []  No changes reported     Pt reports noticing more swelling and pain since returning to work. Will notice the most pain and swelling at the end of the week (Thursday and Friday) due to work activities. Most pain with knee flexion. OBJECTIVE  10 min Therapeutic Exercise:  [x]  See flow sheet   Rationale:      increase ROM, increase strength and improve coordination to improve the patients ability to recruit quad     10 min Therapeutic Activity: [x]  See flow sheet   Rationale:    increase ROM and improve coordination to improve the patients ability to safely negotiate curbs/stairs with AD within the community. 22 min Neuromuscular Re-ed: [x]  See flow sheet   Rationale:    improve coordination, improve balance, and increase proprioception to improve the patients ability to improve stance phase. 10 min Self Care: Reviewed progressive HEP. Reviewed current diagnosis, prognosis, and POC. Educated pt on use of compression sleeve   Rationale:  to improve understanding of current diagnosis with realistic expectation of PT to improve compliance/adherence and satisfaction.     Billed With/As:   [x] TE   [] TA   [] Neuro   [x] Self Care Patient Education: [x] Review HEP    [] Progressed/Changed HEP based on:   [] positioning   [] body mechanics   [] transfers   [] heat/ice application    [] other:      Other Objective/Functional Measures:    *warm up on elliptical followed by warm up exercises (see flowsheet for loads, drills, & volumes). *TM walk/jo minute walk warm up     5 rounds 30 sec walk: 30 sec jog     2 minute walk cool down     Post Treatment Pain Level (on 0 to 10) scale:   0/ 10     ASSESSMENT  Assessment/Changes in Function:     Pt reports no pain following PT session, discussed using knee compression sleeve during the work day to assist with swelling and decrease pain. Pt acknowledged understanding. []  See Progress Note/Recertification   Patient will continue to benefit from skilled PT services to modify and progress therapeutic interventions, address functional mobility deficits, analyze and cue movement patterns, and analyze and modify body mechanics/ergonomics to attain remaining goals. Progress toward goals / Updated goals:    New Goals to be achieved in __4__  weeks:  Pt to demonstrate full and appropriate running mechanics to return to PLOF. 2. Pt to report decreased max pain levels less than 2/10 for improvement in QoL. 3. Pt to demonstrate 3 meter SL hop without evidence of knee valgus to return to PLOF.        PLAN  [x]  Upgrade activities as tolerated yes Continue plan of care   []  Discharge due to :    []  Other:      Therapist: Angeli Kraus    Date: 11/15/2022 Time: 26 PM     Future Appointments   Date Time Provider Diana Keene   11/15/2022  2:55 PM Juanita Mann [facility]   2022  3:15 PM Matthew Hernandez, PT [facility]   2022  2:55 PM Elda Cordovat [facility]

## 2022-11-22 ENCOUNTER — APPOINTMENT (OUTPATIENT)
Dept: PHYSICAL THERAPY | Age: 32
End: 2022-11-22
Payer: MEDICAID

## 2022-11-29 ENCOUNTER — HOSPITAL ENCOUNTER (OUTPATIENT)
Dept: PHYSICAL THERAPY | Age: 32
Discharge: HOME | End: 2022-11-29
Payer: MEDICAID

## 2022-11-29 PROCEDURE — 97535 SELF CARE MNGMENT TRAINING: CPT

## 2022-11-29 PROCEDURE — 97112 NEUROMUSCULAR REEDUCATION: CPT

## 2022-11-29 NOTE — THERAPY DISCHARGE
201 [facility] PHYSICAL THERAPY  [address] [place], [place] [address] [facility] [address],Daniela Avila, [facility] - Phone: [phone]  Fax: [phone]  [address] [facility],[address]  Patient Name: Bridger De Leon : 1990   Treatment/Medical Diagnosis: Left knee pain [M25.562]   Referral Source: Lydia dEge,*     Date of Initial Visit: 22 Attended Visits: 36 Missed Visits: 2     SUMMARY OF TREATMENT  Patient attended 36 PT sessions, including an initial evaluation for L knee pain (s/p L ACLR hamstring autograft 22). PT interventions have included manual therapy, therapeutic exercises, patient education, and HEP to improve L knee ROM strength, and stability. CP for pain control and decrease edema. CURRENT STATUS  Patient has made good gains with PT interventions for L knee pain. Patient will note occasional max pain (5/10) due to repetitive kneeling/bending of the knee at work. However pt is able to participate in recreational activities and return to gym work outs without red flags or sharp pain. Patient demonstrates good understanding of self management of symptoms with ice and stretches. Patient also demonstrates good compliance with progressive HEP to maximize gains made from PT. Patient is now discharged from physical therapy due to progressing towards/meeting all PT goals. Thank you for this referral.     Goal/Measure of Progress Goal Met? 1.  Pt to demonstrate full and appropriate running mechanics to return to PLOF. Status at Last Eval: Introduced to plyometrics and running Current Status: Able to participate in recreational activities (including running) yes   2. Pt to report decreased max pain levels less than 2/10 for improvement in ADLs. Status at Last Eval: 5/10 Current Status: 5/10 no   3. Pt to demonstrate 3 meter SL hop without evidence of knee valgus to return to PLOF.    Status at Last Eval: Goal established Current Status: Able yes RECOMMENDATIONS  Discontinue therapy. Progressing towards or have reached established goals. Thank you for this referral.     If you have any questions/comments please contact us directly at [phone]. Thank you for allowing us to assist in the care of your patient. Therapist Signature: BONY Gupta, PT, DPT Date: 12/13/2022     Time: 6:26 PM     NOTE TO PHYSICIAN:  Your patient's insurance requires this discharge note be signed and returned. PLEASE COMPLETE THE ORDERS BELOW AND RETURN TO:  [facility] PHYSICAL THERAPY    ___ I have read the above report and request that my patient be discharged from therapy.      Physician Signature:        Date:       Time:                                        Karen De Leon

## 2022-11-29 NOTE — PROGRESS NOTES
PHYSICAL THERAPY - DAILY TREATMENT NOTE    Patient Name: Suzan Jaffe        Date: 2022  : 1990   yes Patient  Verified  Visit #:   39  of   36  Insurance: Payor: [address] / Plan: [facility] / Product Type: Managed Care Medicaid /      In time: 255 Out time: 320   Total Treatment Time: 25     TREATMENT AREA =  Left knee pain [M25.562]    SUBJECTIVE  Pain Level (on 0 to 10 scale): 0 / 10    Medication Changes/New allergies or changes in medical history, any new surgeries or procedures?    no  If yes, update Summary List   Subjective Functional Status/Changes:  []  No changes reported       SEE DC       OBJECTIVE      10 min Neuromuscular Re-ed: [x]  See flow sheet   Rationale:    improve coordination, improve balance, and increase proprioception to improve the patients ability to improve stance phase. 15 min Self Care: POC/biomechanical reassessment. Reviewed long-term HEP and emphasized importance with HEP compliance in order to maximize gains made from PT and self manage symptoms. Rationale:  to improve understanding of self management of symptoms in prep for D/C to home. Billed With/As:   [] TE   [] TA   [] Neuro   [x] Self Care Patient Education: [x] Review HEP    [] Progressed/Changed HEP based on:   [] positioning   [] body mechanics   [] transfers   [] heat/ice application    [] other:      Other Objective/Functional Measures:    SEE DC     Post Treatment Pain Level (on 0 to 10) scale:  0/ 10     ASSESSMENT  Assessment/Changes in Function:     SEE DC     []  See Progress Note/Recertification   Patient will continue to benefit from skilled PT services to modify and progress therapeutic interventions, address functional mobility deficits, analyze and cue movement patterns, and analyze and modify body mechanics/ergonomics to attain remaining goals.    Progress toward goals / Updated goals:    SEE DC       PLAN  []  Upgrade activities as tolerated NO Continue plan of care   [x]  Discharge due to : Completed PT program.    []  Other:      Therapist: BONY Galeas    Date: 11/29/2022 Time: 533 PM     Future Appointments   Date Time Provider Diana Keene   11/29/2022  2:55 PM Eber Salinas

## 2022-12-22 ENCOUNTER — HOSPITAL ENCOUNTER (OUTPATIENT)
Dept: PHYSICAL THERAPY | Age: 32
Discharge: HOME | End: 2022-12-22
Payer: MEDICAID

## 2022-12-22 PROCEDURE — 97161 PT EVAL LOW COMPLEX 20 MIN: CPT

## 2022-12-22 NOTE — THERAPY EVALUATION
[address], [facility] [address],[facility], [address] [facility] - Phone: [phone]  Fax: [phone] OF [facility] / [address] [facility]  Patient Name: Eris Gao : 1990   Medical   Diagnosis: Pain in left knee [M25.562] Treatment Diagnosis: L knee pain   Onset Date: 22     Referral Source: Puja Ventura [place] of Pending sale to [facility]): 2022   Prior Hospitalization: See medical history Provider #: [id number]   Prior Level of Function: No pain or difficulty with ambulation, stairs, or jumping   Comorbidities: Asthma   Medications: Verified on Patient Summary List   The Plan of Care and following information is based on the information from the initial evaluation.   ===========================================================================================  Assessment / key information:  Eris Goa is a 27 y.o. female with Dx: L knee pain after ACLR on 22. She was previously treated here before and after ACLR, so I am very familiar with her case. Pt reports her knee occasionally gets swollen at the end of the week and has some pain. Notes she feels like she is just getting back into her normal routine with workouts and weekend exercises after being sick for a month. Her goals are to be able to do a workout without modifications, kneel on her knee, and be able to play basketball with her son 1v1. Pt rates pain as 5/10 max, 0/10 at best, 0/10 currently. Occupation:   Objective: FOTO score = 63 (an established functional score where 100 = no disability). Gait: WNL. Observation: incision healed with swelling. Knee ROM Flexion R 135 L 128, Extension R 0 L 0. Strength: Knee Flexion R 5/5 L 4/5, Extension R 5/5 L 4+/5.    Functional testing: Functional Squat knee angle 120 without UE support and equal weight shift; Kayla dye depth with pain along L anterior knee, L good dept and weight shift; SL hop test 8in difference (16% difference); Triple hop test 22inch difference (24% difference); SLS on foam R 15sec L 15sec. Current deficits include decreased strength on L compared to R leading to difficulty with advanced therex such as basketball and HIIT workouts.    Pt will benefit from a comprehensive POC/HEP to address impairments and restore function in order to return to prior level of function and prevent secondary impairments.  ===========================================================================================  Eval Complexity: History MEDIUM  Complexity : 1-2 comorbidities / personal factors will impact the outcome/ POC ;  Examination  HIGH Complexity : 4+ Standardized tests and measures addressing body structure, function, activity limitation and / or participation in recreation ; Presentation LOW Complexity : Stable, uncomplicated ;  Decision Making MEDIUM Complexity : FOTO score of 26-74; Overall Complexity LOW   Problem List: pain affecting function, decrease ROM, decrease strength, impaired gait/ balance, decrease ADL/ functional abilitiies, decrease activity tolerance, decrease flexibility/ joint mobility, and decrease transfer abilities   Treatment Plan may include any combination of the following: Therapeutic exercise, Neuromuscular reeducation, Manual therapy, Therapeutic activity, Self care/home management, Electric stim unattended , Vasopneumatic device, Aquatic therapy, Gait training, Ultrasound, Electric stim attended, Needle insertion w/o injection (1 or 2 muscles), and Needle insertion w/o injection (3+ muscles)  Patient / Family readiness to learn indicated by: asking questions, trying to perform skills, and interest  Persons(s) to be included in education: patient (P)  Barriers to Learning/Limitations: None  Measures taken, if barriers to learning:    Patient Goal (s): Stronger knee   Patient self reported health status: fair  Rehabilitation Potential: good  Short Term Goals: To be accomplished in  4  weeks: Independent with HEP to progress to meet goals. 2. Pt demonstrate fwd lunge 2x10 with good form to progress kneeling. Long Term Goals: To be accomplished in  8  weeks:  Improve FOTO score to 72/100 to show a significant functional change. 2. Pt to demonstrate triple hop test within 20% of R leg for improvement in higher level activities. 3. Pt to demonstrate SL hop test within 10% of R leg for improvement in higher level activities. Frequency / Duration:   Patient to be seen  1-2  times per week for 6  weeks:  Patient / Caregiver education and instruction: self care, activity modification, and exercises  Therapist Signature: Juancho Salinas PT, DPT Date: 50/02/1476   Certification Period: na Time: 9:54 AM   ===========================================================================================  I certify that the above Physical Therapy Services are being furnished while the patient is under my care. I agree with the treatment plan and certify that this therapy is necessary. Physician Signature:        Date:       Time:                                        Ambrocio Kelly,*  Please sign and return to [facility] [place] [facility] or you may fax the signed copy to [phone]. Thank you.

## 2022-12-22 NOTE — PROGRESS NOTES
PHYSICAL THERAPY - DAILY TREATMENT NOTE    Patient Name: Osman Cabrera        Date: 2022  : 1990   yes Patient  Verified  Visit #:     Insurance: Payor: Mark Juana / Plan: [address] [facility] / Product Type: Managed Care Medicaid /      In time: 3:55 Out time: 4:25   Total Treatment Time: 30     TREATMENT AREA =  Pain in left knee [M25.562]    SUBJECTIVE  Pain Level (on 0 to 10 scale):  0  / 10   Medication Changes/New allergies or changes in medical history, any new surgeries or procedures?    no  If yes, update Summary List   Subjective Functional Status/Changes:  []  No changes reported     See POC          OBJECTIVE  5 min Self Care: Pt education on HEP, POC, prognosis, and diagnosis. Rationale:    Improve pt understanding to improve the patients ability to progress therapy at home. Billed With/As:   [] TE   [] TA   [] Neuro   [x] Self Care Patient Education: [x] Review HEP    [] Progressed/Changed HEP based on:   [] positioning   [] body mechanics   [] transfers   [] heat/ice application    [] other:      Other Objective/Functional Measures:    See POC     Post Treatment Pain Level (on 0 to 10) scale:   0  / 10     ASSESSMENT  Assessment/Changes in Function:     See POC     []  See Progress Note/Recertification   Patient will continue to benefit from skilled PT services to modify and progress therapeutic interventions, address functional mobility deficits, analyze and cue movement patterns, and analyze and modify body mechanics/ergonomics to attain remaining goals.    Progress toward goals / Updated goals:    See POC     PLAN  [x]  Upgrade activities as tolerated yes Continue plan of care   []  Discharge due to :    []  Other:      Therapist: Abbie Anderson PT , DPT    Date: 2022 Time: 9:54 AM     Future Appointments   Date Time Provider Diana Keene   2022  3:50 PM Shaila Pabon PT [facility]

## 2023-01-03 ENCOUNTER — TELEPHONE (OUTPATIENT)
Dept: PHYSICAL THERAPY | Age: 33
End: 2023-01-03

## 2023-01-19 ENCOUNTER — TELEPHONE (OUTPATIENT)
Dept: PHYSICAL THERAPY | Age: 33
End: 2023-01-19

## 2023-01-19 ENCOUNTER — APPOINTMENT (OUTPATIENT)
Dept: PHYSICAL THERAPY | Age: 33
End: 2023-01-19
Payer: MEDICAID

## 2023-01-24 ENCOUNTER — HOSPITAL ENCOUNTER (OUTPATIENT)
Dept: PHYSICAL THERAPY | Age: 33
Discharge: HOME | End: 2023-01-24
Payer: MEDICAID

## 2023-01-24 PROCEDURE — 97110 THERAPEUTIC EXERCISES: CPT

## 2023-01-24 PROCEDURE — 97535 SELF CARE MNGMENT TRAINING: CPT

## 2023-01-24 PROCEDURE — 97530 THERAPEUTIC ACTIVITIES: CPT

## 2023-01-24 NOTE — PROGRESS NOTES
PHYSICAL THERAPY - DAILY TREATMENT NOTE    Patient Name: Petra Wesley        Date: 2023  : 1990   yes Patient  Verified  Visit #:   2   of   12  Insurance: Payor: Landen Marin / Plan: [employer] / Product Type: Managed Care Medicaid /      In time: 4:03 Out time: 4:36   Total Treatment Time: 33     TREATMENT AREA =  Pain in left knee [M25.562]    SUBJECTIVE  Pain Level (on 0 to 10 scale):  2  / 10   Medication Changes/New allergies or changes in medical history, any new surgeries or procedures?    no  If yes, update Summary List   Subjective Functional Status/Changes:  []  No changes reported     It's just been hectic. OBJECTIVE  10 min Therapeutic Activity: [x]  See flow sheet   Rationale:    increase strength and improve coordination to improve the patients ability to perform transfers and ADLs. 15 min Therapeutic Exercise:  [x]  See flow sheet   Rationale:      increase strength and improve coordination to improve the patients ability to improve quad strength. 8 min Self Care: Discussion of POC and potential bakers cyst removal.   Rationale:    Improve pt understanding to improve the patients ability to progress therapy at home. Billed With/As:   [] TE   [] TA   [] Neuro   [x] Self Care Patient Education: [x] Review HEP    [] Progressed/Changed HEP based on:   [] positioning   [] body mechanics   [] transfers   [] heat/ice application    [] other:      Other Objective/Functional Measures:    See flow sheet for exercises performed. Post Treatment Pain Level (on 0 to 10) scale:   0  / 10     ASSESSMENT  Assessment/Changes in Function:     See PN     []  See Progress Note/Recertification   Patient will continue to benefit from skilled PT services to modify and progress therapeutic interventions, address functional mobility deficits, analyze and cue movement patterns, and analyze and modify body mechanics/ergonomics to attain remaining goals.    Progress toward goals / Updated goals:    See PN     PLAN  [x]  Upgrade activities as tolerated yes Continue plan of care   []  Discharge due to :    []  Other:      Therapist: Ady Chavarria, PT , DPT    Date: 1/24/2023 Time: 9:54 AM     Future Appointments   Date Time Provider Diana Keene   1/24/2023  4:00 PM TRAY Mosqueda [facility]   2/7/2023  4:30 PM Janean Leventhal

## 2023-01-24 NOTE — THERAPY RECERTIFICATION
[address] [facility] PHYSICAL THERAPY  [address] [place] [address] Clarissa [facility] [address] 201,[facility], 70 [facility] - Phone: [phone]  Fax: [phone]  PROGRESS NOTE  Patient Name: Maira Richards : 1990   Treatment/Medical Diagnosis: Pain in left knee [M25.562]   Referral Source: Audelia Traylor,*     Date of Initial Visit: 22 Attended Visits: 1 Missed Visits: 1     SUMMARY OF TREATMENT  Pt was seen for IE and 1 f/u visits with treatment consisting of therapeutic exercises, therapeutic activities, and self care techniques to improve L knee strength and stability along with instruction of HEP. CURRENT STATUS  Pt has only attended 1 visit since IE on 22 due to insurance and missed one visit due to being sick. She has been completing her I HEP at home daily and goes to [facility] to walk to do stairs when it is warm enough. She reports max pain levels at 5/10 and average pain level of 2/10. Pt notes at the end of the week, her knee is more swollen and painful but she is able to complete all activities. Also notes the activities she was once struggling with are getting easier, however she is still annoyed with the pain and swelling. Pt notes they are completing their HEP at least once a day. Pt will continue to benefit from skilled PT to progress exercises and improve upon L knee stability. Goal/Measure of Progress Goal Met? 1. Independent with HEP to progress to meet goals. Status at last Eval:  Current Status: yes yes   2. Pt demonstrate fwd lunge 2x10 with good form to progress kneeling. Status at last Eval:  Current Status: 2x10 yes   3. Improve FOTO score to 72/100 to show a significant functional change. Status at last Eval: 61 Current Status: NT due to 1 follow up visit n/a     New Goals to be achieved in __4__  weeks:  1. Improve FOTO score to 72/100 to show a significant functional change.   2. Pt to demonstrate triple hop test within 20% of R leg for improvement in higher level activities. 3. Pt to demonstrate SL hop test within 10% of R leg for improvement in higher level activities. RECOMMENDATIONS  Continue with therapy 1x/week for 4 weeks to further improve upon L knee strength and functional activities. Please advise. Thank you. If you have any questions/comments please contact us directly at [phone]. Thank you for allowing us to assist in the care of your patient. Therapist Signature: Yarely Edmonds PT, DPT Date: 1/24/2023     Time: 10:22 AM   NOTE TO PHYSICIAN:  PLEASE COMPLETE THE ORDERS BELOW AND FAX TO   [facility] Physical Therapy: [phone]  If you are unable to process this request in 24 hours please contact our office: [phone]    ___ I have read the above report and request that my patient continue as recommended.   ___ I have read the above report and request that my patient continue therapy with the following changes/special instructions:_________________________________________________________   ___ I have read the above report and request that my patient be discharged from therapy.      Physician Signature:        Date:       Time:                                 Evelyn Almanza

## 2023-02-03 ENCOUNTER — APPOINTMENT (OUTPATIENT)
Dept: PHYSICAL THERAPY | Age: 33
End: 2023-02-03

## 2023-02-06 ENCOUNTER — TELEPHONE (OUTPATIENT)
Dept: PHYSICAL THERAPY | Age: 33
End: 2023-02-06

## 2023-02-07 ENCOUNTER — APPOINTMENT (OUTPATIENT)
Dept: PHYSICAL THERAPY | Age: 33
End: 2023-02-07

## 2023-05-12 NOTE — PROGRESS NOTES
[address] [facility] Progress Note Date: 2019 Name: Brittani Fierro MRN: [id number] : 1990 Injectafer Infusion 2 of 2 Ms. Case to [facility], [facility], at 1500. Pt was assessed and education was provided. Printed CareNotes given and signed. Ms. Case's vitals were reviewed and patient was observed for 5 minutes prior to treatment. Visit Vitals BP (!) 140/99 (BP 1 Location: Right arm, BP Patient Position: Sitting) Pulse 77 Temp 97.3 °F (36.3 °C) SpO2 100% 22 g PIV placed in the right AC x1 attempt. PIV flushed easily and had brisk blood return. Injectafer 750mg infused over 20 minutes in 250mL NS. 
 
Ms. Madai Hernandez tolerated the infusion, and had no complaints. VS remained stable. Patient Vitals for the past 12 hrs: 
 Temp Pulse BP SpO2  
19 1545 97.3 °F (36.3 °C) 77 (!) 140/99   
19 1459 98.2 °F (36.8 °C) 79 126/87 100 % PIV flushed with NS 10 ml and capped. Patient observed 30 minutes post infusion. PIV removed. No bleeding or hematoma noted at site. Gauze and coban applied. Patient armband removed and shredded. Ms. Madai Hernandez was discharged from [place] [address] in stable condition at 1545. She is to return physician for follow up. Ezra Forbes RN 
2019 
[id number]